# Patient Record
Sex: FEMALE | Race: WHITE | NOT HISPANIC OR LATINO | Employment: UNEMPLOYED | URBAN - NONMETROPOLITAN AREA
[De-identification: names, ages, dates, MRNs, and addresses within clinical notes are randomized per-mention and may not be internally consistent; named-entity substitution may affect disease eponyms.]

---

## 2017-02-22 PROCEDURE — 80048 BASIC METABOLIC PNL TOTAL CA: CPT | Performed by: NURSE PRACTITIONER

## 2017-04-06 ENCOUNTER — APPOINTMENT (OUTPATIENT)
Dept: PREADMISSION TESTING | Facility: HOSPITAL | Age: 27
End: 2017-04-06

## 2017-06-26 ENCOUNTER — HOSPITAL ENCOUNTER (EMERGENCY)
Age: 27
Discharge: HOME | End: 2017-06-27
Payer: MEDICAID

## 2017-06-26 DIAGNOSIS — M25.562 ACUTE PAIN OF LEFT KNEE: ICD-10-CM

## 2017-06-26 DIAGNOSIS — M79.672 LEFT FOOT PAIN: Primary | ICD-10-CM

## 2017-06-26 PROCEDURE — 99283 EMERGENCY DEPT VISIT LOW MDM: CPT

## 2017-06-26 RX ORDER — DEXTROAMPHETAMINE SACCHARATE, AMPHETAMINE ASPARTATE, DEXTROAMPHETAMINE SULFATE AND AMPHETAMINE SULFATE 5; 5; 5; 5 MG/1; MG/1; MG/1; MG/1
40 TABLET ORAL DAILY
COMMUNITY
End: 2018-12-19

## 2017-06-26 ASSESSMENT — PAIN DESCRIPTION - PAIN TYPE: TYPE: ACUTE PAIN

## 2017-06-26 ASSESSMENT — PAIN DESCRIPTION - DESCRIPTORS: DESCRIPTORS: STABBING;BURNING

## 2017-06-26 ASSESSMENT — PAIN DESCRIPTION - LOCATION: LOCATION: KNEE;LEG;ANKLE

## 2017-06-26 ASSESSMENT — PAIN DESCRIPTION - ORIENTATION: ORIENTATION: LEFT

## 2017-06-26 ASSESSMENT — PAIN DESCRIPTION - FREQUENCY: FREQUENCY: CONTINUOUS

## 2017-06-27 ENCOUNTER — APPOINTMENT (OUTPATIENT)
Dept: GENERAL RADIOLOGY | Age: 27
End: 2017-06-27
Payer: MEDICAID

## 2017-06-27 VITALS
WEIGHT: 110 LBS | OXYGEN SATURATION: 99 % | TEMPERATURE: 98.1 F | DIASTOLIC BLOOD PRESSURE: 90 MMHG | BODY MASS INDEX: 18.33 KG/M2 | SYSTOLIC BLOOD PRESSURE: 135 MMHG | RESPIRATION RATE: 18 BRPM | HEIGHT: 65 IN | HEART RATE: 100 BPM

## 2017-06-27 PROCEDURE — 73630 X-RAY EXAM OF FOOT: CPT

## 2017-06-27 PROCEDURE — 6360000002 HC RX W HCPCS: Performed by: NURSE PRACTITIONER

## 2017-06-27 PROCEDURE — 99283 EMERGENCY DEPT VISIT LOW MDM: CPT | Performed by: NURSE PRACTITIONER

## 2017-06-27 PROCEDURE — 73610 X-RAY EXAM OF ANKLE: CPT

## 2017-06-27 PROCEDURE — 96372 THER/PROPH/DIAG INJ SC/IM: CPT

## 2017-06-27 RX ORDER — OXYCODONE HYDROCHLORIDE AND ACETAMINOPHEN 5; 325 MG/1; MG/1
1 TABLET ORAL EVERY 4 HOURS PRN
Qty: 15 TABLET | Refills: 0 | Status: SHIPPED | OUTPATIENT
Start: 2017-06-27 | End: 2017-07-04

## 2017-06-27 RX ORDER — HYDROCODONE BITARTRATE AND ACETAMINOPHEN 5; 325 MG/1; MG/1
1 TABLET ORAL EVERY 6 HOURS PRN
Qty: 15 TABLET | Refills: 0 | Status: SHIPPED | OUTPATIENT
Start: 2017-06-27 | End: 2017-06-27

## 2017-06-27 RX ORDER — KETOROLAC TROMETHAMINE 30 MG/ML
30 INJECTION, SOLUTION INTRAMUSCULAR; INTRAVENOUS ONCE
Status: COMPLETED | OUTPATIENT
Start: 2017-06-27 | End: 2017-06-27

## 2017-06-27 RX ORDER — IBUPROFEN 200 MG
400 TABLET ORAL EVERY 8 HOURS PRN
Qty: 30 TABLET | Refills: 0 | Status: SHIPPED | OUTPATIENT
Start: 2017-06-27 | End: 2018-10-02 | Stop reason: ALTCHOICE

## 2017-06-27 RX ADMIN — KETOROLAC TROMETHAMINE 30 MG: 30 INJECTION, SOLUTION INTRAMUSCULAR at 00:07

## 2017-06-27 ASSESSMENT — PAIN SCALES - GENERAL
PAINLEVEL_OUTOF10: 7
PAINLEVEL_OUTOF10: 9

## 2017-08-24 ENCOUNTER — HOSPITAL ENCOUNTER (EMERGENCY)
Age: 27
Discharge: HOME | End: 2017-08-24
Payer: MEDICAID

## 2017-08-24 VITALS
SYSTOLIC BLOOD PRESSURE: 136 MMHG | TEMPERATURE: 97.8 F | DIASTOLIC BLOOD PRESSURE: 98 MMHG | HEART RATE: 113 BPM | RESPIRATION RATE: 18 BRPM | OXYGEN SATURATION: 99 %

## 2017-08-24 DIAGNOSIS — K02.9 DENTAL CARIES: Primary | ICD-10-CM

## 2017-08-24 PROCEDURE — 99282 EMERGENCY DEPT VISIT SF MDM: CPT | Performed by: NURSE PRACTITIONER

## 2017-08-24 PROCEDURE — 99282 EMERGENCY DEPT VISIT SF MDM: CPT

## 2017-08-24 RX ORDER — OXYCODONE HYDROCHLORIDE AND ACETAMINOPHEN 5; 325 MG/1; MG/1
1 TABLET ORAL EVERY 4 HOURS PRN
Qty: 15 TABLET | Refills: 0 | Status: SHIPPED | OUTPATIENT
Start: 2017-08-24 | End: 2017-08-31

## 2017-08-24 RX ORDER — AMOXICILLIN 500 MG/1
500 CAPSULE ORAL 3 TIMES DAILY
Qty: 30 CAPSULE | Refills: 0 | Status: SHIPPED | OUTPATIENT
Start: 2017-08-24 | End: 2017-09-03

## 2017-08-24 ASSESSMENT — PAIN SCALES - GENERAL: PAINLEVEL_OUTOF10: 6

## 2017-08-24 ASSESSMENT — ENCOUNTER SYMPTOMS: VOMITING: 0

## 2017-10-09 ENCOUNTER — OFFICE VISIT (OUTPATIENT)
Dept: URGENT CARE | Age: 27
End: 2017-10-09
Payer: MEDICAID

## 2017-10-09 VITALS
SYSTOLIC BLOOD PRESSURE: 120 MMHG | WEIGHT: 109 LBS | BODY MASS INDEX: 18.16 KG/M2 | TEMPERATURE: 98.6 F | HEIGHT: 65 IN | RESPIRATION RATE: 18 BRPM | DIASTOLIC BLOOD PRESSURE: 80 MMHG | HEART RATE: 100 BPM | OXYGEN SATURATION: 98 %

## 2017-10-09 DIAGNOSIS — M53.86 SCIATICA ASSOCIATED WITH DISORDER OF LUMBAR SPINE: Primary | ICD-10-CM

## 2017-10-09 PROCEDURE — 99213 OFFICE O/P EST LOW 20 MIN: CPT | Performed by: NURSE PRACTITIONER

## 2017-10-09 RX ORDER — MEDROXYPROGESTERONE ACETATE 150 MG/ML
150 INJECTION, SUSPENSION INTRAMUSCULAR
COMMUNITY
End: 2018-04-18

## 2017-10-09 RX ORDER — METAXALONE 800 MG/1
800 TABLET ORAL 3 TIMES DAILY
Qty: 8 TABLET | Refills: 0 | Status: SHIPPED | OUTPATIENT
Start: 2017-10-09 | End: 2017-10-19

## 2017-10-09 RX ORDER — NAPROXEN 500 MG/1
500 TABLET ORAL 2 TIMES DAILY WITH MEALS
Qty: 60 TABLET | Refills: 0 | Status: SHIPPED | OUTPATIENT
Start: 2017-10-09 | End: 2018-04-18

## 2017-10-09 ASSESSMENT — ENCOUNTER SYMPTOMS: BACK PAIN: 1

## 2017-10-09 NOTE — PROGRESS NOTES
[address] [facility]  [address] [facility] [place] [address]  Dept: [phone]  Loc: [phone]      Shavon Vegas is c/o of Leg Pain (Pt c/o of right lower back pain that radiates down right leg into right knee.)        HPI:     HPI  Patient presents with pain that is radiating from right lower back to right knee. She explains that she has sciatica but her pain has worsened and her entire thigh does not have the same sensation as her left leg. At times her knee will fold and cause her to be unable to walk momentarily. She did have an MRI completed several years ago. She did see a chiropractor in the past.   Past Medical History:   Diagnosis Date    ADD (attention deficit disorder)     ADHD (attention deficit hyperactivity disorder)     Chronic back pain     Factor 5 Leiden mutation, heterozygous ([facility] [address]     Hypertension       Past Surgical History:   Procedure Laterality Date    CHOLECYSTECTOMY      DILATION AND CURETTAGE OF UTERUS      TONSILLECTOMY AND ADENOIDECTOMY         No family history on file. Social History   Substance Use Topics    Smoking status: Current Every Day Smoker     Packs/day: 0.50     Types: Cigarettes    Smokeless tobacco: Never Used    Alcohol use No      Current Outpatient Prescriptions   Medication Sig Dispense Refill    medroxyPROGESTERone (DEPO-PROVERA) 150 MG/ML injection Inject 150 mg into the muscle every 3 months      metaxalone (SKELAXIN) 800 MG tablet Take 1 tablet by mouth 3 times daily for 10 days 8 tablet 0    naproxen (NAPROXEN) 500 MG EC tablet Take 1 tablet by mouth 2 times daily (with meals) 60 tablet 0    ibuprofen (ADVIL) 200 MG tablet Take 2 tablets by mouth every 8 hours as needed for Pain 30 tablet 0    amphetamine-dextroamphetamine (ADDERALL) 20 MG tablet Take 40 mg by mouth daily  Pt takes 20mg a day . No current facility-administered medications for this visit.       Allergies   Allergen Reactions    Demerol Hcl [Meperidine]      Pt states she becomes violent      Norco [Hydrocodone-Acetaminophen]     Codeine Nausea And Vomiting       Health Maintenance   Topic Date Due    HIV screen  05/01/2006    DTaP/Tdap/Td vaccine (1 - Tdap) 05/01/2010    Pneumococcal med risk (1 of 1 - PPSV23) 05/01/2010    Cervical cancer screen  05/01/2012    Flu vaccine (1) 09/01/2017       Subjective:      Review of Systems   Constitutional: Negative for chills and fever. Musculoskeletal: Positive for arthralgias and back pain. Objective:     Physical Exam   Constitutional: She is oriented to person, place, and time. She appears well-developed and well-nourished. No distress. HENT:   Head: Normocephalic and atraumatic. Right Ear: External ear normal.   Left Ear: External ear normal.   Eyes: Conjunctivae and EOM are normal. Pupils are equal, round, and reactive to light. Right eye exhibits no discharge. Left eye exhibits no discharge. No scleral icterus. Neck: Normal range of motion. Neck supple. No JVD present. No thyromegaly present. Cardiovascular: Normal rate, regular rhythm and normal heart sounds. No murmur heard. Pulmonary/Chest: Effort normal and breath sounds normal. No respiratory distress. Abdominal: Soft. Bowel sounds are normal. She exhibits no distension. There is no tenderness. Musculoskeletal:        Lumbar back: She exhibits decreased range of motion, tenderness and pain. Decreased sensation to right lower extremity   Neurological: She is alert and oriented to person, place, and time. No cranial nerve deficit. Skin: Skin is warm and dry. No rash noted. She is not diaphoretic. Psychiatric: She has a normal mood and affect. Her behavior is normal. Judgment normal.   Nursing note and vitals reviewed.     /80 (Site: Left Arm, Position: Sitting, Cuff Size: Small Adult)   Pulse 100   Temp 98.6 °F (37 °C) (Temporal)   Resp 18   Ht 5' 5\" (1.651 m)   Wt 109 lb (49.4

## 2017-10-09 NOTE — PATIENT INSTRUCTIONS
Up Now\" link. Current as of: March 21, 2017  Content Version: 11.3  © 2445-1406 [employer], [employer]. Care instructions adapted under license by [facility] [place] ([facility]). If you have questions about a medical condition or this instruction, always ask your healthcare professional. [address] any warranty or liability for your use of this information. 1. Keep appointment with PCP in Thursday, please discuss severity of back pain and numbness of leg. 2. Take naproxen with food, do not take ibuprofen, advil, aleve with this medication. 3. Ice to lower back for pain  4. If pain or numbness worsens go to the ED.

## 2017-10-16 ENCOUNTER — HOSPITAL ENCOUNTER (OUTPATIENT)
Dept: GENERAL RADIOLOGY | Facility: HOSPITAL | Age: 27
Discharge: HOME | End: 2017-10-16
Admitting: NURSE PRACTITIONER

## 2017-10-16 ENCOUNTER — TRANSCRIBE ORDERS (OUTPATIENT)
Dept: ADMINISTRATIVE | Facility: HOSPITAL | Age: 27
End: 2017-10-16

## 2017-10-16 DIAGNOSIS — M54.40 LOW BACK PAIN WITH SCIATICA, SCIATICA LATERALITY UNSPECIFIED, UNSPECIFIED BACK PAIN LATERALITY, UNSPECIFIED CHRONICITY: ICD-10-CM

## 2017-10-16 DIAGNOSIS — M54.40 LOW BACK PAIN WITH SCIATICA, SCIATICA LATERALITY UNSPECIFIED, UNSPECIFIED BACK PAIN LATERALITY, UNSPECIFIED CHRONICITY: Primary | ICD-10-CM

## 2017-10-16 PROCEDURE — 72110 X-RAY EXAM L-2 SPINE 4/>VWS: CPT

## 2017-10-18 ENCOUNTER — OFFICE VISIT (OUTPATIENT)
Dept: NEUROSURGERY | Facility: CLINIC | Age: 27
End: 2017-10-18

## 2017-10-18 VITALS
DIASTOLIC BLOOD PRESSURE: 72 MMHG | BODY MASS INDEX: 18.49 KG/M2 | WEIGHT: 111 LBS | SYSTOLIC BLOOD PRESSURE: 108 MMHG | HEIGHT: 65 IN

## 2017-10-18 DIAGNOSIS — F17.200 SMOKER: ICD-10-CM

## 2017-10-18 DIAGNOSIS — M54.41 CHRONIC BILATERAL LOW BACK PAIN WITH RIGHT-SIDED SCIATICA: Primary | ICD-10-CM

## 2017-10-18 DIAGNOSIS — G89.29 CHRONIC BILATERAL LOW BACK PAIN WITH RIGHT-SIDED SCIATICA: Primary | ICD-10-CM

## 2017-10-18 DIAGNOSIS — M54.16 LUMBAR RADICULOPATHY: ICD-10-CM

## 2017-10-18 PROCEDURE — 99214 OFFICE O/P EST MOD 30 MIN: CPT | Performed by: NURSE PRACTITIONER

## 2017-10-18 RX ORDER — METHOCARBAMOL 500 MG/1
TABLET, FILM COATED ORAL
Qty: 60 TABLET | Refills: 0 | Status: SHIPPED | OUTPATIENT
Start: 2017-10-18 | End: 2017-12-27

## 2017-10-18 NOTE — PROGRESS NOTES
"Neurosurgery Initial Patient Visit    Patient: Raquel COATES  : 1990    Primary Care Provider: Austyn Salazar MD    Requesting Provider: VON Henson      History    Chief Complaint:   Chief Complaint   Patient presents with   • Back Pain and R leg pain     Pt states that she has pain of the lower back.  She states that as a child about age 6, she fell off some playground equipement and sprained her back.  She states that she has having pain and numbness now in her R leg.  She states that she has fallen a few times since the leg pain has begun.  Pt had x-rays on 10/16/17.  She not had any PT and is not currently taking anything for pain or an anti-inflammatory meds.       History of Present Illness: She is a 27-year-old  female who presents with chief complaint of \"horrible lower back pain, knee pain, and other relations.\"  She is referred by VON Henson, and we have been asked to see the patient in consultation for further evaluation.    His history of having chronic low back pain for as long as she can remember.  She does not really relate any one specific injury.  She states that her back pain has been worsened by each of her 3 pregnancies and that she required chiropractic treatment with 2 of the 3.  She feels she has been getting worse in the last 5-6 months and particularly just over the last few weeks.  She is having increased low back pain that wraps and radiates into the right groin.  This continues both mesially and laterally down the right leg.  She feels a lot of numbness around her right knee and tingling all along her posterior thigh.  She often feels that her right knee or maybe the whole leg is going to give way with her.  She is not really relating any symptoms on the left.  She has not seen a chiropractor recently or had formal physical therapy.  There is a recent x-ray of the lumbar in Epic that shows no acute abnormality.  She has been told years ago that she has a " "slipped disc.  She cannot remember where her previous MRI was done and does not recall that she has ever been evaluated by Neurosurgery.    Allergies:   Codeine; Lortab [hydrocodone-acetaminophen]; Meperidine; Probalance [alimentum]; Prozac [fluoxetine hcl]; Risperdal [risperidone]; Seroquel xr [quetiapine fumarate er]; Wellbutrin [bupropion]; and Zoloft [sertraline hcl]    Past Medical History:    Past Medical History:   Diagnosis Date   • ADHD (attention deficit hyperactivity disorder)    • Allergic    • Anxiety    • Arthritis    • Depression    • Factor V deficiency    • Gallbladder abscess    • History of ear infections    • Hypertension    • Low back sprain     around age 6   • MRSA (methicillin resistant Staphylococcus aureus)    • Strep throat    • Urinary tract infection        Past Surgical History:   Past Surgical History:   Procedure Laterality Date   • ADENOIDECTOMY     • CHOLECYSTECTOMY     • DILATATION AND CURETTAGE     • TONSILLECTOMY     • WISDOM TOOTH EXTRACTION         Medications:    Current Outpatient Prescriptions on File Prior to Visit   Medication Sig Dispense Refill   • amphetamine-dextroamphetamine XR (ADDERALL XR) 25 MG 24 hr capsule Take 25 mg by mouth.     • Metoclopramide HCl 10 MG tablet dispersible Take 10 mg by mouth.       Current Facility-Administered Medications on File Prior to Visit   Medication Dose Route Frequency Provider Last Rate Last Dose   • acetaminophen (TYLENOL) tablet 1,000 mg  1,000 mg Oral Once VON Zavaleta            Social History:   reports that she has been smoking Cigarettes.  She has been smoking about 0.50 packs per day. She has never used smokeless tobacco. She reports that she does not drink alcohol or use illicit drugs.  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  She has 3 children ages 1, 3, and 6 years old.  She is single.  With regards to smoking cessation, she indicates \"I want to.\"  She has quit several times in the past.    Family " History:    Family History   Problem Relation Age of Onset   • Cancer Mother      melanoma;cervical   • Arthritis Mother        Review of Systems:  Review of Systems   Constitutional: Negative for chills, fever and unexpected weight change.        Overall, she feels she is in good health.   HENT: Negative for congestion, hearing loss, rhinorrhea, sore throat and tinnitus.    Eyes: Negative for photophobia and visual disturbance.        She has reading glasses.   Respiratory: Negative for cough, shortness of breath and wheezing.    Cardiovascular: Positive for palpitations. Negative for chest pain.        Tachycardia.   Gastrointestinal: Negative for constipation, diarrhea, nausea and vomiting.   Genitourinary: Negative for difficulty urinating.   Musculoskeletal: Positive for arthralgias, back pain and myalgias. Negative for gait problem and neck pain.   Skin: Negative for rash.   Allergic/Immunologic: Negative for environmental allergies.   Neurological: Positive for headaches. Negative for seizures and numbness.        She describes tension headaches along with sinus headaches.   Hematological: Does not bruise/bleed easily.        History of anemia that has required iron infusions.  He has been treated by Dr. Juarez for factor V deficiency, but is overdue for a follow-up.   Psychiatric/Behavioral: Negative for confusion. The patient is not nervous/anxious.    All other systems reviewed and are negative.          Neurological Physical Examination    Physical Exam   Constitutional: She is oriented to person, place, and time. She appears well-developed and well-nourished. No distress.   She is pleasant and cooperative, but is very talkative and hyper appearing.  She may be a bit anxious at times.  He appears in no acute distress.   HENT:   Head: Normocephalic and atraumatic.   Eyes: No scleral icterus.   Neck: Neck supple. No tracheal deviation and normal range of motion present.   Cardiovascular: Normal rate,  regular rhythm and normal heart sounds.    No murmur heard.  Pulmonary/Chest: Effort normal and breath sounds normal. No respiratory distress. She has no wheezes.   Musculoskeletal:   Date leg raising is positive on the right.  Deep tendon reflexes are a good 2+, a bit more hyperreflexic on the right compared to the left.  1-2+ at both ankles.  There is weakness of right dorsiflexion.   Neurological: She is alert and oriented to person, place, and time. She displays normal reflexes. No cranial nerve deficit.   Optic discs not visualized.   Skin: Skin is warm and dry. No rash noted.   Psychiatric: She has a normal mood and affect. Her speech is normal and behavior is normal. Cognition and memory are normal.   Vitals reviewed.         Medical Decision Making    Management Options:  The office we have discussed her care.  I have reviewed the lumbar x-ray that shows no acute findings.  We have talked about continued plan of care and she is very agreeable to a trial of physical therapy.  We will order this 3 days a week for 3-4 weeks.  We also need to proceed with a current lumbar MRI of the spine without contrast to determine if she presently has a surgical lesion.  She has had chronic pain which has been worsening over a 5-6 month period and has been particularly increased over the last few weeks.  Regards to medication currently she is not taking anything for pain, as her prescription from primary care has just been called in recently.  This would be Mobic.  She states a recent prescription for Skelaxin was denied by her insurance.  She is very sensitive to muscle relaxers and so we will try a small dose of Robaxin.  We we will see her back after physical therapy and to review MRI results.  I am hopeful that she will be able to respond to conservative care and treatment.  She is agreeable.    Raquel was seen today for back pain and r leg pain.    Diagnoses and all orders for this visit:    Chronic bilateral low back  pain with right-sided sciatica  -     Ambulatory Referral to Physical Therapy Evaluate and treat  -     MRI Lumbar Spine Without Contrast; Future    Lumbar radiculopathy  -     Ambulatory Referral to Physical Therapy Evaluate and treat  -     MRI Lumbar Spine Without Contrast; Future    Smoker    Other orders  -     methocarbamol (ROBAXIN) 500 MG tablet; 1/2 to 1 tablet twice daily PRN muscle spasms        Thank you, VON Henson, for this Consultation and the opportunity to participate in the care of this pleasant patient.

## 2017-10-24 ENCOUNTER — APPOINTMENT (OUTPATIENT)
Dept: PHYSICAL THERAPY | Facility: HOSPITAL | Age: 27
End: 2017-10-24

## 2017-10-27 ENCOUNTER — HOSPITAL ENCOUNTER (OUTPATIENT)
Dept: PHYSICAL THERAPY | Facility: HOSPITAL | Age: 27
Setting detail: THERAPIES SERIES
Discharge: HOME | End: 2017-10-27

## 2017-10-27 DIAGNOSIS — G89.29 CHRONIC BILATERAL LOW BACK PAIN WITH RIGHT-SIDED SCIATICA: Primary | ICD-10-CM

## 2017-10-27 DIAGNOSIS — M54.16 LUMBAR RADICULOPATHY: ICD-10-CM

## 2017-10-27 DIAGNOSIS — M54.41 CHRONIC BILATERAL LOW BACK PAIN WITH RIGHT-SIDED SCIATICA: Primary | ICD-10-CM

## 2017-10-27 PROCEDURE — 97162 PT EVAL MOD COMPLEX 30 MIN: CPT | Performed by: PHYSICAL THERAPIST

## 2017-10-27 NOTE — THERAPY EVALUATION
"    Outpatient Physical Therapy Ortho Initial Evaluation  [facility]     Patient Name: Raquel Best  : 1990  MRN: [id number]  Today's Date: 10/27/2017      Visit Date: 10/27/2017    Patient Active Problem List   Diagnosis   • Chronic bilateral low back pain with right-sided sciatica   • Lumbar radiculopathy   • Smoker        Past Medical History:   Diagnosis Date   • ADHD (attention deficit hyperactivity disorder)    • Allergic    • Anxiety    • Arthritis    • Depression    • Factor V deficiency    • Gallbladder abscess    • History of ear infections    • Hypertension    • Low back sprain     around age 6   • MRSA (methicillin resistant Staphylococcus aureus)    • Strep throat    • Urinary tract infection         Past Surgical History:   Procedure Laterality Date   • ADENOIDECTOMY     • CHOLECYSTECTOMY     • DILATATION AND CURETTAGE     • TONSILLECTOMY     • WISDOM TOOTH EXTRACTION         Visit Dx:     ICD-10-CM ICD-9-CM   1. Chronic bilateral low back pain with right-sided sciatica M54.41 724.2    G89.29 724.3     338.29   2. Lumbar radiculopathy M54.16 724.4             Patient History       10/27/17 1345          History    Chief Complaint Pain  -TB      Type of Pain Back pain;Lower Extremity / Leg   right  -TB      Date Current Problem(s) Began 17  -TB      Brief Description of Current Complaint She has a long h/o back and radicular pain since she was a child. She says she had a \"slipped disc\" when she was a child after some trauma. She was running in softball this summer and had a sudden stop when she felt increased back pain with increased right leg radicular pain. She was seeing a chiropractor but stopped because it wasn't helping.   -TB      Patient/Caregiver Goals Relieve pain;Return to prior level of function  -TB      Patient's Rating of General Health Very good  -TB      Hand Dominance right-handed  -TB      Patient seeing anyone else for problem(s)? No  -TB      How has patient tried to " help current problem? chiropractor--didn't help  -TB      What clinical tests have you had for this problem? X-ray  -TB      Results of Clinical Tests normal Xray; MRI scheduled next week  -TB      Pain     Pain Location Back;Leg   right  -TB      Pain at Present 4  -TB      Pain at Best 2  -TB      Pain at Worst 8  -TB      Pain Frequency Constant/continuous  -TB      What Performance Factors Make the Current Problem(s) WORSE? driving, crossing right leg over left; standing  -TB      What Performance Factors Make the Current Problem(s) BETTER? slumping in sitting; not wt bearing on left  -TB      Is your sleep disturbed? No  -TB      Fall Risk Assessment    Any falls in the past year: Yes  -TB      Number of falls reported in the last 12 months 4  -TB      Factors that contributed to the fall: --   knee krista  -TB      Does patient have a fear of falling No  -TB      Services    Do you plan to receive Home Health services in the near future No  -TB      Daily Activities    Primary Language English  -TB      Are you able to read Yes  -TB      Are you able to write Yes  -TB      How does patient learn best? Listening;Demonstration  -TB      Teaching needs identified Home Exercise Program;Management of Condition;Falls Prevention  -TB      Patient is concerned about/has problems with Performing home management (household chores, shopping, care of dependents);Difficulty with self care (i.e. bathing, dressing, toileting:;Walking  -TB      Does patient have problems with the following? Depression;Panic Attack;Anxiety  -TB      Barriers to learning None  -TB      Pt Participated in POC and Goals Yes  -TB      Safety    Are you being hurt, hit, or frightened by anyone at home or in your life? No  -TB      Are you being neglected by a caregiver No  -TB        User Key  (r) = Recorded By, (t) = Taken By, (c) = Cosigned By    Initials Name Provider Type    TB Eber Singh, PT Physical Therapist                PT Ortho        10/27/17 1400    Quarter Clearing    Quarter Clearing Lower Quarter Clearing  -TB    DTR- Lower Quarter Clearing    Patellar tendon (L2-4) Bilateral:;2- Normal response  -TB    Sensory Screen for Light Touch- Lower Quarter Clearing    L1 (inguinal area) Right:;Diminished;Left:;Intact  -TB    L2 (anterior mid thigh) Right:;Diminished;Left:;Intact  -TB    L3 (distal anterior thigh) Right:;Diminished;Left:;Intact  -TB    L4 (medial lower leg/foot) Right:;Diminished;Left:;Intact  -TB    L5 (lateral lower leg/great toe) Right:;Diminished;Left:;Intact  -TB    S1 (bottom of foot) Right:;Diminished;Left:;Intact  -TB    Myotomal Screen- Lower Quarter Clearing    Hip flexion (L2) Right:;4- (Good -);Left:;WNL  -TB    Knee extension (L3) Right:;4- (Good -);Left:;WNL  -TB    Ankle DF (L4) Right:;4- (Good -);Left:;WNL  -TB    Great toe extension (L5) Right:;4- (Good -);Left:;4 (Good);WNL  -TB    Ankle PF (S1) Right:;4- (Good -);Left:;WNL  -TB    Knee flexion (S2) Right:;4- (Good -);Left:;WNL  -TB    Lumbar ROM Screen- Lower Quarter Clearing    Lumbar Flexion Impaired   25% normal forward bend with increased pain TL area  -TB    Lumbar Extension Normal   tends to hinge LS level into extension  -TB    Special Tests/Palpation    Special Tests/Palpation Lumbar/SI  -TB    Thoracic Accessory Motions    Thoracic Accessory Motions Tested? Yes  -TB    Pa glide- Middle thoracic Hypomobile  -TB    Pa glide- Lower thoracic Hypomobile   PA lower thor increased LS pain  -TB    Lumbosacral Palpation    SI Right:;Tender  -TB    Lumbosacral Segment Bilateral:;Tender  -TB    Piriformis Right:;Tender;Guarded/taut;Trigger point  -TB    Iliopsoas Right:;Tender;Guarded/taut  -TB    Lumbosacral Palpation? Yes  -TB    Lumbosacral Accessory Motions    Lumbosacral Accessory Motions Tested? Yes  -TB    PA glide- Sacral base Hypomobile  -TB    PA glide- Sacral apex Hypomobile  -TB    LS flexion Hypomobile  -TB    Lumbar/SI Special Tests    Standing  Flexion Test (SI Dysfunction) Right:;Positive  -TB    Stork Test (SI Dysfunction) Right:;Positive  -TB    Trendelenburg Test (Gluteus Medius Weakness) Right:;Positive  -TB    SLR (Neural Tension) Bilateral:;Negative  -TB    SI Compression Test (SI Dysfunction) Right:;Positive   tried fitting SI belt but it made pain worse so took it off  -TB    SI Distraction Test (SI Dysfunction) Right:;Negative  -TB    Thigh Thrust/Posterior Shear (SI Dysfunction) Right:;Positive  -TB    ANNABELLE (hip vs. SI Dysfunction) Right:;Positive  -TB    Sacral Spring Test (SI Dysfunction) Right:;Positive  -TB    ROM (Range of Motion)    General ROM Detail rght hip joint limited 25% in all planes  -TB    Pathomechanics    Spine Pathomechanics Bends knees with attempted lumbar extension;Hinges into extension at one segment in lumbar;Limited lumbar flattening with forward bend;Excessive thoracic kyphosis with forward bend  -TB      User Key  (r) = Recorded By, (t) = Taken By, (c) = Cosigned By    Initials Name Provider Type    TB Eber Singh, PT Physical Therapist                            Therapy Education       10/27/17 1345          Therapy Education    Education Details GIDEON with pillow under hips  -TB      Given HEP;Symptoms/condition management;Posture/body mechanics;Pain management  -TB      Program New  -TB      How Provided Verbal;Demonstration  -TB      Provided to Patient  -TB      Level of Understanding Teach back education performed;Verbalized;Demonstrated  -TB        User Key  (r) = Recorded By, (t) = Taken By, (c) = Cosigned By    Initials Name Provider Type    TB Ebre Singh, PT Physical Therapist                PT OP Goals       10/27/17 1345       PT Short Term Goals    STG Date to Achieve 11/17/17  -TB     STG 1 Intermittent right leg symptoms 50% of the day  -TB     STG 1 Progress New  -TB     STG 2 Able to tolerate prone pressup 50% without increased back pain  -TB     STG 2 Progress New  -TB     Long Term Goals     LTG Date to Achieve 12/08/17  -TB     LTG 1 No right leg radicular symptoms for a week except occasional minor symptoms relieved with exercises  -TB     LTG 1 Progress New  -TB     LTG 2 Fully standing forward bend without flare of back or leg pain  -TB     LTG 2 Progress New  -TB     LTG 3 Full prone pressup without flare of back pain  -TB     LTG 3 Progress New  -TB     LTG 4 Gross MMT right LE 4+/5  -TB     LTG 4 Progress New  -TB     LTG 5 Able to walk without limping  -TB     LTG 5 Progress New  -TB     LTG 6 Ind with HEP for core/leg stability  -TB     LTG 6 Progress New  -TB     Time Calculation    PT Goal Re-Cert Due Date 11/26/17  -TB       User Key  (r) = Recorded By, (t) = Taken By, (c) = Cosigned By    Initials Name Provider Type    TB Eber Singh, PT Physical Therapist                PT Assessment/Plan       10/27/17 1345       PT Assessment    Functional Limitations Impaired gait;Limitation in home management;Performance in self-care ADL;Performance in leisure activities;Limitations in functional capacity and performance  -TB     Impairments Pain;Posture;Range of motion;Sensation;Muscle strength;Joint mobility;Gait  -TB     Assessment Comments She appears to have a myriad of problems related to her back with right leg pain. She tends to rest in a hyperkyphotic thoracic posture with this kyphosis extending down to her lower lumbar. She had a negative SLR and her sacrum is quite stiff with tenderness over her right SI joint with guarding in her right piriformis. Her numbness and weakness doesn't really follow any specific dermatome or myotome as she says her whole leg feels weak and has diminished sensation. I'm not sure how reliable her MMT of her right LE was as it wasn't consistent with her functional movements. I wonder if she may have an SI issue with some component of a deep gluteal (piriformis) syndrome vs some radiating pain from her hip or piriformis and iliopsoas. There are years of  stiffness and compensatory movemments that we will need to work through. She tends to focus on her pain and lack of ability so we will also have the challenge of moving her away from this as well.    -TB     Please refer to paper survey for additional self-reported information Yes  -TB     Rehab Potential Good  -TB     Patient/caregiver participated in establishment of treatment plan and goals Yes  -TB     Patient would benefit from skilled therapy intervention Yes  -TB     PT Plan    PT Frequency 2x/week;3x/week  -TB     Predicted Duration of Therapy Intervention (days/wks) 6 weeks  -TB     Planned CPT's? PT EVAL MOD COMPLELITY: 81862;PT THER PROC EA 15 MIN: 04861;PT MANUAL THERAPY EA 15 MIN: 98551;PT NEUROMUSC RE-EDUCATION EA 15 MIN: 54028;PT GAIT TRAINING EA 15 MIN: 11729;PT ELECTRICAL STIM UNATTEND: ;PT ELECTRICAL STIM ATTD EA 15 MIN: 78256;PT ULTRASOUND EA 15 MIN: 33572  -TB     PT Plan Comments We will start with working on gentle mobility through her thoracic and sacrum and right hip joint. We will also work on muscle relaxation through right piriformis and IP. As her mobility improves, we will progress to core/hip stability and right leg strengthening. We will progress to functional strengthening for household ADL's.  -TB       User Key  (r) = Recorded By, (t) = Taken By, (c) = Cosigned By    Initials Name Provider Type    TB Eber Singh, PT Physical Therapist                                    Time Calculation:   Start Time: 1345  Stop Time: 1445  Time Calculation (min): 60 min     Therapy Charges for Today     Code Description Service Date Service Provider Modifiers Qty    96363025027  PT EVAL MOD COMPLEXITY 4 10/27/2017 Eber Singh, PT GP 1                    Eber Singh, PT  10/27/2017

## 2017-11-01 ENCOUNTER — HOSPITAL ENCOUNTER (OUTPATIENT)
Dept: MRI IMAGING | Facility: HOSPITAL | Age: 27
Discharge: HOME | End: 2017-11-01
Admitting: NURSE PRACTITIONER

## 2017-11-01 DIAGNOSIS — G89.29 CHRONIC BILATERAL LOW BACK PAIN WITH RIGHT-SIDED SCIATICA: ICD-10-CM

## 2017-11-01 DIAGNOSIS — M54.41 CHRONIC BILATERAL LOW BACK PAIN WITH RIGHT-SIDED SCIATICA: ICD-10-CM

## 2017-11-01 DIAGNOSIS — M54.16 LUMBAR RADICULOPATHY: ICD-10-CM

## 2017-11-01 PROCEDURE — 72148 MRI LUMBAR SPINE W/O DYE: CPT

## 2017-11-03 ENCOUNTER — HOSPITAL ENCOUNTER (OUTPATIENT)
Dept: PHYSICAL THERAPY | Facility: HOSPITAL | Age: 27
Setting detail: THERAPIES SERIES
Discharge: HOME | End: 2017-11-03

## 2017-11-03 DIAGNOSIS — G89.29 CHRONIC BILATERAL LOW BACK PAIN WITH RIGHT-SIDED SCIATICA: Primary | ICD-10-CM

## 2017-11-03 DIAGNOSIS — M54.41 CHRONIC BILATERAL LOW BACK PAIN WITH RIGHT-SIDED SCIATICA: Primary | ICD-10-CM

## 2017-11-03 DIAGNOSIS — M54.16 LUMBAR RADICULOPATHY: ICD-10-CM

## 2017-11-03 PROCEDURE — 97110 THERAPEUTIC EXERCISES: CPT

## 2017-11-03 PROCEDURE — 97140 MANUAL THERAPY 1/> REGIONS: CPT

## 2017-11-03 NOTE — THERAPY TREATMENT NOTE
Outpatient Physical Therapy Ortho Treatment Note  [facility]     Patient Name: Raquel Best  : 1990  MRN: [id number]  Today's Date: 11/3/2017      Visit Date: 2017    Visit Dx:    ICD-10-CM ICD-9-CM   1. Chronic bilateral low back pain with right-sided sciatica M54.41 724.2    G89.29 724.3     338.29   2. Lumbar radiculopathy M54.16 724.4       Patient Active Problem List   Diagnosis   • Chronic bilateral low back pain with right-sided sciatica   • Lumbar radiculopathy   • Smoker        Past Medical History:   Diagnosis Date   • ADHD (attention deficit hyperactivity disorder)    • Allergic    • Anxiety    • Arthritis    • Depression    • Factor V deficiency    • Gallbladder abscess    • History of ear infections    • Hypertension    • Low back sprain     around age 6   • MRSA (methicillin resistant Staphylococcus aureus)    • Strep throat    • Urinary tract infection         Past Surgical History:   Procedure Laterality Date   • ADENOIDECTOMY     • CHOLECYSTECTOMY     • DILATATION AND CURETTAGE     • TONSILLECTOMY     • WISDOM TOOTH EXTRACTION                               PT Assessment/Plan       17 1345       PT Assessment    Assessment Comments Patient is painful today, able to decrease her pain slightly, but she had muscle soreness after treatment.  She is very weak in her core and her hips.  She has not met any goals at this time.  Will see how she tolerated today's treatment and progress with treatment.  -AL     PT Plan    PT Plan Comments Continue to work on core and hip strengthening  -AL       User Key  (r) = Recorded By, (t) = Taken By, (c) = Cosigned By    Initials Name Provider Type    NICO Estes PTA Physical Therapy Assistant                    Exercises       17 1345 17 1300       Subjective Comments    Subjective Comments She has pain today, she believes this is partly due to the weather, as well as her chronic pain.  -AL --  -AL     Subjective Pain    Able  to rate subjective pain? yes  -AL --  -AL     Pre-Treatment Pain Level 7  -AL --  -AL     Post-Treatment Pain Level 6  -AL      Subjective Pain Comment Pain in the low back and into the R hip.  Has pain in the right foot.  This is sharp.  -AL --  -AL     Exercise 1    Exercise Name 1 GIDEON with two pillows  -AL --  -AL     Additional Comments Started with one  -AL --  -AL     Exercise 2    Exercise Name 2 Supine hooklying TA contractions  -AL --  -AL     Cueing 2 Verbal  -AL --  -AL     Sets 2 1  -AL --  -AL     Reps 2 10  -AL --  -AL     Additional Comments added to HEP  -AL --  -AL     Exercise 3    Exercise Name 3 Supine hooklying hip adduction with ball between knees  -AL --  -AL     Cueing 3 Verbal  -AL --  -AL     Sets 3 1  -AL --  -AL     Reps 3 10  -AL --  -AL     Additional Comments States has weakness in the R hip when doing this.  -AL --  -AL     Exercise 4    Exercise Name 4 B BKFO with TA contraction  -AL --  -AL     Cueing 4 Verbal;Tactile  -AL --  -AL     Sets 4 1  -AL --  -AL     Reps 4 15  -AL --  -AL     Exercise 5    Exercise Name 5 Unweight arms with pillows when in sitting.  -AL      Additional Comments Added to HEP  -AL        User Key  (r) = Recorded By, (t) = Taken By, (c) = Cosigned By    Initials Name Provider Type    NICO Estes PTA Physical Therapy Assistant                        Manual Rx (last 36 hours)      Manual Treatments       11/03/17 1345          Manual Rx 1    Manual Rx 1 Location STM to the lumbar spine prone  -AL      Manual Rx 1 Grade 1-2  -AL      Manual Rx 1 Duration 15  -AL        User Key  (r) = Recorded By, (t) = Taken By, (c) = Cosigned By    Initials Name Provider Type    NICO Estes PTA Physical Therapy Assistant                PT OP Goals       11/03/17 1345       PT Short Term Goals    STG Date to Achieve 11/17/17  -AL     STG 1 Intermittent right leg symptoms 50% of the day  -AL     STG 1 Progress Ongoing  -AL     STG 1 Progress Comments Constant  -AL      STG 2 Able to tolerate prone pressup 50% without increased back pain  -AL     STG 2 Progress Ongoing  -AL     STG 2 Progress Comments Not attempted today  -AL     Long Term Goals    LTG Date to Achieve 12/08/17  -AL     LTG 1 No right leg radicular symptoms for a week except occasional minor symptoms relieved with exercises  -AL     LTG 1 Progress New  -AL     LTG 2 Fully standing forward bend without flare of back or leg pain  -AL     LTG 2 Progress New  -AL     LTG 3 Full prone pressup without flare of back pain  -AL     LTG 3 Progress Ongoing  -AL     LTG 3 Progress Comments Needs two pillows under abdomen  -AL     LTG 4 Gross MMT right LE 4+/5  -AL     LTG 4 Progress New  -AL     LTG 5 Able to walk without limping  -AL     LTG 5 Progress Ongoing  -AL     LTG 5 Progress Comments Still limping today  -AL     LTG 6 Ind with HEP for core/leg stability  -AL     LTG 6 Progress Ongoing  -AL     LTG 6 Progress Comments Started on HEP  -AL     Time Calculation    PT Goal Re-Cert Due Date 11/26/17  -AL       User Key  (r) = Recorded By, (t) = Taken By, (c) = Cosigned By    Initials Name Provider Type    NICO Estes PTA Physical Therapy Assistant                Therapy Education       11/03/17 1345          Therapy Education    Education Details TA contraction, pillows under arms when sitting  -AL      Given HEP  -AL      Program New  -AL      How Provided Verbal;Demonstration  -AL      Provided to Patient  -AL      Level of Understanding Verbalized;Demonstrated  -AL        User Key  (r) = Recorded By, (t) = Taken By, (c) = Cosigned By    Initials Name Provider Type    NICO Estes PTA Physical Therapy Assistant                Time Calculation:   Start Time: 1345  Stop Time: 1430  Time Calculation (min): 45 min  Total Timed Code Minutes- PT: 45 minute(s)    Therapy Charges for Today     Code Description Service Date Service Provider Modifiers Qty    99035369265 HC PT MANUAL THERAPY EA 15 MIN 11/3/2017 Mignon  SAMMY Estes PTA GP 1    [id number]  PT THER PROC EA 15 MIN 11/3/2017 Mignon Estes PTA GP 2                    Mignon Estes PTA  11/3/2017

## 2017-11-07 ENCOUNTER — APPOINTMENT (OUTPATIENT)
Dept: PHYSICAL THERAPY | Facility: HOSPITAL | Age: 27
End: 2017-11-07

## 2017-11-09 ENCOUNTER — HOSPITAL ENCOUNTER (OUTPATIENT)
Dept: PHYSICAL THERAPY | Facility: HOSPITAL | Age: 27
Setting detail: THERAPIES SERIES
Discharge: HOME | End: 2017-11-09

## 2017-11-09 PROCEDURE — 97110 THERAPEUTIC EXERCISES: CPT

## 2017-11-09 PROCEDURE — 97140 MANUAL THERAPY 1/> REGIONS: CPT

## 2017-11-09 NOTE — THERAPY TREATMENT NOTE
Outpatient Physical Therapy Ortho Treatment Note  [facility]     Patient Name: Raquel Best  : 1990  MRN: [id number]  Today's Date: 2017      Visit Date: 2017    Visit Dx:  No diagnosis found.    Patient Active Problem List   Diagnosis   • Chronic bilateral low back pain with right-sided sciatica   • Lumbar radiculopathy   • Smoker        Past Medical History:   Diagnosis Date   • ADHD (attention deficit hyperactivity disorder)    • Allergic    • Anxiety    • Arthritis    • Depression    • Factor V deficiency    • Gallbladder abscess    • History of ear infections    • Hypertension    • Low back sprain     around age 6   • MRSA (methicillin resistant Staphylococcus aureus)    • Strep throat    • Urinary tract infection         Past Surgical History:   Procedure Laterality Date   • ADENOIDECTOMY     • CHOLECYSTECTOMY     • DILATATION AND CURETTAGE     • TONSILLECTOMY     • WISDOM TOOTH EXTRACTION                               PT Assessment/Plan       17 1056       PT Assessment    Assessment Comments She has a bit of an unusual presentation and she is limited by position tolerance which some symptom changes are not atypical for her diagnosis. With GIDEON today she c/o increased pain in B arm and shoulders, but once placed in a more passive extension position on the wedge she c/o increased pressure in her low back. With a posterior sacral mobilization with her hips shifted to the L she reported increased radicular symptoms in her R buttock which would be a typical response, but she c/o increased burning in the sole of her foot without radicular symptoms running down the R LE. 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 With therex activity she presents with mild anxiety and as she fatigues she not only has difficulty with  neuromuscular control but perceives the fatigue as pain.  -EC     PT Plan    PT Plan Comments Continue to work on core and hip strengthening, progress as symptoms allow but the patient will benefit from a conservative progression.  -EC       User Key  (r) = Recorded By, (t) = Taken By, (c) = Cosigned By    Initials Name Provider Type    MANUEL Haque PTA Physical Therapy Assistant                    Exercises       11/09/17 1000          Subjective Comments    Subjective Comments She said her pain is in her entire back down to her right leg  -EC      Subjective Pain    Able to rate subjective pain? yes  -EC      Pre-Treatment Pain Level 5  -EC      Post-Treatment Pain Level 3  -EC      Exercise 1    Exercise Name 1 GIDEON with one pillows  -EC      Additional Comments started with 2 pillows, also attempted wedge with 1 pillow, also attempted sacral posterior mobilization in GIDEON. She c/o increased back pain and sacral pressure , varrying degrees of change in her radicular symptoms  -EC      Exercise 2    Exercise Name 2 hoooklying with PPT and TA contractions  -EC      Cueing 2 Tactile;Verbal  -EC      Sets 2 1  -EC      Reps 2 10  -EC      Exercise 3    Exercise Name 3 hooklying B BKFO  -EC      Cueing 3 Verbal;Tactile  -EC      Sets 3 1  -EC      Reps 3 10  -EC        User Key  (r) = Recorded By, (t) = Taken By, (c) = Cosigned By    Initials Name Provider Type     Milad Haque PTA Physical Therapy Assistant                        Manual Rx (last 36 hours)      Manual Treatments       11/09/17 0900          Manual Rx 1    Manual Rx 1 Location STM to the lumbar spine prone  -EC      Manual Rx 1 Grade min-mod  -EC      Manual Rx 1 Duration 10  -EC      Manual Rx 2    Manual Rx 2 Location Sacral   -EC      Manual Rx 2 Type P/A mobilizations , PA with R vs. L bias  with and without manual lumbar traction and hips shifted to the L  -EC      Manual Rx 2 Grade 2   intermittent oscillations  -EC      Manual Rx 2  Duration 10  -EC        User Key  (r) = Recorded By, (t) = Taken By, (c) = Cosigned By    Initials Name Provider Type    MANUEL Haque PTA Physical Therapy Assistant                PT OP Goals       11/09/17 1000       PT Short Term Goals    STG Date to Achieve 11/17/17  -EC     STG 1 Intermittent right leg symptoms 50% of the day  -EC     STG 1 Progress Ongoing  -EC     STG 2 Able to tolerate prone pressup 50% without increased back pain  -EC     STG 2 Progress Ongoing  -EC     Long Term Goals    LTG Date to Achieve 12/08/17  -EC     LTG 1 No right leg radicular symptoms for a week except occasional minor symptoms relieved with exercises  -EC     LTG 1 Progress Ongoing  -EC     LTG 2 Fully standing forward bend without flare of back or leg pain  -EC     LTG 2 Progress Ongoing  -EC     LTG 3 Full prone pressup without flare of back pain  -EC     LTG 3 Progress Ongoing  -EC     LTG 3 Progress Comments c/o increased LBP with GIDEON today  -EC     LTG 4 Gross MMT right LE 4+/5  -EC     LTG 4 Progress Ongoing  -EC     LTG 5 Able to walk without limping  -EC     LTG 5 Progress Ongoing  -EC     LTG 6 Ind with HEP for core/leg stability  -EC     LTG 6 Progress Ongoing  -EC       User Key  (r) = Recorded By, (t) = Taken By, (c) = Cosigned By    Initials Name Provider Type    MANUEL Haque PTA Physical Therapy Assistant                Therapy Education       11/09/17 1329          Therapy Education    Given Symptoms/condition management  -EC      How Provided Verbal  -EC      Level of Understanding Verbalized;Demonstrated  -EC        User Key  (r) = Recorded By, (t) = Taken By, (c) = Cosigned By    Initials Name Provider Type    MANUEL Haque PTA Physical Therapy Assistant                Time Calculation:   Start Time: 1012  Stop Time: 1100  Time Calculation (min): 48 min  Total Timed Code Minutes- PT: 48 minute(s)    Therapy Charges for Today     Code Description Service Date Service Provider Modifiers Qty     54012733504 HC PT MANUAL THERAPY EA 15 MIN 11/9/2017 Milad Haque, PATY GP 1    07832297479 HC PT THER PROC EA 15 MIN 11/9/2017 Milad Haque PTA GP 2                    Milad Haque, PATY  11/9/2017

## 2017-11-15 ENCOUNTER — OFFICE VISIT (OUTPATIENT)
Dept: NEUROSURGERY | Facility: CLINIC | Age: 27
End: 2017-11-15

## 2017-11-15 VITALS
HEIGHT: 65 IN | WEIGHT: 111 LBS | BODY MASS INDEX: 18.49 KG/M2 | DIASTOLIC BLOOD PRESSURE: 70 MMHG | SYSTOLIC BLOOD PRESSURE: 105 MMHG

## 2017-11-15 DIAGNOSIS — M54.41 CHRONIC BILATERAL LOW BACK PAIN WITH RIGHT-SIDED SCIATICA: Primary | ICD-10-CM

## 2017-11-15 DIAGNOSIS — M51.37 DEGENERATION OF LUMBAR OR LUMBOSACRAL INTERVERTEBRAL DISC: ICD-10-CM

## 2017-11-15 DIAGNOSIS — IMO0001 NORMAL BODY MASS INDEX (BMI): ICD-10-CM

## 2017-11-15 DIAGNOSIS — M54.16 LUMBAR RADICULOPATHY: ICD-10-CM

## 2017-11-15 DIAGNOSIS — F17.200 SMOKER: ICD-10-CM

## 2017-11-15 DIAGNOSIS — M25.551 RIGHT HIP PAIN: ICD-10-CM

## 2017-11-15 DIAGNOSIS — G89.29 CHRONIC BILATERAL LOW BACK PAIN WITH RIGHT-SIDED SCIATICA: Primary | ICD-10-CM

## 2017-11-15 PROBLEM — M51.379 DEGENERATION OF LUMBAR OR LUMBOSACRAL INTERVERTEBRAL DISC: Status: ACTIVE | Noted: 2017-11-15

## 2017-11-15 PROCEDURE — 99213 OFFICE O/P EST LOW 20 MIN: CPT | Performed by: NURSE PRACTITIONER

## 2017-11-15 NOTE — PROGRESS NOTES
"Neurosurgery Follow Up Office Visit      Patient Name:  Raquel Best  Age:  27 y.o.  YOB: 1990  MR#:  [id number]    /70 (BP Location: Right arm, Patient Position: Sitting)  Ht 65\" (165.1 cm)  Wt 111 lb (50.3 kg)  BMI 18.47 kg/m2    Social History   Substance Use Topics   • Smoking status: Current Every Day Smoker     Packs/day: 0.50     Types: Cigarettes   • Smokeless tobacco: Never Used   • Alcohol use No       Chief Complaint   Patient presents with   • Back Pain     Raquel returns after some physical therapy for low back pain, she states this has helped build up her strength and she is also here for her MRI results.          History  Chief Complaint:  She returns today for follow-up from physical therapy as well as lumbar MRI results.  She continues having low back pain and pain focused generally around the right hip.  She continues to describe pain radiating medially and laterally down her right leg all the way to the foot.  She has had 2-3 sessions of physical therapy and feels that she may be getting a little bit stronger.  She states her therapist has expressed concern about her developing a helped to back and also wonders if there is a problem within the right hip joint.  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  When she was born she has been told that her right foot was hyperextended so that her toes or touching her shin.  She was treated with casts and bracing for a number of years and was not given any hope to walk at all.  She states her insurance did not cover Mobic or Robaxin.  She has been using over-the-counter pain relievers and feels that is adequate.      Physical Examination:  Upon exam, she is very busy in the room today, having to her small children with her.  She is constantly moving about.  She is awake and " alert, pleasant and cooperative, speech is clear and appropriate.  Skin is warm and dry.  She has some point tenderness along the right sacroiliac joints, iliac crest and right lateral trochanter.  Straight leg raising seems unaffected bilaterally.  Deep tendon reflexes 2+ patellar and 1-2+ at both ankles.  His continued weakness of right dorsiflexion.      Medical Decision Making  Treatment Options:   In the office we have discussed her care at length.  I have reviewed her lumbar MRI and discussed the results with her.  The primary finding is mild disc degeneration and bulging at L5-S1, however there does not appear to be a resulting compression or impingement.  I do not see a clear surgical lesion.  Given the information that she is shared today I think it very reasonable to get an x-ray films of the pelvis and right hip, and we will also proceed with EMG and nerve conduction testing of the right lower extremity.  Her therapist has probably also mentioned problems with the piriformis muscle.  We will better evaluate for what may be related to a congenital injury or defect.  States they are also going to be measuring her for inserts or some type of bracing to help stabilize the right foot.  She will continue with physical therapy and we will go ahead and refer to pain management, as she may be a good candidate at some point to consider injections.  We will see her back to review additional testing, and she is agreeable.      Assessment and Plan  Raquel was seen today for back pain.    Diagnoses and all orders for this visit:    Chronic bilateral low back pain with right-sided sciatica    Lumbar radiculopathy    Right hip pain    Degeneration of lumbar or lumbosacral intervertebral disc    Smoker    Normal body mass index (BMI)        Return for follow up for test results/review.      VON Malin

## 2017-11-16 ENCOUNTER — HOSPITAL ENCOUNTER (OUTPATIENT)
Dept: PHYSICAL THERAPY | Facility: HOSPITAL | Age: 27
Setting detail: THERAPIES SERIES
Discharge: HOME | End: 2017-11-16

## 2017-11-16 DIAGNOSIS — M54.41 CHRONIC BILATERAL LOW BACK PAIN WITH RIGHT-SIDED SCIATICA: Primary | ICD-10-CM

## 2017-11-16 DIAGNOSIS — G89.29 CHRONIC BILATERAL LOW BACK PAIN WITH RIGHT-SIDED SCIATICA: Primary | ICD-10-CM

## 2017-11-16 PROCEDURE — 97140 MANUAL THERAPY 1/> REGIONS: CPT

## 2017-11-16 PROCEDURE — 97110 THERAPEUTIC EXERCISES: CPT

## 2017-11-16 NOTE — THERAPY TREATMENT NOTE
"    Outpatient Physical Therapy Ortho Treatment Note  [facility]     Patient Name: Raquel Best  : 1990  MRN: [id number]  Today's Date: 2017      Visit Date: 2017    Visit Dx:    ICD-10-CM ICD-9-CM   1. Chronic bilateral low back pain with right-sided sciatica M54.41 724.2    G89.29 724.3     338.29       Patient Active Problem List   Diagnosis   • Chronic bilateral low back pain with right-sided sciatica   • Lumbar radiculopathy   • Smoker   • Degeneration of lumbar or lumbosacral intervertebral disc   • Normal body mass index (BMI)   • Right hip pain        Past Medical History:   Diagnosis Date   • ADHD (attention deficit hyperactivity disorder)    • Allergic    • Anxiety    • Arthritis    • Depression    • Factor V deficiency    • Gallbladder abscess    • History of ear infections    • Hypertension    • Low back sprain     around age 6   • MRSA (methicillin resistant Staphylococcus aureus)    • Strep throat    • Urinary tract infection         Past Surgical History:   Procedure Laterality Date   • ADENOIDECTOMY     • CHOLECYSTECTOMY     • DILATATION AND CURETTAGE     • TONSILLECTOMY     • WISDOM TOOTH EXTRACTION                               PT Assessment/Plan       17 1100       PT Assessment    Assessment Comments (P)  Patient continues to present with a weak core and weak B hips. We assessed pelvic orientation and found that she is rotated to the left and elevated on the right. We were able to correct this with intervention and patient stated \"Yeah, I guess I can feel a difference.\" Patient complained of increased pain with ther ex; however, she was a bit ambiguous when describing her pain. She may be unable to differentiate the type of pain she was feeling, whether it be muscle soreness, burning, or tingling and we will continue to monitor and educate her on this. She complained of pain in her right groin and reported it was not the same pain she normally has, then stated the pain " was in her knee and ankle.  -LC     PT Plan    PT Plan Comments (P)  Continue core and hip strengthening in order to decrease strain on her lumbar spine as well as posture.  -LC       User Key  (r) = Recorded By, (t) = Taken By, (c) = Cosigned By    Initials Name Provider Type    BETITO Bashir, PTA Student PTA Student                    Exercises       11/16/17 1000          Subjective Comments    Subjective Comments (P)  Patient reports said she has been trying to correct her posture at home.  -LC      Subjective Pain    Able to rate subjective pain? (P)  yes  -LC      Pre-Treatment Pain Level (P)  3  -LC      Post-Treatment Pain Level (P)  3  -LC      Subjective Pain Comment (P)  she states increased muscle soreness and has pain in her knees and ankles 5/10  -LC      Exercise 1    Exercise Name 1 (P)  Assessed pelvic rotation see assessment  -LC      Exercise 2    Exercise Name 2 (P)  hooklying with R hip flexion/extension with physioball  -LC      Cueing 2 (P)  Verbal;Tactile  -LC      Sets 2 (P)  1  -LC      Reps 2 (P)  10  -LC      Exercise 3    Exercise Name 3 (P)  hooklying B hip adduction with small green ball  -LC      Cueing 3 (P)  Verbal;Tactile  -LC      Sets 3 (P)  1  -LC      Reps 3 (P)  10  -LC      Exercise 4    Exercise Name 4 (P)  B LE muscle synnergy   -LC      Cueing 4 (P)  Verbal;Tactile  -LC      Sets 4 (P)  1  -LC      Reps 4 (P)  5  -LC      Exercise 5    Exercise Name 5 (P)  B unilateral BKFO  -LC      Cueing 5 (P)  Verbal  -LC      Sets 5 (P)  2  -LC      Reps 5 (P)  10  -LC      Exercise 6    Exercise Name 6 (P)  Bridges with TA contractions  -LC      Cueing 6 (P)  Verbal  -LC      Sets 6 (P)  2  -LC      Reps 6 (P)  5  -LC      Exercise 7    Exercise Name 7 (P)  Sitting in chair with towel roll   -LC      Time (Minutes) 7 (P)  2  -LC      Additional Comments (P)  Added to HEP  -LC        User Key  (r) = Recorded By, (t) = Taken By, (c) = Cosigned By    Initials Name Provider Type    LC  Meghan Bashir, PTA Student PTA Student                        Manual Rx (last 36 hours)      Manual Treatments       11/16/17 0900          Manual Rx 1    Manual Rx 1 Location (P)  STM to the lumbar spine prone  -LC      Manual Rx 1 Grade (P)  min-mod  -LC      Manual Rx 1 Duration (P)  10  -LC      Manual Rx 2    Manual Rx 2 Location (P)  L piriformis with ratchet roller   -LC      Manual Rx 2 Grade (P)  min-mod  -LC      Manual Rx 2 Duration (P)  8  -LC        User Key  (r) = Recorded By, (t) = Taken By, (c) = Cosigned By    Initials Name Provider Type    LC Meghan Bashir, PTA Student PTA Student                PT OP Goals       11/16/17 1100 11/16/17 1009    PT Short Term Goals    STG Date to Achieve (P)  11/17/17  -LC     STG 1 (P)  Intermittent right leg symptoms 50% of the day  -LC     STG 1 Progress (P)  Ongoing  -LC     STG 2 (P)  Able to tolerate prone pressup 50% without increased back pain  -LC     STG 2 Progress (P)  Ongoing  -LC     Long Term Goals    LTG Date to Achieve (P)  12/08/17  -LC     LTG 1 (P)  No right leg radicular symptoms for a week except occasional minor symptoms relieved with exercises  -LC     LTG 1 Progress (P)  Ongoing  -LC     LTG 2 (P)  Fully standing forward bend without flare of back or leg pain  -LC     LTG 2 Progress (P)  Ongoing  -LC     LTG 3 (P)  Full prone pressup without flare of back pain  -LC     LTG 3 Progress (P)  Ongoing  -LC     LTG 4 (P)  Gross MMT right LE 4+/5  -LC     LTG 4 Progress (P)  Ongoing  -LC     LTG 5 (P)  Able to walk without limping  -LC     LTG 5 Progress (P)  Ongoing  -LC     LTG 6 (P)  Ind with HEP for core/leg stability  -LC     LTG 6 Progress (P)  Ongoing  -LC     LTG 6 Progress Comments (P)  Patient stated she has not been working on HEP due to having three kids and being busy.  -LC     Time Calculation    PT Goal Re-Cert Due Date  (P)  11/26/17  -LC      User Key  (r) = Recorded By, (t) = Taken By, (c) = Cosigned By    Initials Name Provider Type     BETITO Bashir PTA Student PTA Student                Therapy Education       11/16/17 1000          Therapy Education    Education Details (P)  sitting with towel behind thoracic spine daily twice a day  -LC      Given (P)  Posture/body mechanics  -LC      Program (P)  New  -LC      How Provided (P)  Verbal  -LC      Level of Understanding (P)  Verbalized;Demonstrated  -LC        User Key  (r) = Recorded By, (t) = Taken By, (c) = Cosigned By    Initials Name Provider Type    BETITO Bashir PTA Student PTA Student                Time Calculation:   Start Time: (P) 1010  Stop Time: (P) 1103  Time Calculation (min): (P) 53 min  Total Timed Code Minutes- PT: (P) 53 minute(s)    Therapy Charges for Today     Code Description Service Date Service Provider Modifiers Qty    70876552896 HC PT THER PROC EA 15 MIN 11/16/2017 Meghan Bashir PTA Student GP 3    70759601148 HC PT MANUAL THERAPY EA 15 MIN 11/16/2017 Meghan Bashir PTA Student GP 1                    Meghan Bashir PTA Student  11/16/2017

## 2017-11-21 ENCOUNTER — HOSPITAL ENCOUNTER (OUTPATIENT)
Dept: PHYSICAL THERAPY | Facility: HOSPITAL | Age: 27
Setting detail: THERAPIES SERIES
Discharge: HOME | End: 2017-11-21

## 2017-11-21 DIAGNOSIS — M54.16 LUMBAR RADICULOPATHY: ICD-10-CM

## 2017-11-21 DIAGNOSIS — G89.29 CHRONIC BILATERAL LOW BACK PAIN WITH RIGHT-SIDED SCIATICA: Primary | ICD-10-CM

## 2017-11-21 DIAGNOSIS — M54.41 CHRONIC BILATERAL LOW BACK PAIN WITH RIGHT-SIDED SCIATICA: Primary | ICD-10-CM

## 2017-11-21 PROCEDURE — 97110 THERAPEUTIC EXERCISES: CPT | Performed by: PHYSICAL THERAPIST

## 2017-11-21 PROCEDURE — 97140 MANUAL THERAPY 1/> REGIONS: CPT | Performed by: PHYSICAL THERAPIST

## 2017-11-21 NOTE — THERAPY PROGRESS REPORT/RE-CERT
Outpatient Physical Therapy Ortho Progress Note   [facility]     Patient Name: Raquel Best  : 1990  MRN: [id number]  Today's Date: 2017      Visit Date: 2017    Visit Dx:    ICD-10-CM ICD-9-CM   1. Chronic bilateral low back pain with right-sided sciatica M54.41 724.2    G89.29 724.3     338.29   2. Lumbar radiculopathy M54.16 724.4       Patient Active Problem List   Diagnosis   • Chronic bilateral low back pain with right-sided sciatica   • Lumbar radiculopathy   • Smoker   • Degeneration of lumbar or lumbosacral intervertebral disc   • Normal body mass index (BMI)   • Right hip pain        Past Medical History:   Diagnosis Date   • ADHD (attention deficit hyperactivity disorder)    • Allergic    • Anxiety    • Arthritis    • Depression    • Factor V deficiency    • Gallbladder abscess    • History of ear infections    • Hypertension    • Low back sprain     around age 6   • MRSA (methicillin resistant Staphylococcus aureus)    • Strep throat    • Urinary tract infection         Past Surgical History:   Procedure Laterality Date   • ADENOIDECTOMY     • CHOLECYSTECTOMY     • DILATATION AND CURETTAGE     • TONSILLECTOMY     • WISDOM TOOTH EXTRACTION                               PT Assessment/Plan       17 0850       PT Assessment    Functional Limitations Impaired gait;Limitation in home management;Performance in self-care ADL;Performance in leisure activities;Limitations in functional capacity and performance  -TB     Impairments Pain;Posture;Range of motion;Sensation;Muscle strength;Joint mobility;Gait  -TB     Assessment Comments Her radicular symptoms are there but slightly improved. Her main block is in her mid lumbar around L2/3 which would be consistent with where her radicular pain goes to. We have been trying flexion to open this up but today we tried more of extension to see if this would centralize better.  -TB     Please refer to paper survey for additional self-reported  "information Yes  -TB     Rehab Potential Good  -TB     Patient/caregiver participated in establishment of treatment plan and goals Yes  -TB     Patient would benefit from skilled therapy intervention Yes  -TB     PT Plan    PT Frequency 2x/week;3x/week  -TB     Predicted Duration of Therapy Intervention (days/wks) 4 weeks  -TB     Planned CPT's? PT THER PROC EA 15 MIN: 20248;PT THER ACT EA 15 MIN: 65351;PT MANUAL THERAPY EA 15 MIN: 48851;PT NEUROMUSC RE-EDUCATION EA 15 MIN: 80665;PT GAIT TRAINING EA 15 MIN: 35258;PT ELECTRICAL STIM UNATTEND: ;PT ELECTRICAL STIM ATTD EA 15 MIN: 47866;PT ULTRASOUND EA 15 MIN: 43039  -TB     PT Plan Comments assess the effectiveness of the tape to see if this helps control her pain better. Continue to work on improving extension. May try the REPEX table.   -TB       User Key  (r) = Recorded By, (t) = Taken By, (c) = Cosigned By    Initials Name Provider Type    MARYCRUZ Singh, PT Physical Therapist                    Exercises       11/21/17 0850          Subjective Comments    Subjective Comments She says after the \"hip alignment\" last treatment, she hurt for 3 days. She couldn't bend over for 3 days due to pain. She went hunting this weekend.    -TB      Subjective Pain    Pre-Treatment Pain Level 3  -TB      Subjective Pain Comment She is not having radicular pain down her leg this morning.It's mostly in her back and hip today.  -TB      Exercise 1    Exercise Name 1 assessed her directional preference and mobility; limited with standing flexion/ext as well as prone pressup; she c/o pain in her upper lumbar with attempts at EIL with a \"block\" there.   -TB      Exercise 2    Exercise Name 2 taped aaron lumbar with cover roll and leukotape  -TB      Time (Minutes) 2 5  -TB        User Key  (r) = Recorded By, (t) = Taken By, (c) = Cosigned By    Initials Name Provider Type    MARYCRUZ Singh, PT Physical Therapist                        Manual Rx (last 36 hours)      Manual " "Treatments       11/21/17 0850          Manual Rx 1    Manual Rx 1 Location STM to the lumbar spine prone  -TB      Manual Rx 1 Grade min-mod  -TB      Manual Rx 1 Duration 10  -TB      Manual Rx 2    Manual Rx 2 Location Sacral   -TB      Manual Rx 2 Type P/A mobilizations , PA with R vs. L bias  with and without manual lumbar traction and hips shifted to the L  -TB      Manual Rx 2 Grade 2   intermittent oscillations  -TB      Manual Rx 2 Duration 5  -TB      Manual Rx 3    Manual Rx 3 Location PA at mid to upper lumbar at level of \"block\"  -TB      Manual Rx 3 Grade 2-3 repetitive  -TB      Manual Rx 3 Duration 10  -TB      Manual Rx 4    Manual Rx 4 Location prone thoracic  -TB      Manual Rx 4 Type PA   -TB      Manual Rx 4 Grade 3 repetitive with multiple cavitations  -TB      Manual Rx 4 Duration 5  -TB        User Key  (r) = Recorded By, (t) = Taken By, (c) = Cosigned By    Initials Name Provider Type    TB Eber Singh, PT Physical Therapist                PT OP Goals       11/21/17 0850 11/21/17 0845    PT Short Term Goals    STG Date to Achieve --  -TB 11/17/17  -TB    STG 1 --  -TB Intermittent right leg symptoms 50% of the day  -TB    STG 1 Progress --  -TB Ongoing  -TB    STG 1 Progress Comments  having daily leg symptoms worsening with sitting with her right leg under under; averages 4-5x/day lasting a few minutes; her right hip is constant  -TB    STG 2 --  -TB Able to tolerate prone pressup 50% without increased back pain  -TB    STG 2 Progress --  -TB Met  -TB    STG 2 Progress Comments  up to 50% before stopped by pain  -TB    Long Term Goals    LTG Date to Achieve --  -TB 12/08/17  -TB    LTG 1 --  -TB No right leg radicular symptoms for a week except occasional minor symptoms relieved with exercises  -TB    LTG 1 Progress --  -TB Ongoing  -TB    LTG 1 Progress Comments  see STG  -TB    LTG 2 --  -TB Fully standing forward bend without flare of back or leg pain  -TB    LTG 2 Progress --  -TB " Ongoing  -TB    LTG 2 Progress Comments  still   -TB    LTG 3 --  -TB Full prone pressup without flare of back pain  -TB    LTG 3 Progress --  -TB Ongoing  -TB    LTG 3 Progress Comments  constant back and right hip pain  -TB    LTG 4 --  -TB Gross MMT right LE 4+/5  -TB    LTG 4 Progress --  -TB Ongoing  -TB    LTG 5 --  -TB Able to walk without limping  -TB    LTG 5 Progress --  -TB Ongoing  -TB    LTG 5 Progress Comments  still limping  -TB    LTG 6 --  -TB Ind with HEP for core/leg stability  -TB    LTG 6 Progress --  -TB Ongoing  -TB    LTG 6 Progress Comments  focusing on posture and core isometrics as this tends to give her some relief  -TB    Time Calculation    PT Goal Re-Cert Due Date  12/21/17  -TB      User Key  (r) = Recorded By, (t) = Taken By, (c) = Cosigned By    Initials Name Provider Type    TB Eber Singh PT Physical Therapist                Therapy Education       11/21/17 0850          Therapy Education    Education Details GIDEON vs EIL  -TB      Given Symptoms/condition management  -TB      Program Modified  -TB      How Provided Verbal  -TB      Provided to Patient  -TB      Level of Understanding Verbalized;Demonstrated  -TB        User Key  (r) = Recorded By, (t) = Taken By, (c) = Cosigned By    Initials Name Provider Type    TB Eber Singh PT Physical Therapist                Time Calculation:   Start Time: 0850  Stop Time: 0930  Time Calculation (min): 40 min  Total Timed Code Minutes- PT: 40 minute(s)    Therapy Charges for Today     Code Description Service Date Service Provider Modifiers Qty    51904041407 HC PT MANUAL THERAPY EA 15 MIN 11/21/2017 Eber Singh, PT GP 2    04231797892 HC PT THER PROC EA 15 MIN 11/21/2017 Eber Singh, PT GP 1                    Eber Singh, PT  11/21/2017

## 2017-11-28 ENCOUNTER — APPOINTMENT (OUTPATIENT)
Dept: PHYSICAL THERAPY | Facility: HOSPITAL | Age: 27
End: 2017-11-28

## 2017-11-29 ENCOUNTER — HOSPITAL ENCOUNTER (OUTPATIENT)
Dept: PHYSICAL THERAPY | Facility: HOSPITAL | Age: 27
Setting detail: THERAPIES SERIES
Discharge: HOME | End: 2017-11-29

## 2017-11-29 DIAGNOSIS — M54.16 LUMBAR RADICULOPATHY: ICD-10-CM

## 2017-11-29 DIAGNOSIS — G89.29 CHRONIC BILATERAL LOW BACK PAIN WITH RIGHT-SIDED SCIATICA: Primary | ICD-10-CM

## 2017-11-29 DIAGNOSIS — M54.41 CHRONIC BILATERAL LOW BACK PAIN WITH RIGHT-SIDED SCIATICA: Primary | ICD-10-CM

## 2017-11-29 PROCEDURE — 97140 MANUAL THERAPY 1/> REGIONS: CPT

## 2017-11-29 PROCEDURE — 97110 THERAPEUTIC EXERCISES: CPT

## 2017-11-29 NOTE — THERAPY TREATMENT NOTE
Outpatient Physical Therapy Ortho Treatment Note   [facility]     Patient Name: Raquel Best  : 1990  MRN: [id number]  Today's Date: 2017      Visit Date: 2017    Visit Dx:    ICD-10-CM ICD-9-CM   1. Chronic bilateral low back pain with right-sided sciatica M54.41 724.2    G89.29 724.3     338.29   2. Lumbar radiculopathy M54.16 724.4       Patient Active Problem List   Diagnosis   • Chronic bilateral low back pain with right-sided sciatica   • Lumbar radiculopathy   • Smoker   • Degeneration of lumbar or lumbosacral intervertebral disc   • Normal body mass index (BMI)   • Right hip pain        Past Medical History:   Diagnosis Date   • ADHD (attention deficit hyperactivity disorder)    • Allergic    • Anxiety    • Arthritis    • Depression    • Factor V deficiency    • Gallbladder abscess    • History of ear infections    • Hypertension    • Low back sprain     around age 6   • MRSA (methicillin resistant Staphylococcus aureus)    • Strep throat    • Urinary tract infection         Past Surgical History:   Procedure Laterality Date   • ADENOIDECTOMY     • CHOLECYSTECTOMY     • DILATATION AND CURETTAGE     • TONSILLECTOMY     • WISDOM TOOTH EXTRACTION                               PT Assessment/Plan       17 1400       PT Assessment    Assessment Comments (P)  Patient continues to present with radicular symptoms down her left LE down to her toes. She exhibits a significant weak core and weak B hips with ther ex and requires cues to maintain TA contractions. We used the Repex table today in attempt to centralize her symptoms. We started on the lowest speed; however, due to continued radicular symptoms and discomfort, we increased the speed to 4 to limit the amount of time in one position.  At cycle 31, she continued to have complaints of radiucular symptoms; therefore we ceased the intervention. Patient also mentioned she will begin treatments at the pain management center tomorrow.  -BETITO      PT Plan    PT Plan Comments (P)  We will continue to strengthen her weak core and hips to decrease strain on her low back. We will also continue to attempt to centralize her radicular symptoms.  -LC       User Key  (r) = Recorded By, (t) = Taken By, (c) = Cosigned By    Initials Name Provider Type    BETITO Bashir PTA Student PTA Student                    Exercises       11/29/17 1400          Subjective Comments    Subjective Comments (P)  Patient reports slight tingling in her toes on her left foot. She also said she had to remove the taping due to increased pain on the first day. She said it was pulling on her too much and she could not get comfortable.  -LC      Subjective Pain    Able to rate subjective pain? (P)  yes  -LC      Pre-Treatment Pain Level (P)  5  -LC      Post-Treatment Pain Level (P)  6  -LC      Exercise 1    Exercise Name 1 (P)  B unilateral Marches in hooklying  -LC      Cueing 1 (P)  Verbal  -LC      Sets 1 (P)  1  -LC      Reps 1 (P)  10  -LC      Exercise 2    Exercise Name 2 (P)  hooklying bridges with TA contractions  -LC      Time (Seconds) 2 (P)  3 sec hold  -LC      Exercise 3    Exercise Name 3 (P)  Hooklying hip abduction with green ball  -LC      Cueing 3 (P)  Verbal;Tactile  -LC      Sets 3 (P)  1  -LC      Reps 3 (P)  10  -LC      Time (Seconds) 3 (P)  3 sec hold  -LC      Exercise 4    Exercise Name 4 (P)  Repex 50 cycles; head and leg 8 degrees    head and foot positioned at 5; speed 2  -LC      Additional Comments (P)  Patient able to complete 31 cylces, she reported she was still experiencing numbness and tingling into B LEs. After cylce 40 we increased the speed to 4 in order to decrease the amount of time in each position.  -LC        User Key  (r) = Recorded By, (t) = Taken By, (c) = Cosigned By    Initials Name Provider Type    BETITO Bashir PTA Student PTA Student                        Manual Rx (last 36 hours)      Manual Treatments       11/29/17 3365           Manual Rx 1    Manual Rx 1 Location (P)  lumbar paraspinals prone  -LC      Manual Rx 1 Type (P)  STM  -LC      Manual Rx 1 Grade (P)  min-mod  -LC      Manual Rx 1 Duration (P)  10  -LC        User Key  (r) = Recorded By, (t) = Taken By, (c) = Cosigned By    Initials Name Provider Type    LC Meghan Bashir PTA Student PTA Student                PT OP Goals       11/29/17 1345 11/29/17 1300    PT Short Term Goals    STG Date to Achieve (P)  11/17/17  -LC     STG 1 (P)  Intermittent right leg symptoms 50% of the day  -LC     STG 1 Progress (P)  Ongoing  -LC     STG 2 (P)  Able to tolerate prone pressup 50% without increased back pain  -LC     STG 2 Progress (P)  Ongoing  -LC     Long Term Goals    LTG Date to Achieve (P)  12/08/17  -LC     LTG 1 (P)  No right leg radicular symptoms for a week except occasional minor symptoms relieved with exercises  -LC     LTG 1 Progress (P)  Ongoing  -LC     LTG 1 Progress Comments (P)  Patient continues to experience left LE radicular everyday.  -LC     LTG 2 (P)  Fully standing forward bend without flare of back or leg pain  -LC     LTG 2 Progress (P)  Ongoing  -LC     LTG 3 (P)  Full prone pressup without flare of back pain  -LC     LTG 3 Progress (P)  Ongoing  -LC     LTG 4 (P)  Gross MMT right LE 4+/5  -LC     LTG 4 Progress (P)  Ongoing  -LC     LTG 5 (P)  Able to walk without limping  -LC     LTG 5 Progress (P)  Ongoing  -LC     LTG 6 (P)  Ind with HEP for core/leg stability  -LC     LTG 6 Progress (P)  Ongoing  -LC     Time Calculation    PT Goal Re-Cert Due Date  (P)  12/21/17  -LC      User Key  (r) = Recorded By, (t) = Taken By, (c) = Cosigned By    Initials Name Provider Type    BETITO Bashir, PTA Student PTA Student                Therapy Education       11/29/17 1400          Therapy Education    Given (P)  Symptoms/condition management  -LC      Program (P)  Reinforced  -LC      How Provided (P)  Verbal  -LC      Provided to (P)  Patient  -LC      Level of  Understanding (P)  Verbalized;Demonstrated  -        User Key  (r) = Recorded By, (t) = Taken By, (c) = Cosigned By    Initials Name Provider Type    BETITO Bashir PTA Student PTA Student                Time Calculation:   Start Time: (P) 1347  Stop Time: (P) 1432  Time Calculation (min): (P) 45 min  Total Timed Code Minutes- PT: (P) 45 minute(s)    Therapy Charges for Today     Code Description Service Date Service Provider Modifiers Qty    11644805769 HC PT THER PROC EA 15 MIN 11/29/2017 Meghan Bashir PTA Student GP 2    33173852231 HC PT MANUAL THERAPY EA 15 MIN 11/29/2017 Meghan Bashir PTA Student GP 1                    Meghan Bashir PTA Student  11/30/2017

## 2017-11-30 ENCOUNTER — HOSPITAL ENCOUNTER (OUTPATIENT)
Dept: GENERAL RADIOLOGY | Facility: HOSPITAL | Age: 27
Discharge: HOME | End: 2017-11-30
Admitting: NURSE PRACTITIONER

## 2017-11-30 ENCOUNTER — APPOINTMENT (OUTPATIENT)
Dept: PHYSICAL THERAPY | Facility: HOSPITAL | Age: 27
End: 2017-11-30

## 2017-11-30 DIAGNOSIS — M25.551 RIGHT HIP PAIN: ICD-10-CM

## 2017-11-30 PROCEDURE — 73502 X-RAY EXAM HIP UNI 2-3 VIEWS: CPT

## 2017-12-01 ENCOUNTER — HOSPITAL ENCOUNTER (OUTPATIENT)
Dept: PHYSICAL THERAPY | Facility: HOSPITAL | Age: 27
Setting detail: THERAPIES SERIES
End: 2017-12-01

## 2017-12-01 ENCOUNTER — HOSPITAL ENCOUNTER (OUTPATIENT)
Dept: PHYSICAL THERAPY | Facility: HOSPITAL | Age: 27
Setting detail: THERAPIES SERIES
Discharge: HOME | End: 2017-12-01

## 2017-12-01 PROCEDURE — 97110 THERAPEUTIC EXERCISES: CPT

## 2017-12-01 NOTE — THERAPY TREATMENT NOTE
Outpatient Physical Therapy Ortho Treatment Note   [facility]     Patient Name: Raquel Best  : 1990  MRN: [id number]  Today's Date: 2017      Visit Date: 2017    Visit Dx:  No diagnosis found.    Patient Active Problem List   Diagnosis   • Chronic bilateral low back pain with right-sided sciatica   • Lumbar radiculopathy   • Smoker   • Degeneration of lumbar or lumbosacral intervertebral disc   • Normal body mass index (BMI)   • Right hip pain        Past Medical History:   Diagnosis Date   • ADHD (attention deficit hyperactivity disorder)    • Allergic    • Anxiety    • Arthritis    • Depression    • Factor V deficiency    • Gallbladder abscess    • History of ear infections    • Hypertension    • Low back sprain     around age 6   • MRSA (methicillin resistant Staphylococcus aureus)    • Strep throat    • Urinary tract infection         Past Surgical History:   Procedure Laterality Date   • ADENOIDECTOMY     • CHOLECYSTECTOMY     • DILATATION AND CURETTAGE     • TONSILLECTOMY     • WISDOM TOOTH EXTRACTION                               PT Assessment/Plan       17 1700       PT Assessment    Assessment Comments Per her report and request I did palpate figueroa area along her spine from T11 to L 2 and felt a soft seemingly fluid filled area but she would not allow me to apply much pressure so while I was able to rule out such things as soft tissue injury, trigger point or muscular in nature I could not fully identify the abnormality present. She was much more flared after her diagnostic proceedure performed earlier today and exhibited a very guarded gait with a guarded posture nd avoided fully weight shifting onto her R LE. On a positive note figure eight lumbar traction and LAD of the R LE did relieve her symptoms per her report and we will likely need to utilize these techniques prior to therex in her immediate upcoming sessions   -EC     PT Plan    PT Plan Comments See assessment comments  as well as continue a conservative progression to avoid further flares.  -EC       User Key  (r) = Recorded By, (t) = Taken By, (c) = Cosigned By    Initials Name Provider Type    MANUEL Haque PTA Physical Therapy Assistant                    Exercises       12/01/17 1500          Subjective Comments    Subjective Comments She states she had a NCV study at 11:00 and has hurt badly since. She reports pain in her R calf and front of her thigh and hip and has low back pain as well  -EC      Subjective Pain    Able to rate subjective pain? yes  -EC      Pre-Treatment Pain Level 8  -EC      Post-Treatment Pain Level 5  -EC        User Key  (r) = Recorded By, (t) = Taken By, (c) = Cosigned By    Initials Name Provider Type    MANUEL Haque PTA Physical Therapy Assistant                        Manual Rx (last 36 hours)      Manual Treatments       12/01/17 1500          Manual Rx 1    Manual Rx 1 Location B lower lumbar region  -EC      Manual Rx 1 Type STM in prone  -EC      Manual Rx 1 Duration 14  -EC      Manual Rx 2    Manual Rx 2 Location B glute and piriformis  -EC      Manual Rx 2 Type STM in prone with ratchet roller  -EC      Manual Rx 2 Duration 10  -EC      Manual Rx 3    Manual Rx 3 Location figure 8 lumbar traction   -EC      Manual Rx 3 Grade 2  -EC      Manual Rx 3 Duration 5  -EC      Manual Rx 4    Manual Rx 4 Location R LE   -EC      Manual Rx 4 Type LAD  -EC      Manual Rx 4 Grade 2   -EC      Manual Rx 4 Duration 5  -EC      Manual Rx 5    Manual Rx 5 Location R anterior and anterior/lateral thigh  -EC      Manual Rx 5 Type STM with ratchet roller in supine  -EC      Manual Rx 5 Duration 6  -EC        User Key  (r) = Recorded By, (t) = Taken By, (c) = Cosigned By    Initials Name Provider Type    MANUEL Haque PTA Physical Therapy Assistant                PT OP Goals       12/01/17 1536       PT Short Term Goals    STG Date to Achieve 11/17/17  -EC     STG 1 Intermittent right leg  symptoms 50% of the day  -EC     STG 1 Progress Ongoing  -EC     STG 1 Progress Comments R LE symptoms were increasaed post NCV study  -EC     STG 2 Able to tolerate prone pressup 50% without increased back pain  -EC     STG 2 Progress Ongoing  -EC     Long Term Goals    LTG Date to Achieve 12/08/17  -EC     LTG 1 No right leg radicular symptoms for a week except occasional minor symptoms relieved with exercises  -EC     LTG 1 Progress Ongoing  -EC     LTG 2 Fully standing forward bend without flare of back or leg pain  -EC     LTG 2 Progress Ongoing  -EC     LTG 3 Full prone pressup without flare of back pain  -EC     LTG 3 Progress Ongoing  -EC     LTG 4 Gross MMT right LE 4+/5  -EC     LTG 4 Progress Ongoing  -EC     LTG 5 Able to walk without limping  -EC     LTG 5 Progress Ongoing  -EC     LTG 6 Ind with HEP for core/leg stability  -EC     LTG 6 Progress Ongoing  -EC     Time Calculation    PT Goal Re-Cert Due Date 12/21/17  -EC       User Key  (r) = Recorded By, (t) = Taken By, (c) = Cosigned By    Initials Name Provider Type    EC Milad Haque PTA Physical Therapy Assistant                Therapy Education       12/01/17 1700          Therapy Education    Given Symptoms/condition management;Pain management;Posture/body mechanics  -EC      How Provided Verbal  -EC      Provided to Patient  -EC      Level of Understanding Verbalized  -EC        User Key  (r) = Recorded By, (t) = Taken By, (c) = Cosigned By    Initials Name Provider Type    EC Milad Haque PTA Physical Therapy Assistant                Time Calculation:   Start Time: 1536  Stop Time: 1616  Time Calculation (min): 40 min  Total Timed Code Minutes- PT: 40 minute(s)    Therapy Charges for Today     Code Description Service Date Service Provider Modifiers Qty    88305299224 HC PT THER PROC EA 15 MIN 12/1/2017 Milad Haque PTA GP 3                    Milad Haque PTA  12/1/2017

## 2017-12-05 ENCOUNTER — HOSPITAL ENCOUNTER (OUTPATIENT)
Dept: PHYSICAL THERAPY | Facility: HOSPITAL | Age: 27
Setting detail: THERAPIES SERIES
Discharge: HOME | End: 2017-12-05

## 2017-12-05 DIAGNOSIS — G89.29 CHRONIC BILATERAL LOW BACK PAIN WITH RIGHT-SIDED SCIATICA: ICD-10-CM

## 2017-12-05 DIAGNOSIS — M54.41 CHRONIC BILATERAL LOW BACK PAIN WITH RIGHT-SIDED SCIATICA: ICD-10-CM

## 2017-12-05 DIAGNOSIS — M54.41 CHRONIC BILATERAL LOW BACK PAIN WITH RIGHT-SIDED SCIATICA: Primary | ICD-10-CM

## 2017-12-05 DIAGNOSIS — G89.29 CHRONIC BILATERAL LOW BACK PAIN WITH RIGHT-SIDED SCIATICA: Primary | ICD-10-CM

## 2017-12-05 DIAGNOSIS — M51.37 DEGENERATION OF LUMBAR OR LUMBOSACRAL INTERVERTEBRAL DISC: ICD-10-CM

## 2017-12-05 DIAGNOSIS — M54.16 LUMBAR RADICULOPATHY: ICD-10-CM

## 2017-12-05 DIAGNOSIS — M25.551 RIGHT HIP PAIN: ICD-10-CM

## 2017-12-05 PROCEDURE — 97140 MANUAL THERAPY 1/> REGIONS: CPT

## 2017-12-05 PROCEDURE — 97110 THERAPEUTIC EXERCISES: CPT

## 2017-12-05 NOTE — THERAPY TREATMENT NOTE
Outpatient Physical Therapy Ortho Treatment Note  [facility]     Patient Name: Raquel Best  : 1990  MRN: [id number]  Today's Date: 2017      Visit Date: 2017    Visit Dx:    ICD-10-CM ICD-9-CM   1. Chronic bilateral low back pain with right-sided sciatica M54.41 724.2    G89.29 724.3     338.29   2. Lumbar radiculopathy M54.16 724.4       Patient Active Problem List   Diagnosis   • Chronic bilateral low back pain with right-sided sciatica   • Lumbar radiculopathy   • Smoker   • Degeneration of lumbar or lumbosacral intervertebral disc   • Normal body mass index (BMI)   • Right hip pain        Past Medical History:   Diagnosis Date   • ADHD (attention deficit hyperactivity disorder)    • Allergic    • Anxiety    • Arthritis    • Depression    • Factor V deficiency    • Gallbladder abscess    • History of ear infections    • Hypertension    • Low back sprain     around age 6   • MRSA (methicillin resistant Staphylococcus aureus)    • Strep throat    • Urinary tract infection         Past Surgical History:   Procedure Laterality Date   • ADENOIDECTOMY     • CHOLECYSTECTOMY     • DILATATION AND CURETTAGE     • TONSILLECTOMY     • WISDOM TOOTH EXTRACTION                               PT Assessment/Plan       17 1352       PT Assessment    Assessment Comments Patient reports she continues to have increased pain into her right foot since her nerve conduction test.  She also continues to have pain in her right hip and knee.  She did have slight improvement of pain following today's treatment.  At this point her posture is a huge limiting factor for her continual low back pain.  However, to this point she has not been compliant with her HEP due to little time to complete exercises per report.  -ROBBY     PT Plan    PT Plan Comments Next session is her last scheduled at this time, we will either need to place on hold or continue with more authorized visits.  -ROBBY       User Key  (r) = Recorded By,  (t) = Taken By, (c) = Cosigned By    Initials Name Provider Type    ROBBY Baker PTA Physical Therapy Assistant                    Exercises       12/05/17 1352          Subjective Comments    Subjective Comments Patient reports she has had a lot of right foot pain since her nerve conduction test Friday.   She reports her right hip hurts more than her low back today, but she does have pain into her low back too. She reports she hasn't been compliant with HEP due to time constraints.  -ROBBY      Subjective Pain    Able to rate subjective pain? yes  -ROBBY      Pre-Treatment Pain Level 5  -ROBBY      Post-Treatment Pain Level 4  -ROBBY      Exercise 1    Exercise Name 1 seated thoracic towel stretch  in straight back chair  -ROBBY      Cueing 1 Verbal;Tactile  -ROBBY      Time (Minutes) 1 2 minutes  -ROBBY      Exercise 2    Exercise Name 2 verbally reviewed components for HEP, with emphasis on her posture and making it a priority to work on  -ROBBY        User Key  (r) = Recorded By, (t) = Taken By, (c) = Cosigned By    Initials Name Provider Type    ROBBY Baker PTA Physical Therapy Assistant                        Manual Rx (last 36 hours)      Manual Treatments       12/05/17 1359          Manual Rx 1    Manual Rx 1 Location B lower lumbar region  -ROBBY      Manual Rx 1 Type STM in prone with free-up  -ROBBY      Manual Rx 1 Grade min-mod  -ROBBY      Manual Rx 1 Duration 10  -ROBBY      Manual Rx 2    Manual Rx 2 Location B glute and piriformis  -ROBBY      Manual Rx 2 Type STM in prone with small white foam roller  -ROBBY      Manual Rx 2 Duration 10  -ROBBY      Manual Rx 3    Manual Rx 3 Location figure 8 lumbar traction   -ROBBY      Manual Rx 3 Grade 2  -ROBBY      Manual Rx 3 Duration 5  -ROBBY        User Key  (r) = Recorded By, (t) = Taken By, (c) = Cosigned By    Initials Name Provider Type    ROBBY Baker PTA Physical Therapy Assistant                PT OP Goals       12/05/17 1352       PT Short Term Goals    STG Date to Achieve  11/17/17  -ROBBY     STG 1 Intermittent right leg symptoms 50% of the day  -ROBBY     STG 1 Progress Ongoing  -ROBBY     STG 1 Progress Comments She reports her right foot has been hurting her worse since her nerve conduction test this past Friday.     -ROBBY     STG 2 Able to tolerate prone pressup 50% without increased back pain  -ROBBY     STG 2 Progress Ongoing  -ROBBY     Long Term Goals    LTG Date to Achieve 12/08/17  -ROBBY     LTG 1 No right leg radicular symptoms for a week except occasional minor symptoms relieved with exercises  -ROBBY     LTG 1 Progress Ongoing  -ROBBY     LTG 2 Fully standing forward bend without flare of back or leg pain  -ROBBY     LTG 2 Progress Ongoing  -ROBBY     LTG 3 Full prone pressup without flare of back pain  -ROBBY     LTG 3 Progress Ongoing  -ROBBY     LTG 4 Gross MMT right LE 4+/5  -ROBBY     LTG 4 Progress Ongoing  -ROBBY     LTG 5 Able to walk without limping  -ROBBY     LTG 5 Progress Ongoing  -ROBBY     LTG 6 Ind with HEP for core/leg stability  -ROBBY     LTG 6 Progress Ongoing  -ROBBY     Time Calculation    PT Goal Re-Cert Due Date 12/21/17  -ROBBY       User Key  (r) = Recorded By, (t) = Taken By, (c) = Cosigned By    Initials Name Provider Type    ROBBY Baker PTA Physical Therapy Assistant                Therapy Education       12/05/17 1349          Therapy Education    Education Details reviewed importance of HEP with seated thoracic towel stretch and postural awareness  -ROBBY      Given HEP  -ROBBY      Program Reinforced  -ROBBY      How Provided Verbal  -ROBBY      Provided to Patient  -ROBBY      Level of Understanding Verbalized;Demonstrated  -ROBBY        User Key  (r) = Recorded By, (t) = Taken By, (c) = Cosigned By    Initials Name Provider Type    ROBBY Baker PTA Physical Therapy Assistant                Time Calculation:   Start Time: 1352  Stop Time: 1432  Time Calculation (min): 40 min  Total Timed Code Minutes- PT: 40 minute(s)    Therapy Charges for Today     Code Description Service Date Service Provider  Modifiers Qty    42164962359  PT MANUAL THERAPY EA 15 MIN 12/5/2017 Sim Baker, PTA GP 1    35905434049 HC PT THER PROC EA 15 MIN 12/5/2017 Sim Baker, PTA GP 1    48574896283 HC PT THER SUPP EA 15 MIN 12/5/2017 Sim Baker, PTA GP 1                    Sim Baker, PTA  12/5/2017

## 2017-12-07 ENCOUNTER — HOSPITAL ENCOUNTER (OUTPATIENT)
Dept: PHYSICAL THERAPY | Facility: HOSPITAL | Age: 27
Setting detail: THERAPIES SERIES
Discharge: HOME | End: 2017-12-07

## 2017-12-07 DIAGNOSIS — M54.16 LUMBAR RADICULOPATHY: ICD-10-CM

## 2017-12-07 DIAGNOSIS — G89.29 CHRONIC BILATERAL LOW BACK PAIN WITH RIGHT-SIDED SCIATICA: Primary | ICD-10-CM

## 2017-12-07 DIAGNOSIS — M54.41 CHRONIC BILATERAL LOW BACK PAIN WITH RIGHT-SIDED SCIATICA: Primary | ICD-10-CM

## 2017-12-07 PROCEDURE — 97110 THERAPEUTIC EXERCISES: CPT

## 2017-12-07 PROCEDURE — 97140 MANUAL THERAPY 1/> REGIONS: CPT

## 2017-12-07 NOTE — THERAPY TREATMENT NOTE
Outpatient Physical Therapy Ortho Treatment Note  [facility]     Patient Name: Raquel Best  : 1990  MRN: [id number]  Today's Date: 2017      Visit Date: 2017    Visit Dx:    ICD-10-CM ICD-9-CM   1. Chronic bilateral low back pain with right-sided sciatica M54.41 724.2    G89.29 724.3     338.29   2. Lumbar radiculopathy M54.16 724.4       Patient Active Problem List   Diagnosis   • Chronic bilateral low back pain with right-sided sciatica   • Lumbar radiculopathy   • Smoker   • Degeneration of lumbar or lumbosacral intervertebral disc   • Normal body mass index (BMI)   • Right hip pain        Past Medical History:   Diagnosis Date   • ADHD (attention deficit hyperactivity disorder)    • Allergic    • Anxiety    • Arthritis    • Depression    • Factor V deficiency    • Gallbladder abscess    • History of ear infections    • Hypertension    • Low back sprain     around age 6   • MRSA (methicillin resistant Staphylococcus aureus)    • Strep throat    • Urinary tract infection         Past Surgical History:   Procedure Laterality Date   • ADENOIDECTOMY     • CHOLECYSTECTOMY     • DILATATION AND CURETTAGE     • TONSILLECTOMY     • WISDOM TOOTH EXTRACTION                               PT Assessment/Plan       17 1018       PT Assessment    Assessment Comments Patient presented with improved overall symptoms today, which was localized in her right hip and knee,  but did not travel from her back down the leg until the end of treatment.  At the end of today's session she did have slight increased right hip pain and radiated into her thigh, but not down the leg. She tolerated interventions today better as well. Her posture was a main focus of today's treamtent due to her increased kyphosis being a big contributing factor into her lower lumbar pain.  The REPEX table was used today for passive extension, but her symptoms remained about the same before and after the REPEX table.    -ROBBY     PT Plan     PT Plan Comments We are asking for more visits to be authorized in order to progress with hip/core strengthening and postural education.  We will follow up with response today to see if her radicular symptoms stay down.  -ROBBY       User Key  (r) = Recorded By, (t) = Taken By, (c) = Cosigned By    Initials Name Provider Type    ROBBY Baker PTA Physical Therapy Assistant                    Exercises       12/07/17 1018          Subjective Comments    Subjective Comments Patients reports she is tired this morning.  She reports her R hip and knee is hurting today.  She reports she thinks the cold weather is the cause of her pain today.  She reports she does not have symptoms down the leg currently.  She reports she has her first apppointment with pain management today.  -ROBBY      Subjective Pain    Able to rate subjective pain? yes  -ROBBY      Pre-Treatment Pain Level 6  -ROBBY      Post-Treatment Pain Level 7  -ROBBY      Exercise 1    Exercise Name 1 reclined position: thoracic towel roll stretch  -ROBBY      Cueing 1 Verbal;Tactile  -ROBBY      Time (Minutes) 1 5 minutes  -ROBBY      Exercise 2    Exercise Name 2 REPEX table  -ROBBY      Cueing 2 Verbal;Tactile  -ROBBY      Additional Comments 5/15/5, speed 2-3  -ROBBY      Exercise 3    Exercise Name 3 GIDEON  -ROBBY      Cueing 3 Verbal;Tactile  -ROBBY      Time (Minutes) 3 2  -ROBBY      Additional Comments she reports increased pain into R hip and SI with some radiating pain into right thigh  -ROBBY        User Key  (r) = Recorded By, (t) = Taken By, (c) = Cosigned By    Initials Name Provider Type    ROBBY Sim Baker PTA Physical Therapy Assistant                        Manual Rx (last 36 hours)      Manual Treatments       12/07/17 1022          Manual Rx 1    Manual Rx 1 Location B lower lumbar region  -ROBBY      Manual Rx 1 Type STM in prone with free-up  -ROBBY      Manual Rx 1 Grade min-mod  -ROBBY      Manual Rx 1 Duration 9  -ROBBY        User Key  (r) = Recorded By, (t) = Taken By, (c) =  Cosigned By    Initials Name Provider Type    ROBBY Baker PTA Physical Therapy Assistant                PT OP Goals       12/07/17 1018       PT Short Term Goals    STG Date to Achieve 11/17/17  -ROBBY     STG 1 Intermittent right leg symptoms 50% of the day  -ROBBY     STG 1 Progress Ongoing  -ROBBY     STG 1 Progress Comments She reports her pain today was only right hip and right knee, but not traveling down the leg. She did have slight radiating pain into thigh at the end.  -ROBBY     STG 2 Able to tolerate prone pressup 50% without increased back pain  -ROBBY     STG 2 Progress Ongoing  -ROBBY     STG 2 Progress Comments She reports increased right hip pain.  -ROBBY     Long Term Goals    LTG Date to Achieve 12/08/17  -ROBBY     LTG 1 No right leg radicular symptoms for a week except occasional minor symptoms relieved with exercises  -ROBBY     LTG 1 Progress Ongoing  -ROBBY     LTG 2 Fully standing forward bend without flare of back or leg pain  -ROBBY     LTG 2 Progress Ongoing  -ROBBY     LTG 3 Full prone pressup without flare of back pain  -ROBBY     LTG 3 Progress Ongoing  -ROBBY     LTG 4 Gross MMT right LE 4+/5  -ROBBY     LTG 4 Progress Ongoing  -ROBBY     LTG 5 Able to walk without limping  -ROBBY     LTG 5 Progress Ongoing  -ROBBY     LTG 6 Ind with HEP for core/leg stability  -ROBBY     LTG 6 Progress Ongoing  -ROBBY     Time Calculation    PT Goal Re-Cert Due Date 12/21/17  -ROBBY       User Key  (r) = Recorded By, (t) = Taken By, (c) = Cosigned By    Initials Name Provider Type    ROBBY Baker PTA Physical Therapy Assistant                Therapy Education       12/07/17 1215          Therapy Education    Given HEP;Posture/body mechanics  -ROBBY      Program Reinforced  -ROBBY      How Provided Verbal  -ROBBY      Provided to Patient  -ROBBY      Level of Understanding Verbalized  -ROBBY        User Key  (r) = Recorded By, (t) = Taken By, (c) = Cosigned By    Initials Name Provider Type    ROBBY Baker PTA Physical Therapy Assistant                 Time Calculation:   Start Time: 1018  Stop Time: 1100  Time Calculation (min): 42 min  Total Timed Code Minutes- PT: 42 minute(s)    Therapy Charges for Today     Code Description Service Date Service Provider Modifiers Qty    56545653439 HC PT MANUAL THERAPY EA 15 MIN 12/7/2017 Sim Baker, PTA GP 1    36424635456 HC PT THER PROC EA 15 MIN 12/7/2017 Sim Baker PTA GP 2                    Sim Baker PTA  12/7/2017

## 2017-12-27 ENCOUNTER — OFFICE VISIT (OUTPATIENT)
Dept: NEUROSURGERY | Facility: CLINIC | Age: 27
End: 2017-12-27

## 2017-12-27 VITALS
WEIGHT: 110 LBS | SYSTOLIC BLOOD PRESSURE: 116 MMHG | HEIGHT: 64 IN | DIASTOLIC BLOOD PRESSURE: 70 MMHG | BODY MASS INDEX: 18.78 KG/M2

## 2017-12-27 DIAGNOSIS — F17.200 SMOKER: ICD-10-CM

## 2017-12-27 DIAGNOSIS — M54.41 CHRONIC BILATERAL LOW BACK PAIN WITH RIGHT-SIDED SCIATICA: Primary | ICD-10-CM

## 2017-12-27 DIAGNOSIS — G89.29 CHRONIC BILATERAL LOW BACK PAIN WITH RIGHT-SIDED SCIATICA: Primary | ICD-10-CM

## 2017-12-27 DIAGNOSIS — M54.16 LUMBAR RADICULOPATHY: ICD-10-CM

## 2017-12-27 DIAGNOSIS — IMO0001 NORMAL BODY MASS INDEX (BMI): ICD-10-CM

## 2017-12-27 DIAGNOSIS — M25.551 RIGHT HIP PAIN: ICD-10-CM

## 2017-12-27 DIAGNOSIS — M51.37 DEGENERATION OF LUMBAR OR LUMBOSACRAL INTERVERTEBRAL DISC: ICD-10-CM

## 2017-12-27 PROCEDURE — 99213 OFFICE O/P EST LOW 20 MIN: CPT | Performed by: NURSE PRACTITIONER

## 2017-12-27 NOTE — PROGRESS NOTES
"Neurosurgery Follow Up Office Visit      Patient Name:  Raquel Best  Age:  27 y.o.  YOB: 1990  MR#:  [id number]    /70  Ht 162.6 cm (64\")  Wt 49.9 kg (110 lb)  BMI 18.88 kg/m2    Social History   Substance Use Topics   • Smoking status: Current Every Day Smoker     Packs/day: 0.50     Types: Cigarettes   • Smokeless tobacco: Never Used   • Alcohol use No       Chief Complaint   Patient presents with   • Follow-up     NCV/EMG results         History  Chief Complaint:  She returns today for follow-up and test results.  She continues having low back pain with pain focused about the right hip, and also mentions the right knee.  She still feels pain radiating down her right leg mostly in the lateral fashion.  She has completed her initial physical therapy which has been helping.  Right now her therapist is awaiting approval for additional sessions.  I initially reviewed a lumbar MRI showing mild disc degeneration and bulging at L5-S1, but I did not see a resulting compression or impingement.  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  She had required bracing and casting for a number of years and probably was not going to be able to walk.  We checked x-rays of the pelvis and right hip they do not show any obvious abnormality.  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  She had an initial appointment with pain management, but forgot about it.  She is in the process of rescheduling with the [facility].      Physical Examination:  Upon exam, she looks pretty good.  She is awake and alert, pleasant and cooperative, speech is clear and appropriate.  Skin is warm and dry.  She has one of her young children with her today and is constantly having to move about the exam room.  Straight leg raising is mildly positive on the right.  Deep " tendon reflexes a good 2+ patellar and 1-2+ at both ankles.  There is some weakness of right dorsiflexion and the foot is noted to rotate inward.  She ambulates with relatively steady gait.      Medical Decision Making  Treatment Options:   In the office we have discussed her care, and I reviewed her test results with her.  She has some nerve root irritation which may be more chronic appearing in nature.  Again, on her MRI I could not determine any acute or active lesion that would be surgical in nature.  I am leaning toward a lot of her issues being more developmental in nature and a result of her positioning in utero.  Clear to me that she currently has a surgical issue that would be expected to help her present complaints.  I think she will be better served by continued physical therapy and perhaps bracing as she is previously mentioned.  I also think she is still a very good candidate to have success with pain management efforts.  We will see her back after a trial of pain management in about 3 or 4 months with Dr. Bryan.  Based on her response to his review of her testing, possibly consider releasing her to an as needed basis.  She is agreeable.      Assessment and Plan  Raquel was seen today for follow-up.    Diagnoses and all orders for this visit:    Chronic bilateral low back pain with right-sided sciatica    Lumbar radiculopathy    Right hip pain    Degeneration of lumbar or lumbosacral intervertebral disc    Smoker    Normal body mass index (BMI)      Return in about 3 months (around 3/27/2018) for follow up with Dr. Bryan; possibly PRN after PM.      VON Malin

## 2018-01-04 ENCOUNTER — TELEPHONE (OUTPATIENT)
Dept: PHYSICAL THERAPY | Facility: HOSPITAL | Age: 28
End: 2018-01-04

## 2018-01-04 NOTE — TELEPHONE ENCOUNTER
I called the patient to inform her that authorization for more visits is still pending due to error.  I informed her of the delay and asked her to call back in order to confirm she wishes to continue with therapy before authorization is sent off.  A message was left on her machine to call our office back, and I apologized for the delay again.

## 2018-01-06 ENCOUNTER — OFFICE VISIT (OUTPATIENT)
Dept: URGENT CARE | Age: 28
End: 2018-01-06
Payer: MEDICAID

## 2018-01-06 VITALS
WEIGHT: 115 LBS | HEART RATE: 105 BPM | RESPIRATION RATE: 20 BRPM | BODY MASS INDEX: 19.63 KG/M2 | DIASTOLIC BLOOD PRESSURE: 85 MMHG | TEMPERATURE: 98.5 F | OXYGEN SATURATION: 99 % | HEIGHT: 64 IN | SYSTOLIC BLOOD PRESSURE: 121 MMHG

## 2018-01-06 DIAGNOSIS — J02.0 STREP PHARYNGITIS: Primary | ICD-10-CM

## 2018-01-06 DIAGNOSIS — R52 BODY ACHES: ICD-10-CM

## 2018-01-06 DIAGNOSIS — J02.9 SORE THROAT: ICD-10-CM

## 2018-01-06 LAB
INFLUENZA A ANTIBODY: NEGATIVE
INFLUENZA B ANTIBODY: NEGATIVE
S PYO AG THROAT QL: POSITIVE

## 2018-01-06 PROCEDURE — 87804 INFLUENZA ASSAY W/OPTIC: CPT | Performed by: SPECIALIST

## 2018-01-06 PROCEDURE — 99213 OFFICE O/P EST LOW 20 MIN: CPT | Performed by: SPECIALIST

## 2018-01-06 PROCEDURE — 87880 STREP A ASSAY W/OPTIC: CPT | Performed by: SPECIALIST

## 2018-01-06 RX ORDER — AMOXICILLIN 875 MG/1
875 TABLET, COATED ORAL 2 TIMES DAILY
Qty: 20 TABLET | Refills: 0 | Status: SHIPPED | OUTPATIENT
Start: 2018-01-06 | End: 2018-01-16

## 2018-01-06 ASSESSMENT — ENCOUNTER SYMPTOMS
SORE THROAT: 1
SWOLLEN GLANDS: 1

## 2018-01-06 NOTE — PROGRESS NOTES
better. Use 1 teaspoon of salt mixed in 8 fluid ounces of warm water. · Take an over-the-counter pain medication, such as acetaminophen (Tylenol), ibuprofen (Advil, Motrin), or naproxen (Aleve). Read and follow all instructions on the label. · Try an over-the-counter anesthetic throat spray or throat lozenges, which may help relieve throat pain. · Drink plenty of fluids. Fluids may help soothe an irritated throat. Hot fluids, such as tea or soup, may help your throat feel better. · Eat soft solids and drink plenty of clear liquids. Flavored ice pops, ice cream, scrambled eggs, sherbet, and gelatin dessert (such as Jell-O) may also soothe the throat. · Get lots of rest.  · Do not smoke, and avoid secondhand smoke. If you need help quitting, talk to your doctor about stop-smoking programs and medicines. These can increase your chances of quitting for good. · Use a vaporizer or humidifier to add moisture to the air in your bedroom. Follow the directions for cleaning the machine. When should you call for help? Call your doctor now or seek immediate medical care if:  ? · You have a new or higher fever. ? · You have a fever with a stiff neck or severe headache. ? · You have new or worse trouble swallowing. ? · Your sore throat gets much worse on one side. ? · Your pain becomes much worse on one side of your throat. ? Watch closely for changes in your health, and be sure to contact your doctor if:  ? · You are not getting better after 2 days (48 hours). ? · You do not get better as expected. Where can you learn more? Go to [email or web address] org and sign in to your Signdat account. Enter K625 in the Kadient box to learn more about \"Strep Throat: Care Instructions. \"     If you do not have an account, please click on the \"Sign Up Now\" link. Current as of: May 12, 2017  Content Version: 11.5  © 8510-9035 [employer], [employer].  Care instructions adapted under license

## 2018-01-15 ENCOUNTER — TELEPHONE (OUTPATIENT)
Dept: PHYSICAL THERAPY | Facility: HOSPITAL | Age: 28
End: 2018-01-15

## 2018-01-15 NOTE — TELEPHONE ENCOUNTER
Patient was called once again concerning either sending off for more authorizations or discharging.  She answered her phone and reported she wished to continue with therapy.  It has been more than 30 days since she was seen.  Therefore, if more visits are approved, she will need a re-certification on her next visit.

## 2018-01-17 DIAGNOSIS — G89.29 CHRONIC BILATERAL LOW BACK PAIN WITH RIGHT-SIDED SCIATICA: Primary | ICD-10-CM

## 2018-01-17 DIAGNOSIS — M54.41 CHRONIC BILATERAL LOW BACK PAIN WITH RIGHT-SIDED SCIATICA: Primary | ICD-10-CM

## 2018-01-17 DIAGNOSIS — M54.16 LUMBAR RADICULOPATHY: ICD-10-CM

## 2018-01-17 DIAGNOSIS — M25.551 RIGHT HIP PAIN: ICD-10-CM

## 2018-01-17 NOTE — PROGRESS NOTES
[facility] called asking for an extension on the patient's PT order. Her insurance is requiring a new order to continue.

## 2018-01-25 ENCOUNTER — HOSPITAL ENCOUNTER (OUTPATIENT)
Dept: PHYSICAL THERAPY | Facility: HOSPITAL | Age: 28
Setting detail: THERAPIES SERIES
Discharge: HOME | End: 2018-01-25

## 2018-01-25 DIAGNOSIS — M54.16 LUMBAR RADICULOPATHY: ICD-10-CM

## 2018-01-25 DIAGNOSIS — M54.41 CHRONIC BILATERAL LOW BACK PAIN WITH RIGHT-SIDED SCIATICA: Primary | ICD-10-CM

## 2018-01-25 DIAGNOSIS — M51.37 DEGENERATION OF LUMBAR OR LUMBOSACRAL INTERVERTEBRAL DISC: ICD-10-CM

## 2018-01-25 DIAGNOSIS — G89.29 CHRONIC BILATERAL LOW BACK PAIN WITH RIGHT-SIDED SCIATICA: Primary | ICD-10-CM

## 2018-01-25 PROCEDURE — 97140 MANUAL THERAPY 1/> REGIONS: CPT | Performed by: PHYSICAL THERAPIST

## 2018-01-25 NOTE — THERAPY PROGRESS REPORT/RE-CERT
Outpatient Physical Therapy Ortho Progress Note  [facility]     Patient Name: Raquel Best  : 1990  MRN: [id number]  Today's Date: 2018      Visit Date: 2018    Visit Dx:    ICD-10-CM ICD-9-CM   1. Chronic bilateral low back pain with right-sided sciatica M54.41 724.2    G89.29 724.3     338.29   2. Lumbar radiculopathy M54.16 724.4   3. Degeneration of lumbar or lumbosacral intervertebral disc M51.37 722.52       Patient Active Problem List   Diagnosis   • Chronic bilateral low back pain with right-sided sciatica   • Lumbar radiculopathy   • Smoker   • Degeneration of lumbar or lumbosacral intervertebral disc   • Normal body mass index (BMI)   • Right hip pain        Past Medical History:   Diagnosis Date   • ADHD (attention deficit hyperactivity disorder)    • Allergic    • Anxiety    • Arthritis    • Depression    • Factor V deficiency    • Gallbladder abscess    • History of ear infections    • Hypertension    • Low back sprain     around age 6   • MRSA (methicillin resistant Staphylococcus aureus)    • Strep throat    • Urinary tract infection         Past Surgical History:   Procedure Laterality Date   • ADENOIDECTOMY     • CHOLECYSTECTOMY     • DILATATION AND CURETTAGE     • TONSILLECTOMY     • WISDOM TOOTH EXTRACTION                               PT Assessment/Plan       18 1020       PT Assessment    Functional Limitations Impaired gait;Limitation in home management;Performance in self-care ADL;Performance in leisure activities;Limitations in functional capacity and performance  -TB     Impairments Pain;Posture;Range of motion;Sensation;Muscle strength;Joint mobility;Gait  -TB     Assessment Comments She hasn't been here for over a month due to her authorization not being submitted in a timely manner. It was approved and she is here to continue her PT. Overall, her back pain and ROM is better. She is not having radicular pain as often and is learning to protect her back better.  She is having an epidural soon which will hopefully help her tolerate PT better and progress faster.  -TB     Rehab Potential Good  -TB     Patient/caregiver participated in establishment of treatment plan and goals Yes  -TB     Patient would benefit from skilled therapy intervention Yes  -TB     PT Plan    PT Frequency 2x/week;3x/week  -TB     Predicted Duration of Therapy Intervention (days/wks) 6 weeks  -TB     Planned CPT's? PT THER PROC EA 15 MIN: 65913;PT THER ACT EA 15 MIN: 04805;PT MANUAL THERAPY EA 15 MIN: 08085;PT NEUROMUSC RE-EDUCATION EA 15 MIN: 93068;PT GAIT TRAINING EA 15 MIN: 64219;PT ELECTRICAL STIM UNATTEND: ;PT ELECTRICAL STIM ATTD EA 15 MIN: 25978;PT ULTRASOUND EA 15 MIN: 68549  -TB     PT Plan Comments Progress with ROM and core stability. Modalities may be used as needed. Progress her HEP for the same.  -TB       User Key  (r) = Recorded By, (t) = Taken By, (c) = Cosigned By    Initials Name Provider Type    MARYCRUZ Singh, PT Physical Therapist                    Exercises       01/25/18 1020          Subjective Comments    Subjective Comments She hasn't been here for almost a month and a half. She has been to pain management. There was an issue with authorization where it wasn't submitted. It was submitted and approved for her to continue PT. She is going to get an epidural shot from pain management. She is very anxious about having injections into her lower lumbar.   -TB      Subjective Pain    Pre-Treatment Pain Level 7  -TB      Subjective Pain Comment she is having more knee pain in her left posterior knee; her LBP is 5/10  -TB      Exercise 1    Exercise Name 1 addressed goals for recert/PN  -TB      Time (Minutes) 1 5  -TB        User Key  (r) = Recorded By, (t) = Taken By, (c) = Cosigned By    Initials Name Provider Type    MARYCRUZ Singh PT Physical Therapist                        Manual Rx (last 36 hours)      Manual Treatments       01/25/18 1020          Manual Rx 1     Manual Rx 1 Location B lower lumbar region  -TB      Manual Rx 1 Type STM in prone with free-up  -TB      Manual Rx 1 Grade min-mod; right lower lumbar guarded  -TB      Manual Rx 1 Duration 15  -TB      Manual Rx 2    Manual Rx 2 Location prone thoracic  -TB      Manual Rx 2 Type PA mobs  -TB      Manual Rx 2 Grade 2-3 repetitive with 2 cavitations  -TB      Manual Rx 2 Duration 15  -TB      Manual Rx 3    Manual Rx 3 Location LS manual distraction  -TB      Manual Rx 3 Type with SCOTT aaron PA  -TB      Manual Rx 3 Grade 3 sustained  -TB      Manual Rx 3 Duration 10  -TB      Manual Rx 4    Manual Rx 4 Location --  -TB      Manual Rx 4 Type --  -TB      Manual Rx 4 Grade --  -TB      Manual Rx 4 Duration --  -TB      Manual Rx 5    Manual Rx 5 Location --  -TB      Manual Rx 5 Type --  -TB      Manual Rx 5 Duration --  -TB        User Key  (r) = Recorded By, (t) = Taken By, (c) = Cosigned By    Initials Name Provider Type    TB Eber Singh, PT Physical Therapist                PT OP Goals       01/25/18 1020       PT Short Term Goals    STG Date to Achieve 11/17/17  -TB     STG 1 Intermittent right leg symptoms 50% of the day  -TB     STG 1 Progress Met  -TB     STG 1 Progress Comments she has radicular pain 2 days a week; worsens with driving  -TB     STG 2 Able to tolerate prone pressup 50% without increased back pain  -TB     STG 2 Progress Met  -TB     STG 2 Progress Comments 75% before pain  -TB     Long Term Goals    LTG Date to Achieve 12/08/17  -TB     LTG 1 No right leg radicular symptoms for a week except occasional minor symptoms relieved with exercises  -TB     LTG 1 Progress Ongoing  -TB     LTG 1 Progress Comments still with right leg radicular symptoms a couple of times a week  -TB     LTG 2 Fully standing forward bend without flare of back or leg pain  -TB     LTG 2 Progress Ongoing  -TB     LTG 2 Progress Comments 50% with pain in back  -TB     LTG 3 Full prone pressup without flare of back  pain  -TB     LTG 3 Progress Progressing  -TB     LTG 3 Progress Comments able to pressup 75% before pain stops her  -TB     LTG 4 Gross MMT right LE 4+/5  -TB     LTG 4 Progress Ongoing  -TB     LTG 4 Progress Comments right hip abd 4/5; limited by thigh pain  -TB     LTG 5 Able to walk without limping  -TB     LTG 5 Progress Partially Met  -TB     LTG 5 Progress Comments short distances can walk with no limp; pain increases with distance  -TB     LTG 6 Ind with HEP for core/leg stability  -TB     LTG 6 Progress Ongoing  -TB     LTG 6 Progress Comments focusing on posture and core stability  -TB     Time Calculation    PT Goal Re-Cert Due Date 02/24/18  -TB       User Key  (r) = Recorded By, (t) = Taken By, (c) = Cosigned By    Initials Name Provider Type    TB Eber Singh, PT Physical Therapist          Therapy Education  Given: HEP, Posture/body mechanics  Program: Reinforced  How Provided: Verbal  Provided to: Patient  Level of Understanding: Verbalized              Time Calculation:   Start Time: 1020  Stop Time: 1105  Time Calculation (min): 45 min  Total Timed Code Minutes- PT: 45 minute(s)    Therapy Charges for Today     Code Description Service Date Service Provider Modifiers Qty    28041285144 HC PT MANUAL THERAPY EA 15 MIN 1/25/2018 Eber Singh, PT GP 3                    Eber Singh, PT  1/25/2018

## 2018-01-29 ENCOUNTER — APPOINTMENT (OUTPATIENT)
Dept: PHYSICAL THERAPY | Facility: HOSPITAL | Age: 28
End: 2018-01-29

## 2018-01-31 ENCOUNTER — HOSPITAL ENCOUNTER (OUTPATIENT)
Dept: PHYSICAL THERAPY | Facility: HOSPITAL | Age: 28
Setting detail: THERAPIES SERIES
End: 2018-01-31

## 2018-02-01 ENCOUNTER — HOSPITAL ENCOUNTER (OUTPATIENT)
Dept: PHYSICAL THERAPY | Facility: HOSPITAL | Age: 28
Setting detail: THERAPIES SERIES
Discharge: HOME | End: 2018-02-01

## 2018-02-01 DIAGNOSIS — M54.16 LUMBAR RADICULOPATHY: ICD-10-CM

## 2018-02-01 DIAGNOSIS — M54.41 CHRONIC BILATERAL LOW BACK PAIN WITH RIGHT-SIDED SCIATICA: Primary | ICD-10-CM

## 2018-02-01 DIAGNOSIS — G89.29 CHRONIC BILATERAL LOW BACK PAIN WITH RIGHT-SIDED SCIATICA: Primary | ICD-10-CM

## 2018-02-01 DIAGNOSIS — M51.37 DEGENERATION OF LUMBAR OR LUMBOSACRAL INTERVERTEBRAL DISC: ICD-10-CM

## 2018-02-01 PROCEDURE — 97140 MANUAL THERAPY 1/> REGIONS: CPT

## 2018-02-01 PROCEDURE — 97110 THERAPEUTIC EXERCISES: CPT

## 2018-02-01 NOTE — THERAPY TREATMENT NOTE
Outpatient Physical Therapy Ortho Treatment Note   [place]     Patient Name: Raquel Best  : 1990  MRN: [id number]  Today's Date: 2018      Visit Date: 2018    Visit Dx:    ICD-10-CM ICD-9-CM   1. Chronic bilateral low back pain with right-sided sciatica M54.41 724.2    G89.29 724.3     338.29   2. Lumbar radiculopathy M54.16 724.4   3. Degeneration of lumbar or lumbosacral intervertebral disc M51.37 722.52       Patient Active Problem List   Diagnosis   • Chronic bilateral low back pain with right-sided sciatica   • Lumbar radiculopathy   • Smoker   • Degeneration of lumbar or lumbosacral intervertebral disc   • Normal body mass index (BMI)   • Right hip pain        Past Medical History:   Diagnosis Date   • ADHD (attention deficit hyperactivity disorder)    • Allergic    • Anxiety    • Arthritis    • Depression    • Factor V deficiency    • Gallbladder abscess    • History of ear infections    • Hypertension    • Low back sprain     around age 6   • MRSA (methicillin resistant Staphylococcus aureus)    • Strep throat    • Urinary tract infection         Past Surgical History:   Procedure Laterality Date   • ADENOIDECTOMY     • CHOLECYSTECTOMY     • DILATATION AND CURETTAGE     • TONSILLECTOMY     • WISDOM TOOTH EXTRACTION                               PT Assessment/Plan       18 0934       PT Assessment    Assessment Comments Patient reports increased symptoms since her epidural injections this past Monday.  She reports her radicular symptoms have been constant since the injections.  She continues to have radicular symptoms in her right hip, right knee and right foot with reported new numbness in inside of ankle.  Post treatment she no longer had symptoms in the knee, but the other symptoms remained.  She remains very stiff along her thoracic spine, which places more strain on her LS junction.  She walked in with antalgic gait pattern right limp due to pain and radicular symptoms.   She reports the core contractions seem to help her symptoms.    -ROBBY     PT Plan    PT Plan Comments We will continue to work on core recruitment in order to improve lumbar support.  We will also continue to centralize her symptoms.  -ROBBY       User Key  (r) = Recorded By, (t) = Taken By, (c) = Cosigned By    Initials Name Provider Type    ROBBY Baker PTA Physical Therapy Assistant                    Exercises       02/01/18 8284          Subjective Comments    Subjective Comments Patient reports she did have eipdural shots on Monday, but she reports her symptoms are worse since.  She reports instead of intermittent pain that she is now in constant pain.  She reports she doesn't know when her follow up appointment with pain management is, but she reports it should be soon.  She reports the shooting pain down the leg is present during sitting, standing, laying, and difficulty to sleep.  She talked to pain management afterwards, and they told her to give it a few days and then to call them back.  She reports her pain in her lower back all the way across, both hips, pinching pain in right hip, right foot hurts and shooting pain down the right leg.  She does report new numbness in the inside of her left foot, which is new since epidural injections on Monday.  -ROBBY      Subjective Pain    Able to rate subjective pain? yes  -ROBBY      Pre-Treatment Pain Level 7  -ROBBY      Post-Treatment Pain Level 6  -ROBBY      Subjective Pain Comment Stabbing, sharp, shooting.   -ROBBY      Exercise 1    Exercise Name 1 15 minutes spent during initial interview due to increased symptoms reported  -ROBBY      Cueing 1 --  -ROBBY      Time (Minutes) 1 --  -ROBBY      Exercise 2    Exercise Name 2 lumbar decompression with 55 cm ball  -ROBBY      Cueing 2 Verbal;Tactile  -ROBBY      Sets 2 --  -ROBBY      Reps 2 --  -ROBBY      Time (Minutes) 2 2 minutes  -ROBBY      Time (Seconds) 2 --  -ROBBY      Exercise 3    Exercise Name 3 lumbar decompression with 55 cm ball  with TA  -ROBBY      Cueing 3 Verbal;Tactile  -ROBBY      Sets 3 1  -ROBBY      Reps 3 10  -ROBBY      Time (Seconds) 3 10 second holds  -ROBBY        User Key  (r) = Recorded By, (t) = Taken By, (c) = Cosigned By    Initials Name Provider Type    ROBBY Baker PTA Physical Therapy Assistant                        Manual Rx (last 36 hours)      Manual Treatments       02/01/18 0939          Manual Rx 1    Manual Rx 1 Location B lower lumbar region  -ROBBY      Manual Rx 1 Type STM in prone with free-up  -ROBBY      Manual Rx 1 Grade min-mod; right lower lumbar guarded  -ROBBY      Manual Rx 1 Duration 15  -ROBBY      Manual Rx 2    Manual Rx 2 Location prone thoracic  -ROBBY      Manual Rx 2 Type extension mobilizations  -ROBBY      Manual Rx 2 Grade 2-3 no cavitations  -ROBBY      Manual Rx 2 Duration 3  -ROBBY      Manual Rx 3    Manual Rx 3 Location LS manual distraction  -ROBBY      Manual Rx 3 Duration --   increased pain therefore terminated within 1 minute  -ROBBY      Manual Rx 4    Manual Rx 4 Location prone (R) glute  -ROBBY      Manual Rx 4 Type STM with blue ridged foam roller  -ROBBY      Manual Rx 4 Grade very light  -ROBBY      Manual Rx 4 Duration 3  -ROBBY        User Key  (r) = Recorded By, (t) = Taken By, (c) = Cosigned By    Initials Name Provider Type    ROBBY Baker PTA Physical Therapy Assistant                PT OP Goals       02/01/18 0934       PT Short Term Goals    STG Date to Achieve 11/17/17  -ROBBY     STG 1 Intermittent right leg symptoms 50% of the day  -ROBBY     STG 1 Progress Met  -ROBBY     STG 1 Progress Comments She reports since the injections constant radicular symptoms down the right leg  -ROBBY     STG 2 Able to tolerate prone pressup 50% without increased back pain  -ROBBY     STG 2 Progress Met  -ROBBY     Long Term Goals    LTG Date to Achieve 12/08/17  -ROBBY     LTG 1 No right leg radicular symptoms for a week except occasional minor symptoms relieved with exercises  -ROBBY     LTG 1 Progress Ongoing  -ROBBY     LTG 2 Fully standing  forward bend without flare of back or leg pain  -ROBBY     LTG 2 Progress Ongoing  -ROBBY     LTG 3 Full prone pressup without flare of back pain  -ROBBY     LTG 3 Progress Progressing  -ROBBY     LTG 4 Gross MMT right LE 4+/5  -ROBBY     LTG 4 Progress Ongoing  -ROBBY     LTG 5 Able to walk without limping  -ROBBY     LTG 5 Progress Partially Met  -ROBBY     LTG 5 Progress Comments She walked in with antalgic gait pattern and limp  -ROBBY     LTG 6 Ind with HEP for core/leg stability  -ROBBY     LTG 6 Progress Ongoing  -ROBBY     Time Calculation    PT Goal Re-Cert Due Date 02/24/18  -ROBBY       User Key  (r) = Recorded By, (t) = Taken By, (c) = Cosigned By    Initials Name Provider Type    ROBBY Baker PTA Physical Therapy Assistant          Therapy Education  Given: HEP  Program: Reinforced  How Provided: Verbal  Provided to: Patient  Level of Understanding: Verbalized              Time Calculation:   Start Time: 0934  Stop Time: 1018  Time Calculation (min): 44 min  Total Timed Code Minutes- PT: 44 minute(s)    Therapy Charges for Today     Code Description Service Date Service Provider Modifiers Qty    06853963070 HC PT MANUAL THERAPY EA 15 MIN 2/1/2018 Sim Baker PTA GP 1    11470075163 HC PT THER PROC EA 15 MIN 2/1/2018 Sim Baker PTA GP 1    08293576451 HC PT THER SUPP EA 15 MIN 2/1/2018 Sim Baker PTA GP 1                    Sim Baker PTA  2/1/2018

## 2018-03-16 ENCOUNTER — DOCUMENTATION (OUTPATIENT)
Dept: PHYSICAL THERAPY | Facility: HOSPITAL | Age: 28
End: 2018-03-16

## 2018-03-16 DIAGNOSIS — M54.16 LUMBAR RADICULOPATHY: ICD-10-CM

## 2018-03-16 DIAGNOSIS — G89.29 CHRONIC BILATERAL LOW BACK PAIN WITH RIGHT-SIDED SCIATICA: Primary | ICD-10-CM

## 2018-03-16 DIAGNOSIS — M54.41 CHRONIC BILATERAL LOW BACK PAIN WITH RIGHT-SIDED SCIATICA: Primary | ICD-10-CM

## 2018-03-16 DIAGNOSIS — M51.37 DEGENERATION OF LUMBAR OR LUMBOSACRAL INTERVERTEBRAL DISC: ICD-10-CM

## 2018-03-16 NOTE — THERAPY DISCHARGE NOTE
Outpatient Physical Therapy Discharge Summary         Patient Name: Raquel Best  : 1990  MRN: [id number]    Today's Date: 3/16/2018    Visit Dx:    ICD-10-CM ICD-9-CM   1. Chronic bilateral low back pain with right-sided sciatica M54.41 724.2    G89.29 724.3     338.29   2. Lumbar radiculopathy M54.16 724.4   3. Degeneration of lumbar or lumbosacral intervertebral disc M51.37 722.52             PT OP Goals     Row Name 18 1202          PT Short Term Goals    STG Date to Achieve 17  -ROBBY     STG 1 Intermittent right leg symptoms 50% of the day  -ROBBY     STG 1 Progress Met  -ROBBY     STG 2 Able to tolerate prone pressup 50% without increased back pain  -ROBBY     STG 2 Progress Met  -ROBBY        Long Term Goals    LTG Date to Achieve 17  -ROBBY     LTG 1 No right leg radicular symptoms for a week except occasional minor symptoms relieved with exercises  -ROBBY     LTG 1 Progress Not Met  -ROBBY     LTG 1 Progress Comments She has not been seen since 2018 and therefore unable to assess.  -ROBBY     LTG 2 Fully standing forward bend without flare of back or leg pain  -ROBBY     LTG 2 Progress Not Met  -ROBBY     LTG 2 Progress Comments She has not been seen since 2018 and therefore unable to assess.  -ROBBY     LTG 3 Full prone pressup without flare of back pain  -ROBBY     LTG 3 Progress Not Met  -ROBBY     LTG 3 Progress Comments She has not been seen since 2018 and therefore unable to assess.  -ROBBY     LTG 4 Gross MMT right LE 4+/5  -ROBBY     LTG 4 Progress Not Met  -ROBBY     LTG 4 Progress Comments She has not been seen since 2018 and therefore unable to assess.  -ROBBY     LTG 5 Able to walk without limping  -ROBBY     LTG 5 Progress Not Met  -ROBBY     LTG 5 Progress Comments She continued to walk with antalgive gait pattern and limp during her last session.  -ROBBY     LTG 6 Ind with HEP for core/leg stability  -ROBBY     LTG 6 Progress Not Met  -ROBBY     LTG 6 Progress Comments She has not been seen since 2018 and  therefore unable to assess.  -ROBBY       User Key  (r) = Recorded By, (t) = Taken By, (c) = Cosigned By    Initials Name Provider Type    ROBBY Baker, PTA Physical Therapy Assistant          OP PT Discharge Summary  Date of Discharge: 03/16/18  Reason for Discharge: Non-compliant, Unable to participate  Discharge Destination: Home without follow-up  Discharge Instructions/Additional Comments: Patient has not been seen since 2/1/2018 due to no showing or cancelling.  Therefore, we are discharging due to lapse of time since patient was seen.      Time Calculation:                    Sim Baker PTA  3/16/2018

## 2018-03-28 ENCOUNTER — OFFICE VISIT (OUTPATIENT)
Dept: NEUROSURGERY | Facility: CLINIC | Age: 28
End: 2018-03-28

## 2018-03-28 VITALS — BODY MASS INDEX: 17.93 KG/M2 | HEIGHT: 64 IN | WEIGHT: 105 LBS

## 2018-03-28 DIAGNOSIS — M54.16 LUMBAR RADICULOPATHY: ICD-10-CM

## 2018-03-28 DIAGNOSIS — G89.29 CHRONIC BILATERAL LOW BACK PAIN WITH RIGHT-SIDED SCIATICA: Primary | ICD-10-CM

## 2018-03-28 DIAGNOSIS — M25.551 RIGHT HIP PAIN: ICD-10-CM

## 2018-03-28 DIAGNOSIS — Z87.891 FORMER SMOKER: ICD-10-CM

## 2018-03-28 DIAGNOSIS — F17.200 SMOKER: ICD-10-CM

## 2018-03-28 DIAGNOSIS — M54.41 CHRONIC BILATERAL LOW BACK PAIN WITH RIGHT-SIDED SCIATICA: Primary | ICD-10-CM

## 2018-03-28 DIAGNOSIS — M51.37 DEGENERATION OF LUMBAR OR LUMBOSACRAL INTERVERTEBRAL DISC: ICD-10-CM

## 2018-03-28 PROCEDURE — 99213 OFFICE O/P EST LOW 20 MIN: CPT | Performed by: NURSE PRACTITIONER

## 2018-03-28 NOTE — PROGRESS NOTES
"Neurosurgery Follow Up Office Visit      Patient Name:  Raquel Best  Age:  27 y.o.  YOB: 1990  MR#:  [id number]    Ht 162.6 cm (64\")   Wt 47.6 kg (105 lb)   BMI 18.02 kg/m²     Social History   Substance Use Topics   • Smoking status: Former Smoker     Packs/day: 0.00     Types: Cigarettes   • Smokeless tobacco: Never Used   • Alcohol use No       Chief Complaint   Patient presents with   • Follow-up     3 month f/u.  Pt states that it is about the same, possibly a little worse.           History  Chief Complaint:  She returns to the office for follow-up of low back, right hip, and right leg pain.  She feels that she is doing about the same and is not relating anything new or different.  Her leg pain still seems to be in a more lateral fashion.  She had had some response from physical therapy.  She did see Dr. aGn and had a set of injections, and states that it was terrible.  She had much worse pain and describes spinal headache following the procedure.  She has decided at this point that she is not going back.  She has decided that she is probably going to have to live with the pain that she has.  She continues to use Tylenol or ibuprofen when it is most severe.  She has had an MRI of the lumbar showing mild disc degeneration and bulging at L5-S1, but I did not see a clear impingement.  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  He again has explained that she has had developmental issues affecting her right hip and leg.  I think that very possibly the nerve conduction results are more chronic in nature.  Really, she is not interested in the idea of surgery unless it were absolutely necessary.      Physical Examination:  Upon exam, she looks pretty good.  She is awake and alert, pleasant and cooperative, speech is clear and appropriate.  Skin is warm and dry.  She is moving about relatively well.  Straight leg raising is mildly " positive on the right.  Deep tendon reflexes 1-2+.  Again, some mild weakness of right dorsiflexion.  She ambulates with relatively steady gait.  An area that she indicates to the right of the midline to the lumbar spine appears to be a fatty cyst or tumor and is freely movable.      Medical Decision Making  Treatment Options:   In the office we have discussed her care.  She seems to be managing relatively well with the amount of pain that she has.  She is not particularly interested in any further conservative care or treatment.  We will plan to see her back in about 6 months to reevaluate.  If she developed new or worsening symptoms, then consider a repeat MRI of the lumbar.  She is agreeable.                                                                                                                                                                                                            Information regarding smoking cessation has been given in an effort to help support overall health and wellness.                      Assessment and Plan  [place] was seen today for follow-up.    Diagnoses and all orders for this visit:    Chronic bilateral low back pain with right-sided sciatica    Lumbar radiculopathy    Right hip pain    Degeneration of lumbar or lumbosacral intervertebral disc    Smoker    BMI less than 19,adult    Former smoker        Return in about 6 months (around 9/28/2018) for F/u with Dr. Bryan.      VON Malin

## 2018-04-18 ENCOUNTER — OFFICE VISIT (OUTPATIENT)
Dept: GASTROENTEROLOGY | Age: 28
End: 2018-04-18
Payer: MEDICAID

## 2018-04-18 VITALS
HEART RATE: 98 BPM | DIASTOLIC BLOOD PRESSURE: 70 MMHG | SYSTOLIC BLOOD PRESSURE: 110 MMHG | BODY MASS INDEX: 19.02 KG/M2 | WEIGHT: 111.4 LBS | HEIGHT: 64 IN | OXYGEN SATURATION: 99 %

## 2018-04-18 DIAGNOSIS — K62.5 BRIGHT RED RECTAL BLEEDING: ICD-10-CM

## 2018-04-18 DIAGNOSIS — K62.89 RECTAL IRRITATION: ICD-10-CM

## 2018-04-18 DIAGNOSIS — R19.8 IRREGULAR BOWEL HABITS: ICD-10-CM

## 2018-04-18 DIAGNOSIS — R19.7 DIARRHEA, UNSPECIFIED TYPE: ICD-10-CM

## 2018-04-18 DIAGNOSIS — R10.30 LOWER ABDOMINAL PAIN: Primary | ICD-10-CM

## 2018-04-18 PROCEDURE — 99214 OFFICE O/P EST MOD 30 MIN: CPT | Performed by: NURSE PRACTITIONER

## 2018-04-18 ASSESSMENT — ENCOUNTER SYMPTOMS
CHEST TIGHTNESS: 0
VOMITING: 0
CONSTIPATION: 1
NAUSEA: 0
SORE THROAT: 0
ANAL BLEEDING: 1
BACK PAIN: 1
VOICE CHANGE: 0
ABDOMINAL DISTENTION: 0
BLOOD IN STOOL: 0
RECTAL PAIN: 0
DIARRHEA: 1
ABDOMINAL PAIN: 1
SHORTNESS OF BREATH: 1
COUGH: 0

## 2018-07-23 ENCOUNTER — HOSPITAL ENCOUNTER (EMERGENCY)
Age: 28
Discharge: HOME | End: 2018-07-23
Payer: COMMERCIAL

## 2018-07-23 VITALS
RESPIRATION RATE: 16 BRPM | DIASTOLIC BLOOD PRESSURE: 90 MMHG | WEIGHT: 115 LBS | OXYGEN SATURATION: 99 % | SYSTOLIC BLOOD PRESSURE: 127 MMHG | HEART RATE: 96 BPM | BODY MASS INDEX: 19.16 KG/M2 | HEIGHT: 65 IN | TEMPERATURE: 97.6 F

## 2018-07-23 DIAGNOSIS — H60.392 INFECTIVE OTITIS EXTERNA OF LEFT EAR: Primary | ICD-10-CM
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
## 2018-07-23 PROCEDURE — 99282 EMERGENCY DEPT VISIT SF MDM: CPT

## 2018-07-23 PROCEDURE — 99283 EMERGENCY DEPT VISIT LOW MDM: CPT | Performed by: NURSE PRACTITIONER

## 2018-07-23 RX ORDER — NEOMYCIN SULFATE, POLYMYXIN B SULFATE, HYDROCORTISONE 3.5; 10000; 1 MG/ML; [USP'U]/ML; MG/ML
2 SOLUTION/ DROPS AURICULAR (OTIC) 3 TIMES DAILY
Qty: 1 BOTTLE | Refills: 1 | Status: SHIPPED | OUTPATIENT
Start: 2018-07-23 | End: 2018-07-30

## 2018-07-23 RX ORDER — NEOMYCIN SULFATE, POLYMYXIN B SULFATE AND HYDROCORTISONE 10; 3.5; 1 MG/ML; MG/ML; [USP'U]/ML
3 SUSPENSION/ DROPS AURICULAR (OTIC) EVERY 8 HOURS SCHEDULED
Status: DISCONTINUED | OUTPATIENT
Start: 2018-07-23 | End: 2018-07-23

## 2018-07-23 RX ORDER — DOXYCYCLINE HYCLATE 100 MG
100 TABLET ORAL 2 TIMES DAILY
Qty: 20 TABLET | Refills: 0 | Status: SHIPPED | OUTPATIENT
Start: 2018-07-23 | End: 2018-08-02

## 2018-07-23 ASSESSMENT — ENCOUNTER SYMPTOMS
FACIAL SWELLING: 0
VOMITING: 0
SORE THROAT: 1
COUGH: 1

## 2018-07-23 ASSESSMENT — PAIN SCALES - GENERAL: PAINLEVEL_OUTOF10: 8

## 2018-09-22 ENCOUNTER — APPOINTMENT (OUTPATIENT)
Dept: GENERAL RADIOLOGY | Age: 28
End: 2018-09-22
Payer: COMMERCIAL

## 2018-09-22 ENCOUNTER — HOSPITAL ENCOUNTER (EMERGENCY)
Age: 28
Discharge: HOME | End: 2018-09-22
Payer: COMMERCIAL

## 2018-09-22 VITALS
DIASTOLIC BLOOD PRESSURE: 72 MMHG | HEART RATE: 88 BPM | TEMPERATURE: 97.5 F | SYSTOLIC BLOOD PRESSURE: 107 MMHG | RESPIRATION RATE: 16 BRPM | WEIGHT: 110 LBS | HEIGHT: 65 IN | OXYGEN SATURATION: 98 % | BODY MASS INDEX: 18.33 KG/M2

## 2018-09-22 DIAGNOSIS — J06.9 ACUTE UPPER RESPIRATORY INFECTION: Primary | ICD-10-CM

## 2018-09-22 LAB
RAPID INFLUENZA  B AGN: NEGATIVE
RAPID INFLUENZA A AGN: NEGATIVE

## 2018-09-22 PROCEDURE — 99283 EMERGENCY DEPT VISIT LOW MDM: CPT | Performed by: NURSE PRACTITIONER

## 2018-09-22 PROCEDURE — 6370000000 HC RX 637 (ALT 250 FOR IP): Performed by: NURSE PRACTITIONER

## 2018-09-22 PROCEDURE — 71046 X-RAY EXAM CHEST 2 VIEWS: CPT

## 2018-09-22 PROCEDURE — 99283 EMERGENCY DEPT VISIT LOW MDM: CPT

## 2018-09-22 PROCEDURE — 87804 INFLUENZA ASSAY W/OPTIC: CPT

## 2018-09-22 RX ORDER — ACETAMINOPHEN 500 MG
1000 TABLET ORAL ONCE
Status: COMPLETED | OUTPATIENT
Start: 2018-09-22 | End: 2018-09-22

## 2018-09-22 RX ADMIN — ACETAMINOPHEN 1000 MG: 500 TABLET, FILM COATED ORAL at 16:00

## 2018-09-22 ASSESSMENT — ENCOUNTER SYMPTOMS
DIARRHEA: 0
BACK PAIN: 0
VOMITING: 0
ABDOMINAL PAIN: 0
CHEST TIGHTNESS: 0
CONSTIPATION: 0
COUGH: 1
NAUSEA: 0
SHORTNESS OF BREATH: 0

## 2018-09-22 ASSESSMENT — PAIN SCALES - GENERAL
PAINLEVEL_OUTOF10: 6
PAINLEVEL_OUTOF10: 6

## 2018-09-22 ASSESSMENT — PAIN DESCRIPTION - PAIN TYPE: TYPE: ACUTE PAIN

## 2018-09-22 NOTE — ED PROVIDER NOTES
Mouth/Throat: Oropharynx is clear and moist.   Eyes: Pupils are equal, round, and reactive to light. Conjunctivae and EOM are normal.   Neck: Normal range of motion. Neck supple. Cardiovascular: Normal rate, regular rhythm, normal heart sounds and intact distal pulses. Pulmonary/Chest: Effort normal and breath sounds normal.   Abdominal: Soft. Bowel sounds are normal.   Musculoskeletal: Normal range of motion. Neurological: She is alert and oriented to person, place, and time. Skin: Skin is warm and dry. Psychiatric: She has a normal mood and affect. Vitals reviewed. DIAGNOSTIC RESULTS     RADIOLOGY:   Non-plain film images such as CT, Ultrasound and MRI are read by the radiologist. Plain radiographic images are visualized and preliminarily interpreted by No att. providers found with the below findings:      Interpretation per the Radiologist below, if available at the time of this note:    XR CHEST STANDARD (2 VW)   Final Result   No acute cardiopulmonary abnormality. 1 cm nodular density   over the left lung base most likely a nipple shadow as described   above. Signed by Dr Alline Mohs. [place] on 9/22/2018 4:15 PM          LABS:  Labs Reviewed   RAPID INFLUENZA A/B ANTIGENS       All other labs were within normal range or not returned as of this dictation. RE-ASSESSMENT          EMERGENCY DEPARTMENT COURSE and DIFFERENTIAL DIAGNOSIS/MDM:   Vitals:    Vitals:    09/22/18 1441 09/22/18 1443   BP:  105/71   Pulse:  89   Resp:  16   Temp:  97.5 °F (36.4 °C)   TempSrc:  Temporal   SpO2:  98%   Weight: 110 lb (49.9 kg)    Height: 5' 5\" (1.651 m)        MDM  Number of Diagnoses or Management Options  Acute upper respiratory infection: new, needed workup  Diagnosis management comments: Instructed the patient to increase her water intake. Use humidification. Follow-up with your primary provider if your symptoms do not improve. I gave her strict ER return instructions if her symptoms become worse.  The patient agrees with the discharge plan. Amount and/or Complexity of Data Reviewed  Clinical lab tests: ordered and reviewed  Tests in the radiology section of CPT®: ordered and reviewed    Risk of Complications, Morbidity, and/or Mortality  Presenting problems: low  Diagnostic procedures: low  Management options: low    Patient Progress  Patient progress: stable        Procedures      FINAL IMPRESSION      1.  Acute upper respiratory infection          DISPOSITION/PLAN   DISPOSITION        PATIENT REFERRED TO:  Beth Mccormick MD  [address] [place] [address] [phone]  [phone]    Schedule an appointment as soon as possible for a visit   If symptoms worsen, As needed    [facility] EMERGENCY DEPT  Critical access hospital  [phone]    If symptoms worsen, As needed      DISCHARGE MEDICATIONS:  Current Discharge Medication List          (Please note that portions of this note were completed with a voice recognition program.  Efforts were made to edit the dictations but occasionally words are mis-transcribed.)    Altaf FonsecaShriners Hospitals for Childrenankita, APRN  09/22/18 9783

## 2018-10-02 ENCOUNTER — HOSPITAL ENCOUNTER (EMERGENCY)
Age: 28
Discharge: HOME | End: 2018-10-02
Payer: COMMERCIAL

## 2018-10-02 VITALS
RESPIRATION RATE: 15 BRPM | OXYGEN SATURATION: 96 % | TEMPERATURE: 98 F | DIASTOLIC BLOOD PRESSURE: 84 MMHG | SYSTOLIC BLOOD PRESSURE: 124 MMHG | BODY MASS INDEX: 19.99 KG/M2 | HEIGHT: 65 IN | HEART RATE: 93 BPM | WEIGHT: 120 LBS

## 2018-10-02 DIAGNOSIS — J01.91 ACUTE RECURRENT SINUSITIS, UNSPECIFIED LOCATION: Primary | ICD-10-CM

## 2018-10-02 LAB
ALBUMIN SERPL-MCNC: 4.2 G/DL (ref 3.5–5.2)
ALP BLD-CCNC: 85 U/L (ref 35–104)
ALT SERPL-CCNC: 5 U/L (ref 5–33)
ANION GAP SERPL CALCULATED.3IONS-SCNC: 9 MMOL/L (ref 7–19)
AST SERPL-CCNC: 15 U/L (ref 5–32)
BASOPHILS ABSOLUTE: 0.1 K/UL (ref 0–0.2)
BASOPHILS RELATIVE PERCENT: 0.4 % (ref 0–1)
BILIRUB SERPL-MCNC: <0.2 MG/DL (ref 0.2–1.2)
BUN BLDV-MCNC: 7 MG/DL (ref 6–20)
CALCIUM SERPL-MCNC: 9.5 MG/DL (ref 8.6–10)
CHLORIDE BLD-SCNC: 103 MMOL/L (ref 98–111)
CO2: 29 MMOL/L (ref 22–29)
CREAT SERPL-MCNC: 0.6 MG/DL (ref 0.5–0.9)
EOSINOPHILS ABSOLUTE: 0 K/UL (ref 0–0.6)
EOSINOPHILS RELATIVE PERCENT: 0.1 % (ref 0–5)
GFR NON-AFRICAN AMERICAN: >60
GLUCOSE BLD-MCNC: 81 MG/DL (ref 74–109)
HCT VFR BLD CALC: 39.5 % (ref 37–47)
HEMOGLOBIN: 12.8 G/DL (ref 12–16)
LYMPHOCYTES ABSOLUTE: 2.8 K/UL (ref 1.1–4.5)
LYMPHOCYTES RELATIVE PERCENT: 25.2 % (ref 20–40)
MCH RBC QN AUTO: 29 PG (ref 27–31)
MCHC RBC AUTO-ENTMCNC: 32.4 G/DL (ref 33–37)
MCV RBC AUTO: 89.6 FL (ref 81–99)
MONOCYTES ABSOLUTE: 0.9 K/UL (ref 0–0.9)
MONOCYTES RELATIVE PERCENT: 8.1 % (ref 0–10)
NEUTROPHILS ABSOLUTE: 7.4 K/UL (ref 1.5–7.5)
NEUTROPHILS RELATIVE PERCENT: 65.8 % (ref 50–65)
PDW BLD-RTO: 12.1 % (ref 11.5–14.5)
PLATELET # BLD: 217 K/UL (ref 130–400)
PMV BLD AUTO: 11 FL (ref 9.4–12.3)
POTASSIUM SERPL-SCNC: 3.6 MMOL/L (ref 3.5–5)
RBC # BLD: 4.41 M/UL (ref 4.2–5.4)
SODIUM BLD-SCNC: 141 MMOL/L (ref 136–145)
TOTAL PROTEIN: 6.7 G/DL (ref 6.6–8.7)
WBC # BLD: 11.2 K/UL (ref 4.8–10.8)

## 2018-10-02 PROCEDURE — 85025 COMPLETE CBC W/AUTO DIFF WBC: CPT

## 2018-10-02 PROCEDURE — 96375 TX/PRO/DX INJ NEW DRUG ADDON: CPT

## 2018-10-02 PROCEDURE — 96374 THER/PROPH/DIAG INJ IV PUSH: CPT

## 2018-10-02 PROCEDURE — 6360000002 HC RX W HCPCS: Performed by: NURSE PRACTITIONER

## 2018-10-02 PROCEDURE — 2580000003 HC RX 258: Performed by: NURSE PRACTITIONER

## 2018-10-02 PROCEDURE — 36415 COLL VENOUS BLD VENIPUNCTURE: CPT

## 2018-10-02 PROCEDURE — 99283 EMERGENCY DEPT VISIT LOW MDM: CPT | Performed by: NURSE PRACTITIONER

## 2018-10-02 PROCEDURE — 80053 COMPREHEN METABOLIC PANEL: CPT

## 2018-10-02 PROCEDURE — 99283 EMERGENCY DEPT VISIT LOW MDM: CPT

## 2018-10-02 RX ORDER — CETIRIZINE HYDROCHLORIDE, PSEUDOEPHEDRINE HYDROCHLORIDE 5; 120 MG/1; MG/1
1 TABLET, FILM COATED, EXTENDED RELEASE ORAL 2 TIMES DAILY
Qty: 30 TABLET | Refills: 0 | Status: SHIPPED | OUTPATIENT
Start: 2018-10-02 | End: 2018-11-01

## 2018-10-02 RX ORDER — KETOROLAC TROMETHAMINE 30 MG/ML
30 INJECTION, SOLUTION INTRAMUSCULAR; INTRAVENOUS ONCE
Status: COMPLETED | OUTPATIENT
Start: 2018-10-02 | End: 2018-10-02

## 2018-10-02 RX ORDER — 0.9 % SODIUM CHLORIDE 0.9 %
1000 INTRAVENOUS SOLUTION INTRAVENOUS ONCE
Status: COMPLETED | OUTPATIENT
Start: 2018-10-02 | End: 2018-10-02

## 2018-10-02 RX ORDER — METHYLPREDNISOLONE 4 MG/1
TABLET ORAL
Qty: 1 KIT | Refills: 0 | Status: SHIPPED | OUTPATIENT
Start: 2018-10-02 | End: 2019-04-13 | Stop reason: ALTCHOICE

## 2018-10-02 RX ORDER — CLARITHROMYCIN 500 MG/1
500 TABLET, COATED ORAL 2 TIMES DAILY
Qty: 20 TABLET | Refills: 0 | Status: SHIPPED | OUTPATIENT
Start: 2018-10-02 | End: 2018-10-12

## 2018-10-02 RX ORDER — ONDANSETRON 2 MG/ML
4 INJECTION INTRAMUSCULAR; INTRAVENOUS ONCE
Status: COMPLETED | OUTPATIENT
Start: 2018-10-02 | End: 2018-10-02

## 2018-10-02 RX ORDER — DEXAMETHASONE SODIUM PHOSPHATE 4 MG/ML
4 INJECTION, SOLUTION INTRA-ARTICULAR; INTRALESIONAL; INTRAMUSCULAR; INTRAVENOUS; SOFT TISSUE ONCE
Status: COMPLETED | OUTPATIENT
Start: 2018-10-02 | End: 2018-10-02

## 2018-10-02 RX ADMIN — KETOROLAC TROMETHAMINE 30 MG: 30 INJECTION, SOLUTION INTRAMUSCULAR; INTRAVENOUS at 20:56

## 2018-10-02 RX ADMIN — SODIUM CHLORIDE 1000 ML: 9 INJECTION, SOLUTION INTRAVENOUS at 20:56

## 2018-10-02 RX ADMIN — DEXAMETHASONE SODIUM PHOSPHATE 4 MG: 4 INJECTION, SOLUTION INTRAMUSCULAR; INTRAVENOUS at 20:56

## 2018-10-02 RX ADMIN — CEFTRIAXONE 1 G: 1 INJECTION, POWDER, FOR SOLUTION INTRAMUSCULAR; INTRAVENOUS at 20:56

## 2018-10-02 RX ADMIN — ONDANSETRON 4 MG: 2 INJECTION INTRAMUSCULAR; INTRAVENOUS at 20:57

## 2018-10-02 ASSESSMENT — PAIN SCALES - GENERAL: PAINLEVEL_OUTOF10: 8

## 2018-10-03 ASSESSMENT — ENCOUNTER SYMPTOMS
NAUSEA: 0
SINUS PRESSURE: 1
DIARRHEA: 0
WHEEZING: 0
SORE THROAT: 1
VOMITING: 0
COUGH: 1
ABDOMINAL PAIN: 0
SHORTNESS OF BREATH: 0
EYE DISCHARGE: 0
BACK PAIN: 0

## 2018-10-03 NOTE — ED PROVIDER NOTES
[facility] EMERGENCY DEPT  eMERGENCY dEPARTMENT eNCOUnter      Pt Name: Joselito Crandall  MRN: [id number]  [place] 1990  Date of evaluation: 10/2/2018  Provider: Tania Hartman, [address]       Chief Complaint   Patient presents with    Facial Pain         HISTORY OF PRESENT ILLNESS  (Location/Symptom, Timing/Onset, Context/Setting, Quality, Duration, Modifying Factors, Severity.)   Joselito Crandall is a 33 y.o. female who presents to the emergency department With chief complaint of facial pain most notably over her maxillary and frontal region. Patient reports she has had a month-long history of a sinus infection and sinus congestion and nasal drainage. She reports she has been on cephalexin however this did not help her symptoms. She denies any fevers. She has had a cough as well. No sore throat. The history is provided by the patient. Nursing Notes were reviewed and I agree. REVIEW OF SYSTEMS    (2-9 systems for level 4, 10 or more for level 5)     Review of Systems   Constitutional: Negative for chills and fever. HENT: Positive for sinus pressure and sore throat. Negative for congestion and ear pain. Eyes: Negative for discharge. Respiratory: Positive for cough. Negative for shortness of breath and wheezing. Cardiovascular: Negative for chest pain and palpitations. Gastrointestinal: Negative for abdominal pain, diarrhea, nausea and vomiting. Genitourinary: Negative for dysuria, frequency, hematuria and urgency. Musculoskeletal: Negative for back pain and neck pain. Skin: Negative for rash. Neurological: Negative for dizziness and headaches. Except as noted above the remainder of the review of systems was reviewed and negative.        PAST MEDICAL HISTORY     Past Medical History:   Diagnosis Date    ADD (attention deficit disorder)     ADHD (attention deficit hyperactivity disorder)     Chronic back pain     Factor 5 Leiden mutation, heterozygous ([facility] [address]     GERD (gastroesophageal reflux disease)     Hypertension          SURGICAL HISTORY       Past Surgical History:   Procedure Laterality Date    CHOLECYSTECTOMY      DILATION AND CURETTAGE OF UTERUS      TONSILLECTOMY AND ADENOIDECTOMY           CURRENT MEDICATIONS       Discharge Medication List as of 10/2/2018  9:33 PM      CONTINUE these medications which have NOT CHANGED    Details   amphetamine-dextroamphetamine (ADDERALL) 20 MG tablet Take 40 mg by mouth daily  Pt takes 20mg a day . Historical Med             ALLERGIES     Demerol hcl [meperidine]; Norco [hydrocodone-acetaminophen]; and Codeine    FAMILY HISTORY       Family History   Problem Relation Age of Onset    Colon Cancer Neg Hx     Colon Polyps Neg Hx     Liver Cancer Neg Hx     Liver Disease Neg Hx     Esophageal Cancer Neg Hx     Stomach Cancer Neg Hx     Rectal Cancer Neg Hx           SOCIAL HISTORY       Social History     Social History    Marital status:      Spouse name: N/A    Number of children: N/A    Years of education: N/A     Social History Main Topics    Smoking status: Current Some Day Smoker     Packs/day: 0.10     Types: Cigarettes    Smokeless tobacco: Never Used    Alcohol use No    Drug use: No    Sexual activity: Not Asked     Other Topics Concern    None     Social History Narrative    None       SCREENINGS           PHYSICAL EXAM    (up to 7 for level 4, 8 or more for level 5)     ED Triage Vitals [10/02/18 2021]   BP Temp Temp Source Pulse Resp SpO2 Height Weight   (!) 126/91 98.3 °F (36.8 °C) Temporal 110 16 100 % 5' 5\" (1.651 m) 120 lb (54.4 kg)       Physical Exam   Constitutional: She is oriented to person, place, and time. She appears well-developed and well-nourished. No distress. HENT:   Head: Normocephalic. Nose: Mucosal edema present. Right sinus exhibits maxillary sinus tenderness and frontal sinus tenderness. Left sinus exhibits maxillary sinus tenderness and frontal sinus tenderness. SpO2: 100%  96%   Weight: 120 lb (54.4 kg)     Height: 5' 5\" (1.651 m)             MDM  Number of Diagnoses or Management Options  Acute recurrent sinusitis, unspecified location:   Diagnosis management comments: Patient presents to the ED today with a month-long history of sinus congestion. At this time and can hydrate the patient give the patient some Rocephin is administered her home with Biaxin and adjunct therapy. Her labs are unremarkable. She responded to Toradol in the ED. PROCEDURES:    Procedures      FINAL IMPRESSION      1.  Acute recurrent sinusitis, unspecified location          DISPOSITION/PLAN   DISPOSITION Decision To Discharge 10/02/2018 09:31:31 PM      PATIENT REFERRED TO:  Racheal Alarcon MD  [address] [place] [address]  [phone]      As needed, If symptoms worsen      DISCHARGE MEDICATIONS:  Discharge Medication List as of 10/2/2018  9:33 PM      START taking these medications    Details   clarithromycin (BIAXIN) 500 MG tablet Take 1 tablet by mouth 2 times daily for 10 days, Disp-20 tablet, R-0Print      methylPREDNISolone (MEDROL, KATHERIN,) 4 MG tablet Take by mouth., Disp-1 kit, R-0Print      cetirizine-psuedoephedrine (ZYRTEC-D) 5-120 MG per extended release tablet Take 1 tablet by mouth 2 times daily, Disp-30 tablet, R-0Print             (Please note that portions of this note were completed with a voice recognition program.  Efforts were made to edit the dictations but occasionally words are mis-transcribed.)    JOSSELINE Srinivasan APRN  10/03/18 1629

## 2018-12-19 ENCOUNTER — HOSPITAL ENCOUNTER (EMERGENCY)
Age: 28
Discharge: HOME | End: 2018-12-19
Attending: EMERGENCY MEDICINE
Payer: COMMERCIAL

## 2018-12-19 VITALS
HEART RATE: 91 BPM | SYSTOLIC BLOOD PRESSURE: 106 MMHG | DIASTOLIC BLOOD PRESSURE: 65 MMHG | TEMPERATURE: 97.9 F | OXYGEN SATURATION: 100 % | RESPIRATION RATE: 15 BRPM

## 2018-12-19 DIAGNOSIS — R51.9 ACUTE NONINTRACTABLE HEADACHE, UNSPECIFIED HEADACHE TYPE: Primary | ICD-10-CM

## 2018-12-19 PROCEDURE — 6360000002 HC RX W HCPCS: Performed by: EMERGENCY MEDICINE

## 2018-12-19 PROCEDURE — 99283 EMERGENCY DEPT VISIT LOW MDM: CPT

## 2018-12-19 PROCEDURE — 96375 TX/PRO/DX INJ NEW DRUG ADDON: CPT

## 2018-12-19 PROCEDURE — 96374 THER/PROPH/DIAG INJ IV PUSH: CPT

## 2018-12-19 PROCEDURE — 99282 EMERGENCY DEPT VISIT SF MDM: CPT | Performed by: EMERGENCY MEDICINE

## 2018-12-19 RX ORDER — METOCLOPRAMIDE HYDROCHLORIDE 5 MG/ML
10 INJECTION INTRAMUSCULAR; INTRAVENOUS ONCE
Status: COMPLETED | OUTPATIENT
Start: 2018-12-19 | End: 2018-12-19

## 2018-12-19 RX ORDER — DEXAMETHASONE SODIUM PHOSPHATE 10 MG/ML
10 INJECTION, SOLUTION INTRAMUSCULAR; INTRAVENOUS ONCE
Status: COMPLETED | OUTPATIENT
Start: 2018-12-19 | End: 2018-12-19

## 2018-12-19 RX ORDER — DIPHENHYDRAMINE HYDROCHLORIDE 50 MG/ML
50 INJECTION INTRAMUSCULAR; INTRAVENOUS ONCE
Status: COMPLETED | OUTPATIENT
Start: 2018-12-19 | End: 2018-12-19

## 2018-12-19 RX ORDER — KETOROLAC TROMETHAMINE 30 MG/ML
30 INJECTION, SOLUTION INTRAMUSCULAR; INTRAVENOUS ONCE
Status: COMPLETED | OUTPATIENT
Start: 2018-12-19 | End: 2018-12-19

## 2018-12-19 RX ORDER — FLUOXETINE 10 MG/1
10 TABLET, FILM COATED ORAL DAILY
COMMUNITY
End: 2019-04-13 | Stop reason: ALTCHOICE

## 2018-12-19 RX ADMIN — DEXAMETHASONE SODIUM PHOSPHATE 10 MG: 10 INJECTION, SOLUTION INTRAMUSCULAR; INTRAVENOUS at 19:57

## 2018-12-19 RX ADMIN — KETOROLAC TROMETHAMINE 30 MG: 30 INJECTION, SOLUTION INTRAMUSCULAR at 19:57

## 2018-12-19 RX ADMIN — DIPHENHYDRAMINE HYDROCHLORIDE 50 MG: 50 INJECTION, SOLUTION INTRAMUSCULAR; INTRAVENOUS at 19:56

## 2018-12-19 RX ADMIN — METOCLOPRAMIDE 10 MG: 5 INJECTION, SOLUTION INTRAMUSCULAR; INTRAVENOUS at 19:55

## 2018-12-19 ASSESSMENT — ENCOUNTER SYMPTOMS
NAUSEA: 1
TROUBLE SWALLOWING: 0
VOMITING: 0
COUGH: 0
PHOTOPHOBIA: 1
SHORTNESS OF BREATH: 0
SORE THROAT: 0
ABDOMINAL DISTENTION: 0
DIARRHEA: 0
CHEST TIGHTNESS: 0
BACK PAIN: 0
ABDOMINAL PAIN: 0
COLOR CHANGE: 0
EYE PAIN: 0
RHINORRHEA: 0
WHEEZING: 0
CONSTIPATION: 0

## 2018-12-19 ASSESSMENT — PAIN SCALES - GENERAL
PAINLEVEL_OUTOF10: 8
PAINLEVEL_OUTOF10: 8
PAINLEVEL_OUTOF10: 3

## 2018-12-19 ASSESSMENT — PAIN DESCRIPTION - LOCATION: LOCATION: HEAD

## 2018-12-20 NOTE — ED PROVIDER NOTES
behavioral problems, confusion, hallucinations and suicidal ideas. PAST MEDICALHISTORY     Past Medical History:   Diagnosis Date    ADD (attention deficit disorder)     ADHD (attention deficit hyperactivity disorder)     Chronic back pain     Factor 5 Leiden mutation, heterozygous ([address]     GERD (gastroesophageal reflux disease)     Hypertension          SURGICAL HISTORY       Past Surgical History:   Procedure Laterality Date    CHOLECYSTECTOMY      DILATION AND CURETTAGE OF UTERUS      TONSILLECTOMY AND ADENOIDECTOMY           CURRENT MEDICATIONS     Previous Medications    FLUOXETINE (PROZAC) 10 MG TABLET    Take 10 mg by mouth daily    LISDEXAMFETAMINE (VYVANSE) 50 MG CAPSULE    Take 50 mg by mouth every morning. .    METHYLPREDNISOLONE (MEDROL, KATHERIN,) 4 MG TABLET    Take by mouth. ALLERGIES     Demerol hcl [meperidine]; Norco [hydrocodone-acetaminophen];  Codeine; and Morphine    FAMILY HISTORY       Family History   Problem Relation Age of Onset    Colon Cancer Neg Hx     Colon Polyps Neg Hx     Liver Cancer Neg Hx     Liver Disease Neg Hx     Esophageal Cancer Neg Hx     Stomach Cancer Neg Hx     Rectal Cancer Neg Hx           SOCIAL HISTORY       Social History     Social History    Marital status:      Spouse name: N/A    Number of children: N/A    Years of education: N/A     Social History Main Topics    Smoking status: Current Some Day Smoker     Packs/day: 1.00     Types: Cigarettes    Smokeless tobacco: Never Used    Alcohol use No    Drug use: No    Sexual activity: Not Asked     Other Topics Concern    None     Social History Narrative    None       SCREENINGS             PHYSICAL EXAM    (up to 7 for level 4, 8 or more for level 5)     ED Triage Vitals   BP Temp Temp Source Pulse Resp SpO2 Height Weight   12/19/18 1825 12/19/18 1825 12/19/18 1849 12/19/18 1825 12/19/18 1825 12/19/18 1825 -- --   119/78 97.9 °F (36.6 °C) Oral 90 18 100 %         Physical

## 2019-03-20 LAB
ALBUMIN SERPL-MCNC: 4.5 G/DL (ref 3.5–5.2)
ALP BLD-CCNC: 76 U/L (ref 35–104)
ALT SERPL-CCNC: <5 U/L (ref 5–33)
ANION GAP SERPL CALCULATED.3IONS-SCNC: 11 MMOL/L (ref 7–19)
AST SERPL-CCNC: 14 U/L (ref 5–32)
BASOPHILS ABSOLUTE: 0.1 K/UL (ref 0–0.2)
BASOPHILS RELATIVE PERCENT: 0.6 % (ref 0–1)
BILIRUB SERPL-MCNC: 0.4 MG/DL (ref 0.2–1.2)
BUN BLDV-MCNC: 8 MG/DL (ref 6–20)
CALCIUM SERPL-MCNC: 9 MG/DL (ref 8.6–10)
CHLORIDE BLD-SCNC: 103 MMOL/L (ref 98–111)
CO2: 27 MMOL/L (ref 22–29)
CREAT SERPL-MCNC: 0.6 MG/DL (ref 0.5–0.9)
EOSINOPHILS ABSOLUTE: 0 K/UL (ref 0–0.6)
EOSINOPHILS RELATIVE PERCENT: 0.2 % (ref 0–5)
FOLATE: 4.5 NG/ML (ref 4.8–37.3)
GFR NON-AFRICAN AMERICAN: >60
GLUCOSE BLD-MCNC: 85 MG/DL (ref 74–109)
HBA1C MFR BLD: 5.4 % (ref 4–6)
HCT VFR BLD CALC: 42.4 % (ref 37–47)
HEMOGLOBIN: 13.5 G/DL (ref 12–16)
LYMPHOCYTES ABSOLUTE: 1.8 K/UL (ref 1.1–4.5)
LYMPHOCYTES RELATIVE PERCENT: 19.4 % (ref 20–40)
MCH RBC QN AUTO: 28.9 PG (ref 27–31)
MCHC RBC AUTO-ENTMCNC: 31.8 G/DL (ref 33–37)
MCV RBC AUTO: 90.8 FL (ref 81–99)
MONOCYTES ABSOLUTE: 0.8 K/UL (ref 0–0.9)
MONOCYTES RELATIVE PERCENT: 9.2 % (ref 0–10)
NEUTROPHILS ABSOLUTE: 6.4 K/UL (ref 1.5–7.5)
NEUTROPHILS RELATIVE PERCENT: 69.9 % (ref 50–65)
PDW BLD-RTO: 12.7 % (ref 11.5–14.5)
PLATELET # BLD: 191 K/UL (ref 130–400)
PMV BLD AUTO: 11.8 FL (ref 9.4–12.3)
POTASSIUM SERPL-SCNC: 3.8 MMOL/L (ref 3.5–5)
RBC # BLD: 4.67 M/UL (ref 4.2–5.4)
SODIUM BLD-SCNC: 141 MMOL/L (ref 136–145)
TOTAL PROTEIN: 7.1 G/DL (ref 6.6–8.7)
VITAMIN B-12: 405 PG/ML (ref 211–946)
WBC # BLD: 9.1 K/UL (ref 4.8–10.8)

## 2019-03-22 LAB — THYROGLOBULIN AB: <0.9 IU/ML (ref 0–4)

## 2019-04-13 ENCOUNTER — HOSPITAL ENCOUNTER (EMERGENCY)
Age: 29
Discharge: HOME | End: 2019-04-13
Payer: COMMERCIAL

## 2019-04-13 VITALS
DIASTOLIC BLOOD PRESSURE: 82 MMHG | HEIGHT: 64 IN | TEMPERATURE: 98.7 F | SYSTOLIC BLOOD PRESSURE: 118 MMHG | OXYGEN SATURATION: 99 % | RESPIRATION RATE: 18 BRPM | WEIGHT: 110 LBS | BODY MASS INDEX: 18.78 KG/M2 | HEART RATE: 96 BPM

## 2019-04-13 DIAGNOSIS — K02.9 DENTAL CARIES: ICD-10-CM

## 2019-04-13 DIAGNOSIS — K08.89 PAIN, DENTAL: Primary | ICD-10-CM

## 2019-04-13 PROCEDURE — 6370000000 HC RX 637 (ALT 250 FOR IP): Performed by: NURSE PRACTITIONER

## 2019-04-13 PROCEDURE — 99282 EMERGENCY DEPT VISIT SF MDM: CPT

## 2019-04-13 PROCEDURE — 99284 EMERGENCY DEPT VISIT MOD MDM: CPT | Performed by: NURSE PRACTITIONER

## 2019-04-13 PROCEDURE — 2500000003 HC RX 250 WO HCPCS: Performed by: NURSE PRACTITIONER

## 2019-04-13 PROCEDURE — 64400 NJX AA&/STRD TRIGEMINAL NRV: CPT

## 2019-04-13 PROCEDURE — 64400 NJX AA&/STRD TRIGEMINAL NRV: CPT | Performed by: NURSE PRACTITIONER

## 2019-04-13 RX ORDER — OXYCODONE HYDROCHLORIDE AND ACETAMINOPHEN 5; 325 MG/1; MG/1
2 TABLET ORAL ONCE
Status: COMPLETED | OUTPATIENT
Start: 2019-04-13 | End: 2019-04-13

## 2019-04-13 RX ORDER — OXYCODONE HYDROCHLORIDE AND ACETAMINOPHEN 5; 325 MG/1; MG/1
1 TABLET ORAL EVERY 6 HOURS PRN
Qty: 12 TABLET | Refills: 0 | Status: SHIPPED | OUTPATIENT
Start: 2019-04-13 | End: 2019-04-16

## 2019-04-13 RX ORDER — DEXTROAMPHETAMINE SACCHARATE, AMPHETAMINE ASPARTATE, DEXTROAMPHETAMINE SULFATE AND AMPHETAMINE SULFATE 5; 5; 5; 5 MG/1; MG/1; MG/1; MG/1
20 TABLET ORAL 2 TIMES DAILY
Status: ON HOLD | COMMUNITY
End: 2022-02-06 | Stop reason: HOSPADM

## 2019-04-13 RX ORDER — LIDOCAINE HYDROCHLORIDE 10 MG/ML
5 INJECTION, SOLUTION EPIDURAL; INFILTRATION; INTRACAUDAL; PERINEURAL ONCE
Status: COMPLETED | OUTPATIENT
Start: 2019-04-13 | End: 2019-04-13

## 2019-04-13 RX ORDER — BUPIVACAINE HYDROCHLORIDE 5 MG/ML
30 INJECTION, SOLUTION EPIDURAL; INTRACAUDAL ONCE
Status: COMPLETED | OUTPATIENT
Start: 2019-04-13 | End: 2019-04-13

## 2019-04-13 RX ORDER — VENLAFAXINE 75 MG/1
75 TABLET ORAL 3 TIMES DAILY
COMMUNITY
End: 2022-10-21

## 2019-04-13 RX ORDER — AMOXICILLIN 500 MG/1
500 CAPSULE ORAL 2 TIMES DAILY
Qty: 20 CAPSULE | Refills: 0 | Status: SHIPPED | OUTPATIENT
Start: 2019-04-13 | End: 2019-04-23

## 2019-04-13 RX ADMIN — LIDOCAINE HYDROCHLORIDE 5 ML: 10 INJECTION, SOLUTION EPIDURAL; INFILTRATION; INTRACAUDAL; PERINEURAL at 20:39

## 2019-04-13 RX ADMIN — BUPIVACAINE HYDROCHLORIDE 150 MG: 5 INJECTION, SOLUTION EPIDURAL; INTRACAUDAL at 20:39

## 2019-04-13 RX ADMIN — OXYCODONE HYDROCHLORIDE AND ACETAMINOPHEN 2 TABLET: 5; 325 TABLET ORAL at 20:38

## 2019-04-13 ASSESSMENT — PAIN SCALES - GENERAL
PAINLEVEL_OUTOF10: 8
PAINLEVEL_OUTOF10: 8

## 2019-04-14 ASSESSMENT — ENCOUNTER SYMPTOMS
VOMITING: 0
SHORTNESS OF BREATH: 0
CONSTIPATION: 0
BACK PAIN: 0
EYE REDNESS: 0
EYE ITCHING: 0
ALLERGIC/IMMUNOLOGIC NEGATIVE: 1
STRIDOR: 0
NAUSEA: 0
CHEST TIGHTNESS: 0
COLOR CHANGE: 0
ABDOMINAL PAIN: 0
BLOOD IN STOOL: 0
EYE DISCHARGE: 0
TROUBLE SWALLOWING: 0
DIARRHEA: 0
WHEEZING: 0
EYE PAIN: 0
SORE THROAT: 0
PHOTOPHOBIA: 0
SINUS PAIN: 0
ABDOMINAL DISTENTION: 0

## 2019-04-14 NOTE — ED PROVIDER NOTES
rash and wound. Allergic/Immunologic: Negative. Neurological: Negative for dizziness and headaches. Hematological: Negative. Psychiatric/Behavioral: Negative. Except as noted above the remainder of the review of systems was reviewed and negative. PAST MEDICAL HISTORY     Past Medical History:   Diagnosis Date    ADD (attention deficit disorder)     ADHD (attention deficit hyperactivity disorder)     Chronic back pain     Factor 5 Leiden mutation, heterozygous ([facility] [address]     GERD (gastroesophageal reflux disease)     Hypertension          SURGICAL HISTORY       Past Surgical History:   Procedure Laterality Date    CHOLECYSTECTOMY      DILATION AND CURETTAGE OF UTERUS      TONSILLECTOMY AND ADENOIDECTOMY           CURRENT MEDICATIONS       Discharge Medication List as of 4/13/2019  9:06 PM      CONTINUE these medications which have NOT CHANGED    Details   amphetamine-dextroamphetamine (ADDERALL) 20 MG tablet Take 20 mg by mouth daily. Historical Med      venlafaxine (EFFEXOR) 75 MG tablet Take 75 mg by mouth 3 times dailyHistorical Med             ALLERGIES     Demerol hcl [meperidine]; Norco [hydrocodone-acetaminophen];  Codeine; and Morphine    FAMILY HISTORY       Family History   Problem Relation Age of Onset    Colon Cancer Neg Hx     Colon Polyps Neg Hx     Liver Cancer Neg Hx     Liver Disease Neg Hx     Esophageal Cancer Neg Hx     Stomach Cancer Neg Hx     Rectal Cancer Neg Hx           SOCIAL HISTORY       Social History     Socioeconomic History    Marital status:      Spouse name: None    Number of children: None    Years of education: None    Highest education level: None   Occupational History    None   Social Needs    Financial resource strain: None    Food insecurity:     Worry: None     Inability: None    Transportation needs:     Medical: None     Non-medical: None   Tobacco Use    Smoking status: Current Some Day Smoker     Packs/day: 1.00     Types: Cigarettes    Smokeless tobacco: Never Used   Substance and Sexual Activity    Alcohol use: No    Drug use: No    Sexual activity: None   Lifestyle    Physical activity:     Days per week: None     Minutes per session: None    Stress: None   Relationships    Social connections:     Talks on phone: None     Gets together: None     Attends [employer] service: None     Active member of club or organization: None     Attends meetings of clubs or organizations: None     Relationship status: None    Intimate partner violence:     Fear of current or ex partner: None     Emotionally abused: None     Physically abused: None     Forced sexual activity: None   Other Topics Concern    None   Social History Narrative    None       SCREENINGS           PHYSICAL EXAM    (up to 7 forlevel 4, 8 or more for level 5)     ED Triage Vitals [04/13/19 2002]   BP Temp Temp Source Pulse Resp SpO2 Height Weight   118/82 98.7 °F (37.1 °C) Temporal 96 18 99 % 5' 4\" (1.626 m) 110 lb (49.9 kg)       Physical Exam   Constitutional: She is oriented to person, place, and time. She appears well-developed and well-nourished. No distress. HENT:   Head: Normocephalic and atraumatic. Nose: Nose normal.   Mouth/Throat: No oropharyngeal exudate. Eyes: Pupils are equal, round, and reactive to light. Conjunctivae and EOM are normal. Right eye exhibits no discharge. Left eye exhibits no discharge. No scleral icterus. Neck: Normal range of motion. Neck supple. No JVD present. No tracheal deviation present. Cardiovascular: Normal rate, regular rhythm, normal heart sounds and intact distal pulses. Exam reveals no gallop and no friction rub. No murmur heard. Pulmonary/Chest: Effort normal and breath sounds normal. No stridor. No respiratory distress. She has no wheezes. She has no rales. She exhibits no tenderness. Abdominal: Soft. Bowel sounds are normal. She exhibits no distension and no mass. There is no tenderness.  There is no diagnosis. The patient was educated on care and need for follow-up. Strict return instructions including red flag signs and symptoms were discussed with the patient. Medications for discharge discussed, and adverse effects reviewed. Questions invited and answered. Patient shows understanding of the discharge information and agrees to follow-up. PROCEDURES:    Procedures      FINAL IMPRESSION      1. Pain, dental    2. Dental caries          DISPOSITION/PLAN   DISPOSITION Decision To Discharge 04/13/2019 08:59:43 PM      PATIENT REFERRED TO:  Philipp Arriaza MD  [address] [place] [address] [id number]  [address] [place], [place] [id number]    Schedule an appointment as soon as possible for a visit   As needed, If symptoms worsen    [facility] EMERGENCY DEPT  Critical access hospital  [phone]    As needed, If symptoms worsen      DISCHARGE MEDICATIONS:  Discharge Medication List as of 4/13/2019  9:06 PM      START taking these medications    Details   oxyCODONE-acetaminophen (PERCOCET) 5-325 MG per tablet Take 1 tablet by mouth every 6 hours as needed for Pain for up to 3 days. Intended supply: 3 days.  Take lowest dose possible to manage pain, Disp-12 tablet, R-0Print             (Please note that portions of this note were completed with a voice recognition program.  Efforts were made to edit the dictations but occasionallywords are mis-transcribed.)    JOSSELINE Diaz CNP, APRN - CNP  04/14/19 2302

## 2019-04-14 NOTE — ED PROVIDER NOTES
Dental Nerve Block Procedure  Date/Time: 4/13/2019 9:09 PM  Performed by: JOSSELINE Humphrey  Authorized by: JOSSELINE Humphrey   Consent: Verbal consent obtained. Risks and benefits: risks, benefits and alternatives were discussed  Consent given by: patient  Local anesthesia used: yes  Anesthesia: local infiltration    Anesthesia:  Local anesthesia used: yes  Local Anesthetic: bupivacaine 0.5% without epinephrine and lidocaine 1% without epinephrine  Anesthetic total: 4 mL  Patient tolerance: Patient tolerated the procedure well with no immediate complications  Comments: Patient received immediate relief after a dental block.            JOSSELINE Humphrey  04/13/19 2111       Jazzy Merrill[address]  04/14/19 9710

## 2019-09-14 ENCOUNTER — OFFICE VISIT (OUTPATIENT)
Dept: URGENT CARE | Age: 29
End: 2019-09-14
Payer: COMMERCIAL

## 2019-09-14 VITALS
HEART RATE: 89 BPM | RESPIRATION RATE: 20 BRPM | WEIGHT: 128.6 LBS | OXYGEN SATURATION: 99 % | SYSTOLIC BLOOD PRESSURE: 109 MMHG | HEIGHT: 64 IN | DIASTOLIC BLOOD PRESSURE: 72 MMHG | TEMPERATURE: 98.5 F | BODY MASS INDEX: 21.95 KG/M2

## 2019-09-14 DIAGNOSIS — A49.01 STAPH AUREUS INFECTION: Primary | ICD-10-CM

## 2019-09-14 DIAGNOSIS — M79.10 MUSCULAR PAIN: ICD-10-CM

## 2019-09-14 DIAGNOSIS — T14.8XXA BRUISING: ICD-10-CM

## 2019-09-14 PROCEDURE — 99214 OFFICE O/P EST MOD 30 MIN: CPT | Performed by: NURSE PRACTITIONER

## 2019-09-14 RX ORDER — BACLOFEN 10 MG/1
10 TABLET ORAL 3 TIMES DAILY PRN
Qty: 21 TABLET | Refills: 0 | Status: SHIPPED | OUTPATIENT
Start: 2019-09-14 | End: 2020-03-22 | Stop reason: ALTCHOICE

## 2019-09-14 RX ORDER — CEPHALEXIN 500 MG/1
500 CAPSULE ORAL 3 TIMES DAILY
Qty: 21 CAPSULE | Refills: 0 | Status: SHIPPED | OUTPATIENT
Start: 2019-09-14 | End: 2020-03-22 | Stop reason: ALTCHOICE

## 2019-09-14 ASSESSMENT — ENCOUNTER SYMPTOMS: VOICE CHANGE: 1

## 2019-09-14 NOTE — PROGRESS NOTES
She has a normal mood and affect. Her behavior is normal. Judgment and thought content normal.     /72   Pulse 89   Temp 98.5 °F (36.9 °C) (Oral)   Resp 20   Ht 5' 4\" (1.626 m)   Wt 128 lb 9.6 oz (58.3 kg)   LMP 09/14/2019   SpO2 99%   BMI 22.07 kg/m²     Assessment/ Plan       Diagnosis Orders   1. Staph aureus infection  cephALEXin (KEFLEX) 500 MG capsule    mupirocin (BACTROBAN) 2 % ointment   2. Muscular pain  baclofen (LIORESAL) 10 MG tablet   3. Bruising         No orders of the defined types were placed in this encounter. Patient given educational materials - see patient instructions. Discussed use, benefit, and side effects of prescribed medications. All patient questions answered. Pt voiced understanding. Patient agreedwith treatment plan. Follow up as needed    Patient Instructions     Patient Education        Neck Spasm: Exercises  Introduction  Here are some examples of exercises for you to try. The exercises may be suggested for a condition or for rehabilitation. Start each exercise slowly. Ease off the exercises if you start to have pain. You will be told when to start these exercises and which ones will work best for you. How to do the exercises  Levator scapula stretch    1. Sit in a firm chair, or stand up straight. 2. Gently tilt your head toward your left shoulder. 3. Turn your head to look down into your armpit, bending your head slightly forward. Let the weight of your head stretch your neck muscles. 4. Hold for 15 to 30 seconds. 5. Return to your starting position. 6. Follow the same instructions above, but tilt your head toward your right shoulder. 7. Repeat 2 to 4 times toward each shoulder. Upper trapezius stretch    1. Sit in a firm chair, or stand up straight. 2. This stretch works best if you keep your shoulder down as you lean away from it.  To help you remember to do this, start by relaxing your shoulders and lightly holding on to your thighs or your disclaims any warranty or liability for your use of this information. Take baclofen as needed for muscle pain  Ice to are four times per day. IF no improvement see PCP  Patient Education        Neck Spasm: Exercises  Introduction  Here are some examples of exercises for you to try. The exercises may be suggested for a condition or for rehabilitation. Start each exercise slowly. Ease off the exercises if you start to have pain. You will be told when to start these exercises and which ones will work best for you. How to do the exercises  Levator scapula stretch    8. Sit in a firm chair, or stand up straight. 9. Gently tilt your head toward your left shoulder. 10. Turn your head to look down into your armpit, bending your head slightly forward. Let the weight of your head stretch your neck muscles. 11. Hold for 15 to 30 seconds. 12. Return to your starting position. 13. Follow the same instructions above, but tilt your head toward your right shoulder. 14. Repeat 2 to 4 times toward each shoulder. Upper trapezius stretch    6. Sit in a firm chair, or stand up straight. 7. This stretch works best if you keep your shoulder down as you lean away from it. To help you remember to do this, start by relaxing your shoulders and lightly holding on to your thighs or your chair. 8. Tilt your head toward your shoulder and hold for 15 to 30 seconds. Let the weight of your head stretch your muscles. 9. If you would like a little added stretch, place your arm behind your back. Use the arm opposite of the direction you are tilting your head. For example, if you are tilting your head to the left, place your right arm behind your back. 10. Repeat 2 to 4 times toward each shoulder. Neck rotation    5. Sit in a firm chair, or stand up straight. 6. Keeping your chin level, turn your head to the right, and hold for 15 to 30 seconds. 7. Turn your head to the left, and hold for 15 to 30 seconds.   8. Repeat 2 to 4

## 2019-09-14 NOTE — PATIENT INSTRUCTIONS
Patient Education        Neck Spasm: Exercises  Introduction  Here are some examples of exercises for you to try. The exercises may be suggested for a condition or for rehabilitation. Start each exercise slowly. Ease off the exercises if you start to have pain. You will be told when to start these exercises and which ones will work best for you. How to do the exercises  Levator scapula stretch    1. Sit in a firm chair, or stand up straight. 2. Gently tilt your head toward your left shoulder. 3. Turn your head to look down into your armpit, bending your head slightly forward. Let the weight of your head stretch your neck muscles. 4. Hold for 15 to 30 seconds. 5. Return to your starting position. 6. Follow the same instructions above, but tilt your head toward your right shoulder. 7. Repeat 2 to 4 times toward each shoulder. Upper trapezius stretch    1. Sit in a firm chair, or stand up straight. 2. This stretch works best if you keep your shoulder down as you lean away from it. To help you remember to do this, start by relaxing your shoulders and lightly holding on to your thighs or your chair. 3. Tilt your head toward your shoulder and hold for 15 to 30 seconds. Let the weight of your head stretch your muscles. 4. If you would like a little added stretch, place your arm behind your back. Use the arm opposite of the direction you are tilting your head. For example, if you are tilting your head to the left, place your right arm behind your back. 5. Repeat 2 to 4 times toward each shoulder. Neck rotation    1. Sit in a firm chair, or stand up straight. 2. Keeping your chin level, turn your head to the right, and hold for 15 to 30 seconds. 3. Turn your head to the left, and hold for 15 to 30 seconds. 4. Repeat 2 to 4 times to each side. Chin tuck    1. Lie on the floor with a rolled-up towel under your neck. Your head should be touching the floor.   2. Slowly bring your chin toward the front of your neck. 3. Hold for a count of 6, and then relax for up to 10 seconds. 4. Repeat 8 to 12 times. Forward neck flexion    1. Sit in a firm chair, or stand up straight. 2. Bend your head forward. 3. Hold for 15 to 30 seconds, then return to your starting position. 4. Repeat 2 to 4 times. Follow-up care is a key part of your treatment and safety. Be sure to make and go to all appointments, and call your doctor if you are having problems. It's also a good idea to know your test results and keep a list of the medicines you take. Where can you learn more? Go to https://[employer]. org and sign in to your [employer] account. Enter P962 in the [employer] box to learn more about \"Neck Spasm: Exercises. \"     If you do not have an account, please click on the \"Sign Up Now\" link. Current as of: September 20, 2018  Content Version: 12.1  © 7978-0991 Healthwise, Incorporated. Care instructions adapted under license by [facility] ([facility]). If you have questions about a medical condition or this instruction, always ask your healthcare professional. [place] [address] any warranty or liability for your use of this information. Take baclofen as needed for muscle pain  Ice to are four times per day. IF no improvement see PCP  Patient Education        Neck Spasm: Exercises  Introduction  Here are some examples of exercises for you to try. The exercises may be suggested for a condition or for rehabilitation. Start each exercise slowly. Ease off the exercises if you start to have pain. You will be told when to start these exercises and which ones will work best for you. How to do the exercises  Levator scapula stretch    8. Sit in a firm chair, or stand up straight. 9. Gently tilt your head toward your left shoulder. 10. Turn your head to look down into your armpit, bending your head slightly forward. Let the weight of your head stretch your neck muscles.   11. Hold for 15 to Enter P962 in the [facility] box to learn more about \"Neck Spasm: Exercises. \"     If you do not have an account, please click on the \"Sign Up Now\" link. Current as of: September 20, 2018  Content Version: 12.1  © 1968-8833 [employer]. Care instructions adapted under license by [facility] ([facility]). If you have questions about a medical condition or this instruction, always ask your healthcare professional. [address] any warranty or liability for your use of this information. Patient Education        Learning About Staph Infection  What is a staph infection? Staphylococcus aureus (staph) is a type of bacteria that can cause infections. Staph bacteria normally live on the skin. They don't usually cause problems. They only become a problem when they cause infection. The infection has a higher chance of becoming serious in people who are weak or ill or who are being treated in the hospital. Sometimes staph bacteria can cause more serious widespread infection. In the hospital, staph infections are more likely to occur in wounds, burns, or places where there is a break in the skin or where tubes enter the body. In the community, these infections are more likely to occur among people who have cuts or wounds and who have close contact with one another. What are the symptoms? Symptoms of a staph infection depend on where the infection is. If the infection is:  · In a wound, that area of your skin may be red or tender. · On your skin, you may get a red, tender boil or abscess. You may think you have been bitten by a spider or insect. · In your urine, you may have symptoms of a urinary tract infection. These include burning when you urinate. · In your blood or more widespread, you may have a fever and feel very ill. How is a staph infection treated? The doctor will take a sample of your infected wound or a blood or urine sample.  The sample is tested to see which antibiotics can kill the bacteria in it. This test may take several days. If you have a staph infection, your doctor may:  · Drain your wound. · Give you antibiotics as pills or through a needle put in your vein (IV). You may have to stay in the hospital for treatment. In the hospital, you may be kept apart from others. This is to reduce the chances of spreading the bacteria. How can you prevent a staph infection? · Practice good hygiene. ? Wash your hands often with soap and clean, running water. You can also use an alcohol-based hand . Hand-washing is the best way to avoid spreading the bacteria. ? Keep cuts and scrapes clean. Cover them with a bandage. Avoid contact with other people's wounds or bandages. ? Don't share personal items such as towels, washcloths, razors, or clothing. ? Keep your environment clean by using a disinfectant to wipe surfaces you touch a lot. These include countertops, doorknobs, and light switches. · Your doctor may give you an ointment to put inside your nose. This is to kill staph bacteria that may cause another infection. · Be smart about using antibiotics. Antibiotics can help treat bacterial infections, but they can't cure viral infections. Always ask your doctor if antibiotics are the best treatment. · If your doctor prescribed antibiotics, take them as directed. Do not stop taking them just because you feel better. You need to take the full course of antibiotics. · If you're in the hospital, remind doctors and nurses to wash their hands before they touch you. Follow-up care is a key part of your treatment and safety. Be sure to make and go to all appointments, and call your doctor if you are having problems. It's also a good idea to know your test results and keep a list of the medicines you take. Where can you learn more? Go to https://[employer]. org and sign in to your [employer] account.  Enter H358 in the [employer] box to

## 2020-01-08 ENCOUNTER — TRANSCRIBE ORDERS (OUTPATIENT)
Dept: ADMINISTRATIVE | Facility: HOSPITAL | Age: 30
End: 2020-01-08

## 2020-01-08 ENCOUNTER — APPOINTMENT (OUTPATIENT)
Dept: LAB | Facility: HOSPITAL | Age: 30
End: 2020-01-08

## 2020-01-08 DIAGNOSIS — R30.0 DYSURIA: Primary | ICD-10-CM

## 2020-01-08 LAB
BILIRUB UR QL STRIP: NEGATIVE
CLARITY UR: CLEAR
COLOR UR: YELLOW
GLUCOSE UR STRIP-MCNC: ABNORMAL MG/DL
HGB UR QL STRIP.AUTO: ABNORMAL
KETONES UR QL STRIP: NEGATIVE
LEUKOCYTE ESTERASE UR QL STRIP.AUTO: ABNORMAL
NITRITE UR QL STRIP: POSITIVE
PH UR STRIP.AUTO: 6.5 [PH] (ref 5–8)
PROT UR QL STRIP: ABNORMAL
SP GR UR STRIP: 1.01 (ref 1–1.03)
UROBILINOGEN UR QL STRIP: ABNORMAL

## 2020-01-08 PROCEDURE — 81003 URINALYSIS AUTO W/O SCOPE: CPT | Performed by: NURSE PRACTITIONER

## 2020-01-08 PROCEDURE — 87186 SC STD MICRODIL/AGAR DIL: CPT | Performed by: NURSE PRACTITIONER

## 2020-01-08 PROCEDURE — 87077 CULTURE AEROBIC IDENTIFY: CPT | Performed by: NURSE PRACTITIONER

## 2020-01-08 PROCEDURE — 87086 URINE CULTURE/COLONY COUNT: CPT | Performed by: NURSE PRACTITIONER

## 2020-01-10 LAB — BACTERIA SPEC AEROBE CULT: ABNORMAL

## 2020-03-22 ENCOUNTER — HOSPITAL ENCOUNTER (EMERGENCY)
Age: 30
Discharge: HOME | End: 2020-03-22
Payer: COMMERCIAL

## 2020-03-22 ENCOUNTER — APPOINTMENT (OUTPATIENT)
Dept: GENERAL RADIOLOGY | Age: 30
End: 2020-03-22
Payer: COMMERCIAL

## 2020-03-22 VITALS
SYSTOLIC BLOOD PRESSURE: 134 MMHG | HEART RATE: 100 BPM | DIASTOLIC BLOOD PRESSURE: 86 MMHG | OXYGEN SATURATION: 100 % | RESPIRATION RATE: 20 BRPM | TEMPERATURE: 98.5 F | WEIGHT: 130 LBS | BODY MASS INDEX: 22.2 KG/M2 | HEIGHT: 64 IN

## 2020-03-22 LAB
RAPID INFLUENZA  B AGN: NEGATIVE
RAPID INFLUENZA A AGN: NEGATIVE

## 2020-03-22 PROCEDURE — 87804 INFLUENZA ASSAY W/OPTIC: CPT

## 2020-03-22 PROCEDURE — 71046 X-RAY EXAM CHEST 2 VIEWS: CPT

## 2020-03-22 PROCEDURE — 99283 EMERGENCY DEPT VISIT LOW MDM: CPT

## 2020-03-22 RX ORDER — FLUTICASONE PROPIONATE 50 MCG
1 SPRAY, SUSPENSION (ML) NASAL DAILY
Qty: 1 BOTTLE | Refills: 0 | Status: SHIPPED | OUTPATIENT
Start: 2020-03-22 | End: 2022-10-21

## 2020-03-22 RX ORDER — ALBUTEROL SULFATE 90 UG/1
2 AEROSOL, METERED RESPIRATORY (INHALATION) EVERY 4 HOURS PRN
Qty: 1 INHALER | Refills: 0 | Status: SHIPPED | OUTPATIENT
Start: 2020-03-22 | End: 2022-10-21

## 2020-03-22 RX ORDER — AZITHROMYCIN 250 MG/1
TABLET, FILM COATED ORAL
Qty: 1 PACKET | Refills: 0 | Status: SHIPPED | OUTPATIENT
Start: 2020-03-22 | End: 2020-03-26

## 2020-03-22 ASSESSMENT — ENCOUNTER SYMPTOMS
SINUS PRESSURE: 1
VOMITING: 0
COUGH: 1

## 2020-03-22 NOTE — ED PROVIDER NOTES
[facility] EMERGENCY DEPT  eMERGENCY dEPARTMENT eNCOUnter      Pt Name: Demetrius Casas  MRN: [id number]  [place] 1990  Date of evaluation: 3/22/2020  Provider: Liz Friend, [address]       Chief Complaint   Patient presents with    Cough     deep cough, non-productive    Sinusitis    Fever         HISTORY OF PRESENT ILLNESS   (Location/Symptom, Timing/Onset,Context/Setting, Quality, Duration, Modifying Factors, Severity)  Note limiting factors. Saba De Pazis a 30 y.o. female who presents to the emergency department for evaluation of cough. Pt tells me that she has had cough and congestion over past 7 days. She has had sinus congestion over past 2 weeks relating that she has problems with seasonal allergies. She tells me that she was at [facility] today told had fever and sent here for further evaluation. She has had no breathing difficulty. She tells me that she works at [employer] and coworkers are concerned about her cough. She tells me that she smokes cigarettes. She has no history of pe/dvt. She has had no travel or sick contacts at home. She has had no vomiting or diarrhea. HPI    Nursing Notes were reviewed. REVIEW OF SYSTEMS    (2-9 systems for level 4, 10 or more for level 5)     Review of Systems   Constitutional: Positive for fever. HENT: Positive for congestion and sinus pressure. Respiratory: Positive for cough. Gastrointestinal: Negative for vomiting. A complete review of systems was performed and is negative except as noted above in the HPI.        PAST MEDICAL HISTORY     Past Medical History:   Diagnosis Date    ADD (attention deficit disorder)     ADHD (attention deficit hyperactivity disorder)     Chronic back pain     Factor 5 Leiden mutation, heterozygous ([facility] [address]     GERD (gastroesophageal reflux disease)     Hypertension          SURGICAL HISTORY       Past Surgical History:   Procedure Laterality Date    CHOLECYSTECTOMY      DILATION AND CURETTAGE OF more for level 5)     ED Triage Vitals   BP Temp Temp src Pulse Resp SpO2 Height Weight   -- -- -- -- -- -- -- --     Vitals:    03/22/20 1400   BP: 134/86   Pulse: 100   Resp: 20   Temp: 98.5 °F (36.9 °C)   SpO2: 100%   Weight: 130 lb (59 kg)   Height: 5' 4\" (1.626 m)         Physical Exam  Vitals signs reviewed. HENT:      Head: Normocephalic. Right Ear: Tympanic membrane, ear canal and external ear normal.      Left Ear: Tympanic membrane, ear canal and external ear normal.      Mouth/Throat:      Mouth: Mucous membranes are moist.      Pharynx: Oropharynx is clear. Eyes:      Conjunctiva/sclera: Conjunctivae normal.      Pupils: Pupils are equal, round, and reactive to light. Neck:      Musculoskeletal: Normal range of motion. Cardiovascular:      Rate and Rhythm: Normal rate and regular rhythm. Heart sounds: Normal heart sounds. Pulmonary:      Effort: Pulmonary effort is normal.      Breath sounds: Normal breath sounds. Abdominal:      General: Bowel sounds are normal.      Palpations: Abdomen is soft. Musculoskeletal: Normal range of motion. Skin:     General: Skin is warm and dry. Neurological:      Mental Status: She is alert and oriented to person, place, and time. DIAGNOSTIC RESULTS     EKG: All EKG's are interpreted by the Emergency Department Physician who either signs or Co-signs this chart in the absence of acardiologist.        RADIOLOGY:   Non-plain film images such as CT, Ultrasound andMRI are read by the radiologist. Plain radiographic images are visualized and preliminarily interpreted by the emergency physician with the below findings:        Interpretation per the Radiologist below, if available at the time of this note:    XR CHEST STANDARD (2 VW)   Final Result   Impression:   No acute cardiopulmonary disease.    Signed by Dr Yuval Solis on 3/22/2020 2:38 PM            ED BEDSIDE ULTRASOUND:   Performed by ED Physician - none    LABS:  Labs Reviewed

## 2021-01-11 DIAGNOSIS — F90.9 ATTENTION DEFICIT HYPERACTIVITY DISORDER (ADHD), UNSPECIFIED ADHD TYPE: Primary | ICD-10-CM

## 2021-01-11 RX ORDER — DEXTROAMPHETAMINE SACCHARATE, AMPHETAMINE ASPARTATE MONOHYDRATE, DEXTROAMPHETAMINE SULFATE AND AMPHETAMINE SULFATE 7.5; 7.5; 7.5; 7.5 MG/1; MG/1; MG/1; MG/1
30 CAPSULE, EXTENDED RELEASE ORAL EVERY MORNING
Qty: 30 CAPSULE | Refills: 0 | Status: SHIPPED | OUTPATIENT
Start: 2021-01-11 | End: 2021-02-09

## 2021-01-11 RX ORDER — DEXTROAMPHETAMINE SACCHARATE, AMPHETAMINE ASPARTATE MONOHYDRATE, DEXTROAMPHETAMINE SULFATE AND AMPHETAMINE SULFATE 7.5; 7.5; 7.5; 7.5 MG/1; MG/1; MG/1; MG/1
30 CAPSULE, EXTENDED RELEASE ORAL EVERY MORNING
Qty: 30 CAPSULE | Refills: 0 | Status: CANCELLED | OUTPATIENT
Start: 2021-01-11

## 2021-01-11 RX ORDER — DEXTROAMPHETAMINE SACCHARATE, AMPHETAMINE ASPARTATE MONOHYDRATE, DEXTROAMPHETAMINE SULFATE AND AMPHETAMINE SULFATE 6.25; 6.25; 6.25; 6.25 MG/1; MG/1; MG/1; MG/1
25 CAPSULE, EXTENDED RELEASE ORAL EVERY MORNING
Qty: 30 CAPSULE | Refills: 0 | Status: SHIPPED | OUTPATIENT
Start: 2021-01-11 | End: 2021-01-11

## 2021-02-08 DIAGNOSIS — F90.9 ATTENTION DEFICIT HYPERACTIVITY DISORDER (ADHD), UNSPECIFIED ADHD TYPE: ICD-10-CM

## 2021-02-08 RX ORDER — DEXTROAMPHETAMINE SACCHARATE, AMPHETAMINE ASPARTATE MONOHYDRATE, DEXTROAMPHETAMINE SULFATE AND AMPHETAMINE SULFATE 7.5; 7.5; 7.5; 7.5 MG/1; MG/1; MG/1; MG/1
30 CAPSULE, EXTENDED RELEASE ORAL EVERY MORNING
Qty: 30 CAPSULE | Refills: 0 | OUTPATIENT
Start: 2021-02-08

## 2021-02-08 NOTE — TELEPHONE ENCOUNTER
Caller: Theo Bestalejandro MIGUEL    Relationship: Self    Best call back number: [phone]    Medication needed:   Requested Prescriptions     Pending Prescriptions Disp Refills   • amphetamine-dextroamphetamine XR (Adderall XR) 30 MG 24 hr capsule 30 capsule 0     Sig: Take 1 capsule by mouth Every Morning       When do you need the refill by:2/8/21    What details did the patient provide when requesting the medication:     Does the patient have less than a 3 day supply:  [x] Yes  [] No    What is the patient's preferred pharmacy: [id number] - [place], [place] - [address] AT [facility] [address]  [phone] [facility] [phone] FX

## 2021-02-09 ENCOUNTER — OFFICE VISIT (OUTPATIENT)
Dept: FAMILY MEDICINE CLINIC | Facility: CLINIC | Age: 31
End: 2021-02-09

## 2021-02-09 VITALS
TEMPERATURE: 97.4 F | HEIGHT: 64 IN | OXYGEN SATURATION: 99 % | SYSTOLIC BLOOD PRESSURE: 118 MMHG | HEART RATE: 106 BPM | BODY MASS INDEX: 25.44 KG/M2 | WEIGHT: 149 LBS | DIASTOLIC BLOOD PRESSURE: 80 MMHG

## 2021-02-09 DIAGNOSIS — M54.31 RIGHT SIDED SCIATICA: ICD-10-CM

## 2021-02-09 DIAGNOSIS — F90.0 ATTENTION DEFICIT HYPERACTIVITY DISORDER (ADHD), PREDOMINANTLY INATTENTIVE TYPE: Primary | ICD-10-CM

## 2021-02-09 DIAGNOSIS — M25.561 ACUTE PAIN OF RIGHT KNEE: ICD-10-CM

## 2021-02-09 DIAGNOSIS — M25.512 ACUTE PAIN OF LEFT SHOULDER: ICD-10-CM

## 2021-02-09 DIAGNOSIS — F41.8 MIXED ANXIETY AND DEPRESSIVE DISORDER: ICD-10-CM

## 2021-02-09 DIAGNOSIS — F39 MOOD DISORDER (HCC): ICD-10-CM

## 2021-02-09 PROCEDURE — 99214 OFFICE O/P EST MOD 30 MIN: CPT | Performed by: NURSE PRACTITIONER

## 2021-02-09 PROCEDURE — 96372 THER/PROPH/DIAG INJ SC/IM: CPT | Performed by: NURSE PRACTITIONER

## 2021-02-09 RX ORDER — SUMATRIPTAN 50 MG/1
50 TABLET, FILM COATED ORAL DAILY PRN
COMMUNITY
Start: 2020-11-13 | End: 2021-06-09 | Stop reason: SDUPTHER

## 2021-02-09 RX ORDER — KETOROLAC TROMETHAMINE 30 MG/ML
60 INJECTION, SOLUTION INTRAMUSCULAR; INTRAVENOUS EVERY 6 HOURS
Status: SHIPPED | OUTPATIENT
Start: 2021-02-09 | End: 2021-02-10

## 2021-02-09 RX ORDER — LAMOTRIGINE 25 MG/1
50 TABLET ORAL DAILY
Qty: 60 TABLET | Refills: 2 | Status: SHIPPED | OUTPATIENT
Start: 2021-02-09 | End: 2021-05-26 | Stop reason: SDUPTHER

## 2021-02-09 RX ORDER — DEXAMETHASONE SODIUM PHOSPHATE 4 MG/ML
8 INJECTION, SOLUTION INTRA-ARTICULAR; INTRALESIONAL; INTRAMUSCULAR; INTRAVENOUS; SOFT TISSUE ONCE
Status: COMPLETED | OUTPATIENT
Start: 2021-02-09 | End: 2021-02-09

## 2021-02-09 RX ORDER — VENLAFAXINE HYDROCHLORIDE 150 MG/1
150 CAPSULE, EXTENDED RELEASE ORAL DAILY
COMMUNITY
Start: 2020-11-13 | End: 2021-02-09 | Stop reason: SDUPTHER

## 2021-02-09 RX ORDER — LAMOTRIGINE 25 MG/1
50 TABLET ORAL 2 TIMES DAILY
COMMUNITY
Start: 2020-11-13 | End: 2021-02-09 | Stop reason: SDUPTHER

## 2021-02-09 RX ORDER — VENLAFAXINE HYDROCHLORIDE 150 MG/1
150 CAPSULE, EXTENDED RELEASE ORAL EVERY MORNING
Qty: 30 CAPSULE | Refills: 2 | Status: SHIPPED | OUTPATIENT
Start: 2021-02-09 | End: 2021-05-26 | Stop reason: SDUPTHER

## 2021-02-09 RX ADMIN — DEXAMETHASONE SODIUM PHOSPHATE 8 MG: 4 INJECTION, SOLUTION INTRA-ARTICULAR; INTRALESIONAL; INTRAMUSCULAR; INTRAVENOUS; SOFT TISSUE at 17:15

## 2021-02-09 RX ADMIN — KETOROLAC TROMETHAMINE 60 MG: 30 INJECTION, SOLUTION INTRAMUSCULAR; INTRAVENOUS at 17:16

## 2021-02-09 NOTE — PROGRESS NOTES
"Chief Complaint  ADHD (3 month med chk f/u)    Subjective    History of Present Illness      Patient presents to [facility] for   Here for adhd follow up, also has body aches to discuss.        Review of Systems   Musculoskeletal: Positive for arthralgias, back pain and myalgias.       I have reviewed and agree with the HPI and ROS information as above.  VON Brar     Objective   Vital Signs:   /80 (BP Location: Left arm, Patient Position: Sitting)   Pulse 106   Temp 97.4 °F (36.3 °C)   Ht 162.6 cm (64\")   Wt 67.6 kg (149 lb)   SpO2 99%   BMI 25.58 kg/m²       Physical Exam  Constitutional:       Appearance: Normal appearance. She is well-developed.   HENT:      Head: Normocephalic and atraumatic.      Right Ear: Tympanic membrane, ear canal and external ear normal.      Left Ear: Tympanic membrane, ear canal and external ear normal.      Nose: Nose normal. No septal deviation, nasal tenderness or congestion.      Mouth/Throat:      Lips: Pink. No lesions.      Mouth: Mucous membranes are moist. No oral lesions.      Dentition: Normal dentition.      Pharynx: Oropharynx is clear. No pharyngeal swelling, oropharyngeal exudate or posterior oropharyngeal erythema.   Eyes:      General: Lids are normal. Vision grossly intact. No scleral icterus.        Right eye: No discharge.         Left eye: No discharge.      Extraocular Movements: Extraocular movements intact.      Conjunctiva/sclera: Conjunctivae normal.      Right eye: Right conjunctiva is not injected.      Left eye: Left conjunctiva is not injected.      Pupils: Pupils are equal, round, and reactive to light.   Neck:      Musculoskeletal: Full passive range of motion without pain, normal range of motion and neck supple.      Thyroid: No thyroid mass.      Trachea: Trachea normal.   Cardiovascular:      Rate and Rhythm: Normal rate and regular rhythm.      Heart sounds: Normal heart sounds. No murmur. No " gallop.    Pulmonary:      Effort: Pulmonary effort is normal.      Breath sounds: Normal breath sounds and air entry. No wheezing, rhonchi or rales.   Abdominal:      General: There is no distension.      Palpations: Abdomen is soft. There is no mass.      Tenderness: There is no abdominal tenderness. There is no right CVA tenderness, left CVA tenderness, guarding or rebound.   Musculoskeletal: Normal range of motion.         General: Tenderness (left shoulder with rotation, has full rom, right inner lateral knee with movement, rom, not with walking, right upper leg sciatica type tenderness) present. No deformity.      Thoracic back: Normal.      Right lower leg: No edema.      Left lower leg: No edema.   Skin:     General: Skin is warm and dry.      Coloration: Skin is not jaundiced.      Findings: No rash.   Neurological:      Mental Status: She is alert and oriented to person, place, and time.      Cranial Nerves: Cranial nerves are intact.      Sensory: Sensation is intact.      Motor: Motor function is intact.      Coordination: Coordination is intact.      Gait: Gait is intact.      Deep Tendon Reflexes: Reflexes are normal and symmetric.   Psychiatric:         Mood and Affect: Mood and affect normal.         Judgment: Judgment normal.          Result Review  Data Reviewed:                   Assessment and Plan    Patient's Body mass index is 25.58 kg/m².     Problem List Items Addressed This Visit     None      Visit Diagnoses     Attention deficit hyperactivity disorder (ADHD), predominantly inattentive type    -  Primary    Relevant Medications    venlafaxine XR (EFFEXOR-XR) 150 MG 24 hr capsule    Other Relevant Orders    Urine Drug Screen - Urine, Clean Catch    Mood disorder (CMS/HCC)        Relevant Medications    venlafaxine XR (EFFEXOR-XR) 150 MG 24 hr capsule    lamoTRIgine (LaMICtal) 25 MG tablet    Mixed anxiety and depressive disorder        Relevant Medications    venlafaxine XR (EFFEXOR-XR) 150  MG 24 hr capsule    Acute pain of left shoulder        Right sided sciatica        Relevant Medications    dexamethasone (DECADRON) injection 8 mg (Completed) (Start on 2/9/2021  6:00 PM)    ketorolac (TORADOL) injection 60 mg (Start on 2/9/2021  6:00 PM)    Acute pain of right knee        Relevant Orders    XR Knee 1 or 2 View Right        1.  ADHD well controlled continue same, return for UDS when she brings her son in for his and will have any x-ray at that time as well.  2.  Mood is stable on lamotrigine and Effexor continue same.  3.  New complaints of left shoulder pain right upper leg sciatica type symptoms and right knee pain.  Will give steroid injection and Toradol injection in office patient can return for x-ray of right knee as well.  May need to consider physical therapy/MRI if no improvement.  Of note-Does have a history of chronic low back problems.      Follow Up   Return in about 3 months (around 5/9/2021).  Patient was given instructions and counseling regarding her condition or for health maintenance advice. Please see specific information pulled into the AVS if appropriate.

## 2021-02-09 NOTE — PATIENT INSTRUCTIONS

## 2021-02-10 RX ORDER — DEXTROAMPHETAMINE SACCHARATE, AMPHETAMINE ASPARTATE MONOHYDRATE, DEXTROAMPHETAMINE SULFATE AND AMPHETAMINE SULFATE 7.5; 7.5; 7.5; 7.5 MG/1; MG/1; MG/1; MG/1
30 CAPSULE, EXTENDED RELEASE ORAL EVERY MORNING
Qty: 30 CAPSULE | Refills: 0 | Status: SHIPPED | OUTPATIENT
Start: 2021-04-06 | End: 2021-04-21 | Stop reason: SDUPTHER

## 2021-02-10 RX ORDER — DEXTROAMPHETAMINE SACCHARATE, AMPHETAMINE ASPARTATE MONOHYDRATE, DEXTROAMPHETAMINE SULFATE AND AMPHETAMINE SULFATE 7.5; 7.5; 7.5; 7.5 MG/1; MG/1; MG/1; MG/1
30 CAPSULE, EXTENDED RELEASE ORAL EVERY MORNING
Qty: 30 CAPSULE | Refills: 0 | Status: SHIPPED | OUTPATIENT
Start: 2021-03-09 | End: 2021-03-24 | Stop reason: SDUPTHER

## 2021-02-10 RX ORDER — DEXTROAMPHETAMINE SACCHARATE, AMPHETAMINE ASPARTATE MONOHYDRATE, DEXTROAMPHETAMINE SULFATE AND AMPHETAMINE SULFATE 7.5; 7.5; 7.5; 7.5 MG/1; MG/1; MG/1; MG/1
30 CAPSULE, EXTENDED RELEASE ORAL EVERY MORNING
Qty: 30 CAPSULE | Refills: 0 | Status: SHIPPED | OUTPATIENT
Start: 2021-02-10 | End: 2021-03-12

## 2021-02-23 ENCOUNTER — HOSPITAL ENCOUNTER (OUTPATIENT)
Dept: GENERAL RADIOLOGY | Facility: HOSPITAL | Age: 31
Discharge: HOME | End: 2021-02-23

## 2021-02-23 ENCOUNTER — LAB (OUTPATIENT)
Dept: LAB | Facility: HOSPITAL | Age: 31
End: 2021-02-23

## 2021-02-23 DIAGNOSIS — F90.0 ATTENTION DEFICIT HYPERACTIVITY DISORDER (ADHD), PREDOMINANTLY INATTENTIVE TYPE: ICD-10-CM

## 2021-02-23 DIAGNOSIS — M25.561 ACUTE PAIN OF RIGHT KNEE: ICD-10-CM

## 2021-02-23 LAB
AMPHET+METHAMPHET UR QL: POSITIVE
AMPHETAMINES UR QL: NEGATIVE
BARBITURATES UR QL SCN: NEGATIVE
BENZODIAZ UR QL SCN: NEGATIVE
BUPRENORPHINE SERPL-MCNC: NEGATIVE NG/ML
CANNABINOIDS SERPL QL: POSITIVE
COCAINE UR QL: NEGATIVE
METHADONE UR QL SCN: NEGATIVE
OPIATES UR QL: NEGATIVE
OXYCODONE UR QL SCN: NEGATIVE
PCP UR QL SCN: NEGATIVE
PROPOXYPH UR QL: NEGATIVE
TRICYCLICS UR QL SCN: NEGATIVE

## 2021-02-23 PROCEDURE — 80306 DRUG TEST PRSMV INSTRMNT: CPT

## 2021-02-23 PROCEDURE — 73560 X-RAY EXAM OF KNEE 1 OR 2: CPT

## 2021-02-25 ENCOUNTER — TELEPHONE (OUTPATIENT)
Dept: FAMILY MEDICINE CLINIC | Facility: CLINIC | Age: 31
End: 2021-02-25

## 2021-02-25 DIAGNOSIS — F90.9 ATTENTION DEFICIT HYPERACTIVITY DISORDER (ADHD), UNSPECIFIED ADHD TYPE: Primary | ICD-10-CM

## 2021-02-25 NOTE — PROGRESS NOTES
Please let pt know results: uds positive for thc, ensure other scripts at pharmacy are cancelled, needs a 30 day clean uds to restart. Cannot use thc.

## 2021-02-25 NOTE — PROGRESS NOTES
Please let pt know results: xray of her knee looks good, no fracture or effusion. This pain may be radiculopathy from her back.

## 2021-02-25 NOTE — TELEPHONE ENCOUNTER
2/10 script had already been filled. Called and cancelled March prescription. Verbal - tacos from pharmacy.

## 2021-02-25 NOTE — TELEPHONE ENCOUNTER
PATIENT CALLED IN TO RETURN A MISSED CALL PATIENT STATES SHE LOST CALL   WARM TRANSFER FAILED    PLEASE CALL BACK AND ADVISE  [phone]

## 2021-02-25 NOTE — TELEPHONE ENCOUNTER
----- Message from VON Brar sent at 2/25/2021  7:08 AM CST -----  Please let pt know results: uds positive for thc, ensure other scripts at pharmacy are cancelled, needs a 30 day clean uds to restart. Cannot use thc.

## 2021-03-24 ENCOUNTER — LAB (OUTPATIENT)
Dept: LAB | Facility: HOSPITAL | Age: 31
End: 2021-03-24

## 2021-03-24 ENCOUNTER — TELEPHONE (OUTPATIENT)
Dept: FAMILY MEDICINE CLINIC | Facility: CLINIC | Age: 31
End: 2021-03-24

## 2021-03-24 DIAGNOSIS — F90.0 ATTENTION DEFICIT HYPERACTIVITY DISORDER (ADHD), PREDOMINANTLY INATTENTIVE TYPE: ICD-10-CM

## 2021-03-24 DIAGNOSIS — F90.9 ATTENTION DEFICIT HYPERACTIVITY DISORDER (ADHD), UNSPECIFIED ADHD TYPE: ICD-10-CM

## 2021-03-24 LAB
AMPHET+METHAMPHET UR QL: NEGATIVE
AMPHETAMINES UR QL: NEGATIVE
BARBITURATES UR QL SCN: NEGATIVE
BENZODIAZ UR QL SCN: NEGATIVE
BUPRENORPHINE SERPL-MCNC: NEGATIVE NG/ML
CANNABINOIDS SERPL QL: NEGATIVE
COCAINE UR QL: NEGATIVE
METHADONE UR QL SCN: NEGATIVE
OPIATES UR QL: NEGATIVE
OXYCODONE UR QL SCN: NEGATIVE
PCP UR QL SCN: NEGATIVE
PROPOXYPH UR QL: NEGATIVE
TRICYCLICS UR QL SCN: NEGATIVE

## 2021-03-24 PROCEDURE — 80306 DRUG TEST PRSMV INSTRMNT: CPT

## 2021-03-24 NOTE — TELEPHONE ENCOUNTER
Caller: Raquel Best    Relationship: Self    Best call back number: [phone]    Medication needed:   Requested Prescriptions     Pending Prescriptions Disp Refills   • amphetamine-dextroamphetamine XR (ADDERALL XR) 30 MG 24 hr capsule 30 capsule 0     Sig: Take 1 capsule by mouth Every Morning for 30 days     What is the patient's preferred pharmacy: [id number] - [place], [place] - [address] AT [facility] [address]  [phone] [facility] [phone] FX

## 2021-03-25 RX ORDER — DEXTROAMPHETAMINE SACCHARATE, AMPHETAMINE ASPARTATE MONOHYDRATE, DEXTROAMPHETAMINE SULFATE AND AMPHETAMINE SULFATE 7.5; 7.5; 7.5; 7.5 MG/1; MG/1; MG/1; MG/1
30 CAPSULE, EXTENDED RELEASE ORAL EVERY MORNING
Qty: 30 CAPSULE | Refills: 0 | Status: SHIPPED | OUTPATIENT
Start: 2021-03-25 | End: 2021-04-24

## 2021-03-26 NOTE — PROGRESS NOTES
Please let pt know results: ann clear, ok to refill her adderall xr for 2 more months then will need visit.

## 2021-04-21 DIAGNOSIS — F90.0 ATTENTION DEFICIT HYPERACTIVITY DISORDER (ADHD), PREDOMINANTLY INATTENTIVE TYPE: ICD-10-CM

## 2021-04-21 RX ORDER — DEXTROAMPHETAMINE SACCHARATE, AMPHETAMINE ASPARTATE MONOHYDRATE, DEXTROAMPHETAMINE SULFATE AND AMPHETAMINE SULFATE 7.5; 7.5; 7.5; 7.5 MG/1; MG/1; MG/1; MG/1
30 CAPSULE, EXTENDED RELEASE ORAL EVERY MORNING
Qty: 30 CAPSULE | Refills: 0 | Status: SHIPPED | OUTPATIENT
Start: 2021-04-21 | End: 2021-05-21

## 2021-04-21 NOTE — TELEPHONE ENCOUNTER
Caller: Raquel Best    Relationship: Self    Best call back number: [phone]    Medication needed:   Requested Prescriptions     Pending Prescriptions Disp Refills   • amphetamine-dextroamphetamine XR (Adderall XR) 30 MG 24 hr capsule 30 capsule 0     Sig: Take 1 capsule by mouth Every Morning for 30 days       When do you need the refill by: 04/21/21    What additional details did the patient provide when requesting the medication:     Does the patient have less than a 3 day supply:  [x] Yes  [] No    What is the patient's preferred pharmacy: [facility] - [address]  - [phone] [facility] [phone] FX

## 2021-04-30 ENCOUNTER — OFFICE VISIT (OUTPATIENT)
Dept: FAMILY MEDICINE CLINIC | Facility: CLINIC | Age: 31
End: 2021-04-30

## 2021-04-30 VITALS
TEMPERATURE: 98.2 F | OXYGEN SATURATION: 99 % | SYSTOLIC BLOOD PRESSURE: 128 MMHG | DIASTOLIC BLOOD PRESSURE: 85 MMHG | HEIGHT: 64 IN | BODY MASS INDEX: 25.44 KG/M2 | WEIGHT: 149 LBS | HEART RATE: 113 BPM

## 2021-04-30 DIAGNOSIS — F90.9 ATTENTION DEFICIT HYPERACTIVITY DISORDER (ADHD), UNSPECIFIED ADHD TYPE: Primary | ICD-10-CM

## 2021-04-30 DIAGNOSIS — F41.8 MIXED ANXIETY AND DEPRESSIVE DISORDER: ICD-10-CM

## 2021-04-30 DIAGNOSIS — F39 MOOD DISORDER (HCC): ICD-10-CM

## 2021-04-30 PROCEDURE — 99213 OFFICE O/P EST LOW 20 MIN: CPT | Performed by: NURSE PRACTITIONER

## 2021-04-30 RX ORDER — VALACYCLOVIR HYDROCHLORIDE 1 G/1
1 TABLET, FILM COATED ORAL DAILY PRN
COMMUNITY
Start: 2021-04-12 | End: 2022-04-11
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
## 2021-05-24 ENCOUNTER — TELEPHONE (OUTPATIENT)
Dept: FAMILY MEDICINE CLINIC | Facility: CLINIC | Age: 31
End: 2021-05-24

## 2021-05-24 NOTE — TELEPHONE ENCOUNTER
Attempted to call and Voicemail full. Pt needs three month follow up in office before refills. Hub may read.

## 2021-05-24 NOTE — TELEPHONE ENCOUNTER
Caller: Raquel Best    Relationship: Self    Best call back number: [phone] (M)    Medication needed:   ADERALL 30 MG     When do you need the refill by: TODAY     What additional details did the patient provide when requesting the medication: STATES SHE WAS NOT ABLE TO REQUEST REFILLS THIS PAST Saturday AND HAS LESS THAN A 3 DAYS SUPPLY     Does the patient have less than a 3 day supply:  [x] Yes  [] No    What is the patient's preferred pharmacy:    [facility] #[id number] - [place], [place] - [address] AT [facility] [address] - [phone] [facility] [phone]   [phone]

## 2021-05-25 NOTE — TELEPHONE ENCOUNTER
Pt contacted office back, verified name and . Advised pt of the below. Pt corrie. offered to make pt appt. Pt declined and stated she preferred to do a walk in.

## 2021-05-26 ENCOUNTER — OFFICE VISIT (OUTPATIENT)
Dept: FAMILY MEDICINE CLINIC | Facility: CLINIC | Age: 31
End: 2021-05-26

## 2021-05-26 VITALS
WEIGHT: 144 LBS | BODY MASS INDEX: 24.59 KG/M2 | SYSTOLIC BLOOD PRESSURE: 124 MMHG | DIASTOLIC BLOOD PRESSURE: 88 MMHG | RESPIRATION RATE: 20 BRPM | HEART RATE: 108 BPM | TEMPERATURE: 97 F | HEIGHT: 64 IN

## 2021-05-26 DIAGNOSIS — M54.50 ACUTE BILATERAL LOW BACK PAIN WITHOUT SCIATICA: ICD-10-CM

## 2021-05-26 DIAGNOSIS — F90.9 ATTENTION DEFICIT HYPERACTIVITY DISORDER (ADHD), UNSPECIFIED ADHD TYPE: Primary | ICD-10-CM

## 2021-05-26 DIAGNOSIS — F41.8 MIXED ANXIETY AND DEPRESSIVE DISORDER: ICD-10-CM

## 2021-05-26 DIAGNOSIS — F31.0 BIPOLAR AFFECTIVE DISORDER, CURRENT EPISODE HYPOMANIC (HCC): ICD-10-CM

## 2021-05-26 PROCEDURE — 99214 OFFICE O/P EST MOD 30 MIN: CPT | Performed by: NURSE PRACTITIONER

## 2021-05-26 RX ORDER — IBUPROFEN 800 MG/1
800 TABLET ORAL EVERY 6 HOURS PRN
Qty: 30 TABLET | Refills: 0 | Status: SHIPPED | OUTPATIENT
Start: 2021-05-26 | End: 2022-01-18

## 2021-05-26 RX ORDER — LAMOTRIGINE 25 MG/1
50 TABLET ORAL DAILY
Qty: 60 TABLET | Refills: 2 | Status: SHIPPED | OUTPATIENT
Start: 2021-05-26 | End: 2021-06-09 | Stop reason: SDUPTHER

## 2021-05-26 RX ORDER — TIZANIDINE HYDROCHLORIDE 4 MG/1
4 CAPSULE, GELATIN COATED ORAL NIGHTLY PRN
Qty: 20 CAPSULE | Refills: 0 | Status: SHIPPED | OUTPATIENT
Start: 2021-05-26 | End: 2022-01-18

## 2021-05-26 RX ORDER — VENLAFAXINE HYDROCHLORIDE 150 MG/1
150 CAPSULE, EXTENDED RELEASE ORAL EVERY MORNING
Qty: 30 CAPSULE | Refills: 2 | Status: SHIPPED | OUTPATIENT
Start: 2021-05-26 | End: 2021-06-09 | Stop reason: SDUPTHER

## 2021-05-26 RX ORDER — DEXTROAMPHETAMINE SACCHARATE, AMPHETAMINE ASPARTATE MONOHYDRATE, DEXTROAMPHETAMINE SULFATE AND AMPHETAMINE SULFATE 7.5; 7.5; 7.5; 7.5 MG/1; MG/1; MG/1; MG/1
30 CAPSULE, EXTENDED RELEASE ORAL EVERY MORNING
COMMUNITY
End: 2021-05-26
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
## 2021-06-09 ENCOUNTER — OFFICE VISIT (OUTPATIENT)
Dept: FAMILY MEDICINE CLINIC | Facility: CLINIC | Age: 31
End: 2021-06-09

## 2021-06-09 VITALS
SYSTOLIC BLOOD PRESSURE: 123 MMHG | HEART RATE: 110 BPM | RESPIRATION RATE: 20 BRPM | BODY MASS INDEX: 24.92 KG/M2 | DIASTOLIC BLOOD PRESSURE: 89 MMHG | HEIGHT: 64 IN | WEIGHT: 146 LBS

## 2021-06-09 DIAGNOSIS — F90.9 ATTENTION DEFICIT HYPERACTIVITY DISORDER (ADHD), UNSPECIFIED ADHD TYPE: Primary | ICD-10-CM

## 2021-06-09 DIAGNOSIS — F41.8 MIXED ANXIETY AND DEPRESSIVE DISORDER: ICD-10-CM

## 2021-06-09 DIAGNOSIS — F31.60 BIPOLAR DISORDER, MIXED (HCC): Primary | ICD-10-CM

## 2021-06-09 DIAGNOSIS — G43.009 MIGRAINE WITHOUT AURA AND WITHOUT STATUS MIGRAINOSUS, NOT INTRACTABLE: ICD-10-CM

## 2021-06-09 DIAGNOSIS — F90.9 ATTENTION DEFICIT HYPERACTIVITY DISORDER (ADHD), UNSPECIFIED ADHD TYPE: ICD-10-CM

## 2021-06-09 PROCEDURE — 99214 OFFICE O/P EST MOD 30 MIN: CPT | Performed by: NURSE PRACTITIONER

## 2021-06-09 RX ORDER — VENLAFAXINE HYDROCHLORIDE 150 MG/1
150 CAPSULE, EXTENDED RELEASE ORAL EVERY MORNING
Qty: 30 CAPSULE | Refills: 1 | Status: SHIPPED | OUTPATIENT
Start: 2021-06-09 | End: 2022-01-18

## 2021-06-09 RX ORDER — DEXTROAMPHETAMINE SACCHARATE, AMPHETAMINE ASPARTATE MONOHYDRATE, DEXTROAMPHETAMINE SULFATE AND AMPHETAMINE SULFATE 6.25; 6.25; 6.25; 6.25 MG/1; MG/1; MG/1; MG/1
25 CAPSULE, EXTENDED RELEASE ORAL EVERY MORNING
Qty: 30 CAPSULE | Refills: 0 | Status: SHIPPED | OUTPATIENT
Start: 2021-06-09 | End: 2021-07-08

## 2021-06-09 RX ORDER — SUMATRIPTAN 50 MG/1
50 TABLET, FILM COATED ORAL DAILY PRN
Qty: 9 TABLET | Refills: 2 | Status: SHIPPED | OUTPATIENT
Start: 2021-06-09 | End: 2022-01-18

## 2021-06-09 RX ORDER — LAMOTRIGINE 25 MG/1
50 TABLET ORAL DAILY
Qty: 60 TABLET | Refills: 1 | Status: SHIPPED | OUTPATIENT
Start: 2021-06-09 | End: 2022-01-18

## 2021-06-09 RX ORDER — HYDROXYZINE PAMOATE 50 MG/1
50 CAPSULE ORAL 3 TIMES DAILY PRN
Qty: 90 CAPSULE | Refills: 1 | Status: SHIPPED | OUTPATIENT
Start: 2021-06-09 | End: 2022-01-18

## 2021-06-09 NOTE — PROGRESS NOTES
"Chief Complaint  ADD (Pt states that she is wanting to start back on her adhd medication. Pt states that she is doing well on her Vraylar. )    Subjective    History of Present Illness      Patient presents to [facility] for   ADHD/Mood HPI    Visit for:  follow-up. Most recent visit was 1 month ago.  Interim changes to follow up on today: new medication: Vraylar  Work/School Performance:  struggling  Cognitive:  unable to focus    Behavior  Hyperactivity: is not hyperactive  Impulsivity: no impulsivity and no unsafe behavior  Tasking: difficulty initiating tasks and difficulty completing tasks    Social  ADHD social/impulsive symptoms:  not impatient    Behavioral health  Behavior: no concerns  Emotional coping: demonstrates feelings of no concerns        ADD  This is a chronic problem.        Review of Systems   Psychiatric/Behavioral: Positive for decreased concentration. The patient is nervous/anxious.    All other systems reviewed and are negative.      I have reviewed and agree with the HPI and ROS information as above.  Joan Palm, APRN     Objective   Vital Signs:   /89   Pulse 110   Resp 20   Ht 162.6 cm (64\")   Wt 66.2 kg (146 lb)   BMI 25.06 kg/m²       Physical Exam  Constitutional:       Appearance: Normal appearance. She is well-developed.   HENT:      Head: Normocephalic and atraumatic.      Right Ear: Tympanic membrane, ear canal and external ear normal.      Left Ear: Tympanic membrane, ear canal and external ear normal.      Nose: Nose normal. No septal deviation, nasal tenderness or congestion.      Mouth/Throat:      Lips: Pink. No lesions.      Mouth: Mucous membranes are moist. No oral lesions.      Dentition: Normal dentition.      Pharynx: Oropharynx is clear. No pharyngeal swelling, oropharyngeal exudate or posterior oropharyngeal erythema.   Eyes:      General: Lids are normal. Vision grossly intact. No scleral icterus.        Right eye: No " discharge.         Left eye: No discharge.      Extraocular Movements: Extraocular movements intact.      Conjunctiva/sclera: Conjunctivae normal.      Right eye: Right conjunctiva is not injected.      Left eye: Left conjunctiva is not injected.      Pupils: Pupils are equal, round, and reactive to light.   Neck:      Thyroid: No thyroid mass.      Trachea: Trachea normal.   Cardiovascular:      Rate and Rhythm: Normal rate and regular rhythm.      Heart sounds: Normal heart sounds. No murmur heard.   No gallop.    Pulmonary:      Effort: Pulmonary effort is normal.      Breath sounds: Normal breath sounds and air entry. No wheezing, rhonchi or rales.   Abdominal:      General: There is no distension.      Palpations: Abdomen is soft. There is no mass.      Tenderness: There is no abdominal tenderness. There is no right CVA tenderness, left CVA tenderness, guarding or rebound.   Musculoskeletal:         General: No tenderness or deformity. Normal range of motion.      Cervical back: Full passive range of motion without pain, normal range of motion and neck supple.      Thoracic back: Normal.      Right lower leg: No edema.      Left lower leg: No edema.   Skin:     General: Skin is warm and dry.      Coloration: Skin is not jaundiced.      Findings: No rash.   Neurological:      Mental Status: She is alert and oriented to person, place, and time.      Cranial Nerves: Cranial nerves are intact.      Sensory: Sensation is intact.      Motor: Motor function is intact.      Coordination: Coordination is intact.      Gait: Gait is intact.      Deep Tendon Reflexes: Reflexes are normal and symmetric.   Psychiatric:         Mood and Affect: Affect normal. Mood is anxious.         Judgment: Judgment normal.          Result Review  Data Reviewed:          Urine Drug Screen - Urine, Clean Catch (03/24/2021 15:46)           Assessment and Plan      Problem List Items Addressed This Visit        Mental Health    Attention  deficit hyperactivity disorder (ADHD)    Relevant Medications    Cariprazine HCl (Vraylar) 1.5 MG capsule capsule    venlafaxine XR (EFFEXOR-XR) 150 MG 24 hr capsule    hydrOXYzine pamoate (Vistaril) 50 MG capsule    Mixed anxiety and depressive disorder    Relevant Medications    Cariprazine HCl (Vraylar) 1.5 MG capsule capsule    venlafaxine XR (EFFEXOR-XR) 150 MG 24 hr capsule    hydrOXYzine pamoate (Vistaril) 50 MG capsule       Neuro    Migraine without aura and without status migrainosus, not intractable    Relevant Medications    lamoTRIgine (LaMICtal) 25 MG tablet    SUMAtriptan (IMITREX) 50 MG tablet    venlafaxine XR (EFFEXOR-XR) 150 MG 24 hr capsule      Other Visit Diagnoses     Bipolar disorder, mixed (CMS/HCC)    -  Primary    Relevant Medications    Cariprazine HCl (Vraylar) 1.5 MG capsule capsule    lamoTRIgine (LaMICtal) 25 MG tablet    venlafaxine XR (EFFEXOR-XR) 150 MG 24 hr capsule    hydrOXYzine pamoate (Vistaril) 50 MG capsule      Patient presents for adhd and mood follow up. Reports significant improvement since starting vraylar last visit. Trouble concentrating and some anxiety still present. Plan:   1. Mood is stable on vraylar, doing very well since starting this 2 weeks ago. Continue same. Denies HI, SI. Had initial side effects but these improved/resolved.  2. Anxiety is still a problem. Add vistaril prn use.   3. Restart adhd treatment, lower dose. F/u 1 month.   4. Refill Imitrex.         Follow Up   Return in about 1 month (around 7/9/2021).  Patient was given instructions and counseling regarding her condition or for health maintenance advice. Please see specific information pulled into the AVS if appropriate.

## 2021-06-09 NOTE — PATIENT INSTRUCTIONS
Discharge Instructions - Mood Disorder Follow Up     - You will need to return to the office as instructed for follow up, or sooner if you develop symptoms  - Medication should be taken first thing in the morning  - It is your responsibility to safe guard your medication  - If you develop any symptoms such as headache, changes in weight, loss of appetite, chest pain, palpitations, or change in behavior, please contact our office immediately or go to your local emergency rooms for any emergent needs.  - Your next appointment will be walk in visit.  Dr. Darby is here Monday-Thursdays, and you will need to be signed in by 3 pm in order to guarantee your prescription is filled that day.  You may be seen on Fridays or Saturdays for medication follow up as well.      Discharge Instructions - ADHD Follow Up    - You will need to return to the office as instructed for follow up, or sooner if you develop symptoms.  - Medication should be taken first thing in the morning.  - It is your responsibility to safe guard your medication. Any medication that is lost or stolen may not be renewed until your next scheduled appointment.  - If you develop any symptoms such as headache, weight loss, loss of appetite, chest pain, palpitations, or change in behavior, please contact our office immediately or go to your local emergency rooms for any emergent needs.  - Your next appointment will be a walk in visit. Dr Darby is here Monday-Thursdays, and you will need to be signed in by 3 pm in order to guarantee your prescription is filled that day. You may be seen on Fridays or Saturdays for medication follow up as well, but your prescription will not be ready for  until the following Monday.

## 2021-06-25 ENCOUNTER — OFFICE VISIT (OUTPATIENT)
Dept: FAMILY MEDICINE CLINIC | Facility: CLINIC | Age: 31
End: 2021-06-25

## 2021-06-25 ENCOUNTER — HOSPITAL ENCOUNTER (OUTPATIENT)
Dept: ULTRASOUND IMAGING | Facility: HOSPITAL | Age: 31
Discharge: HOME | End: 2021-06-25
Admitting: NURSE PRACTITIONER

## 2021-06-25 VITALS
WEIGHT: 145 LBS | TEMPERATURE: 96.8 F | SYSTOLIC BLOOD PRESSURE: 116 MMHG | DIASTOLIC BLOOD PRESSURE: 78 MMHG | OXYGEN SATURATION: 9 % | RESPIRATION RATE: 20 BRPM | HEIGHT: 64 IN | BODY MASS INDEX: 24.75 KG/M2 | HEART RATE: 108 BPM

## 2021-06-25 DIAGNOSIS — R20.0 NUMBNESS OF FINGERS: ICD-10-CM

## 2021-06-25 DIAGNOSIS — R20.0 PAIN AND NUMBNESS OF LEFT UPPER EXTREMITY: ICD-10-CM

## 2021-06-25 DIAGNOSIS — R22.32 LOCALIZED SWELLING ON LEFT HAND: Primary | ICD-10-CM

## 2021-06-25 DIAGNOSIS — R22.32 LOCALIZED SWELLING ON LEFT HAND: ICD-10-CM

## 2021-06-25 DIAGNOSIS — M79.602 PAIN AND NUMBNESS OF LEFT UPPER EXTREMITY: ICD-10-CM

## 2021-06-25 PROBLEM — D68.51 FACTOR V LEIDEN: Status: ACTIVE | Noted: 2021-06-25

## 2021-06-25 PROCEDURE — 93971 EXTREMITY STUDY: CPT

## 2021-06-25 PROCEDURE — 99214 OFFICE O/P EST MOD 30 MIN: CPT | Performed by: NURSE PRACTITIONER

## 2021-06-25 RX ORDER — IMIQUIMOD 12.5 MG/.25G
CREAM TOPICAL
COMMUNITY
Start: 2021-06-18 | End: 2022-01-18

## 2021-06-25 NOTE — PROGRESS NOTES
"Chief Complaint  Upper Extremity Issue (L hand swelling), Numbness (L ), and Hand Pain (L)    Subjective    History of Present Illness      Patient presents to [facility] for   Pt being seen today c/o L hand swelling, numbness, and pain x 3 weeks. Pt denies any injury to extremeity that she is aware of. Pt states she has been taking tizanidine, ibuprofen OTC, and ice. Pt states none of this has helped.     Upper Extremity Issue  This is a new problem. The current episode started 1 to 4 weeks ago. The problem has been gradually worsening. Associated symptoms include numbness. The symptoms are aggravated by stress and exertion. She has tried acetaminophen and ice (tizanidine) for the symptoms. The treatment provided no relief.   Hand Pain   The incident occurred more than 1 week ago. The pain is present in the left hand. Quality: pinching. The pain is at a severity of 5/10. The pain is severe. Associated symptoms include numbness and tingling. The symptoms are aggravated by movement and lifting. She has tried ice and NSAIDs (tizanidine) for the symptoms. The treatment provided no relief.        Review of Systems   Constitutional: Negative.    HENT: Negative.    Eyes: Negative.    Respiratory: Negative.    Cardiovascular: Negative.    Gastrointestinal: Negative.    Endocrine: Negative.    Genitourinary: Negative.    Skin: Negative.    Allergic/Immunologic: Negative.    Neurological: Positive for tingling and numbness.   Hematological: Negative.    Psychiatric/Behavioral: Negative.        I have reviewed and agree with the HPI and ROS information as above.  Estrella Laird, APRN     Objective   Vital Signs:   /78   Pulse 108   Temp 96.8 °F (36 °C)   Resp 20   Ht 162.6 cm (64\")   Wt 65.8 kg (145 lb)   SpO2 (!) 9%   BMI 24.89 kg/m²       Physical Exam  Constitutional:       Appearance: Normal appearance. She is well-developed.   HENT:      Head: Normocephalic and atraumatic. "      Right Ear: External ear normal.      Left Ear: External ear normal.      Nose: Nose normal. No nasal tenderness or congestion.      Mouth/Throat:      Lips: Pink. No lesions.      Mouth: Mucous membranes are moist. No oral lesions.      Dentition: Normal dentition.      Pharynx: Oropharynx is clear. No pharyngeal swelling, oropharyngeal exudate or posterior oropharyngeal erythema.   Eyes:      General: Lids are normal. Vision grossly intact. No scleral icterus.        Right eye: No discharge.         Left eye: No discharge.      Extraocular Movements: Extraocular movements intact.      Conjunctiva/sclera: Conjunctivae normal.      Right eye: Right conjunctiva is not injected.      Left eye: Left conjunctiva is not injected.      Pupils: Pupils are equal, round, and reactive to light.   Cardiovascular:      Rate and Rhythm: Normal rate and regular rhythm.      Heart sounds: Normal heart sounds. No murmur heard.   No gallop.    Pulmonary:      Effort: Pulmonary effort is normal.      Breath sounds: Normal breath sounds and air entry. No wheezing, rhonchi or rales.   Musculoskeletal:         General: Tenderness present. No deformity. Normal range of motion.      Left hand: Swelling and tenderness present. Decreased sensation. Normal pulse.      Cervical back: Full passive range of motion without pain, normal range of motion and neck supple.      Comments: Positive Phalens test.   Skin:     General: Skin is warm and dry.      Coloration: Skin is not jaundiced.      Findings: No rash.   Neurological:      Mental Status: She is alert and oriented to person, place, and time.      Cranial Nerves: Cranial nerves are intact.      Sensory: Sensation is intact.      Motor: Motor function is intact.      Coordination: Coordination is intact.      Gait: Gait is intact.   Psychiatric:         Attention and Perception: Attention normal.         Mood and Affect: Mood and affect normal.         Behavior: Behavior is not  hyperactive. Behavior is cooperative.         Thought Content: Thought content normal.         Judgment: Judgment normal.          Result Review  Data Reviewed:                   Assessment and Plan      Problem List Items Addressed This Visit     None      Visit Diagnoses     Localized swelling on left hand    -  Primary    Pain and numbness of left upper extremity        Relevant Orders    Nerve Conduction Test    Numbness of fingers          Pts symptoms consistent with Carpal Tunnel Syndrome. We will order a NCS and follow-up after results of test. In the meantime recommended cock-up wrist splint.OTC NSAIDs prn for pain.         Follow Up   No follow-ups on file.  Patient was given instructions and counseling regarding her condition or for health maintenance advice. Please see specific information pulled into the AVS if appropriate.

## 2021-07-08 ENCOUNTER — OFFICE VISIT (OUTPATIENT)
Dept: FAMILY MEDICINE CLINIC | Facility: CLINIC | Age: 31
End: 2021-07-08

## 2021-07-08 VITALS
SYSTOLIC BLOOD PRESSURE: 102 MMHG | HEART RATE: 113 BPM | RESPIRATION RATE: 18 BRPM | BODY MASS INDEX: 24.72 KG/M2 | WEIGHT: 144.8 LBS | TEMPERATURE: 97.1 F | OXYGEN SATURATION: 98 % | HEIGHT: 64 IN | DIASTOLIC BLOOD PRESSURE: 56 MMHG

## 2021-07-08 DIAGNOSIS — F90.9 ATTENTION DEFICIT HYPERACTIVITY DISORDER (ADHD), UNSPECIFIED ADHD TYPE: Primary | ICD-10-CM

## 2021-07-08 DIAGNOSIS — F90.2 ATTENTION DEFICIT HYPERACTIVITY DISORDER (ADHD), COMBINED TYPE: Primary | ICD-10-CM

## 2021-07-08 DIAGNOSIS — F41.8 MIXED ANXIETY AND DEPRESSIVE DISORDER: ICD-10-CM

## 2021-07-08 DIAGNOSIS — F39 MOOD DISORDER (HCC): ICD-10-CM

## 2021-07-08 PROCEDURE — 99214 OFFICE O/P EST MOD 30 MIN: CPT | Performed by: NURSE PRACTITIONER

## 2021-07-08 RX ORDER — DEXTROAMPHETAMINE SACCHARATE, AMPHETAMINE ASPARTATE MONOHYDRATE, DEXTROAMPHETAMINE SULFATE AND AMPHETAMINE SULFATE 7.5; 7.5; 7.5; 7.5 MG/1; MG/1; MG/1; MG/1
30 CAPSULE, EXTENDED RELEASE ORAL EVERY MORNING
Qty: 30 CAPSULE | Refills: 0 | Status: SHIPPED | OUTPATIENT
Start: 2021-07-08 | End: 2021-08-07

## 2021-07-08 RX ORDER — DEXTROAMPHETAMINE SACCHARATE, AMPHETAMINE ASPARTATE MONOHYDRATE, DEXTROAMPHETAMINE SULFATE AND AMPHETAMINE SULFATE 7.5; 7.5; 7.5; 7.5 MG/1; MG/1; MG/1; MG/1
30 CAPSULE, EXTENDED RELEASE ORAL EVERY MORNING
Qty: 30 CAPSULE | Refills: 0 | Status: SHIPPED | OUTPATIENT
Start: 2021-08-05 | End: 2021-09-04

## 2021-07-08 NOTE — PROGRESS NOTES
"Chief Complaint  ADHD (Followup on ADHD with medication refill)    Subjective          Raquel Best presents to [facility]  Patient presents today for ADHD followup with medication refill on Adderall.  Pt states ,medication is working well.        Objective   Vital Signs:   /56 (BP Location: Right arm, Patient Position: Sitting, Cuff Size: Adult)   Pulse 113   Temp 97.1 °F (36.2 °C) (Infrared)   Resp 18   Ht 162.6 cm (64\")   Wt 65.7 kg (144 lb 12.8 oz)   SpO2 98%   BMI 24.85 kg/m²     Physical Exam  Vitals and nursing note reviewed.   Constitutional:       Appearance: Normal appearance. She is well-developed.   HENT:      Head: Normocephalic and atraumatic.      Right Ear: Tympanic membrane, ear canal and external ear normal.      Left Ear: Tympanic membrane, ear canal and external ear normal.      Nose: Nose normal. No septal deviation, nasal tenderness or congestion.      Mouth/Throat:      Lips: Pink. No lesions.      Mouth: Mucous membranes are moist. No oral lesions.      Dentition: Normal dentition.      Pharynx: Oropharynx is clear. No pharyngeal swelling, oropharyngeal exudate or posterior oropharyngeal erythema.   Eyes:      General: Lids are normal. Vision grossly intact. No scleral icterus.        Right eye: No discharge.         Left eye: No discharge.      Extraocular Movements: Extraocular movements intact.      Conjunctiva/sclera: Conjunctivae normal.      Right eye: Right conjunctiva is not injected.      Left eye: Left conjunctiva is not injected.      Pupils: Pupils are equal, round, and reactive to light.   Neck:      Thyroid: No thyroid mass.      Trachea: Trachea normal.   Cardiovascular:      Rate and Rhythm: Normal rate and regular rhythm.      Heart sounds: Normal heart sounds. No murmur heard.   No gallop.    Pulmonary:      Effort: Pulmonary effort is normal.      Breath sounds: Normal breath sounds and air entry. No wheezing, rhonchi or rales. "   Musculoskeletal:         General: No tenderness or deformity. Normal range of motion.      Cervical back: Full passive range of motion without pain, normal range of motion and neck supple.      Thoracic back: Normal.      Right lower leg: No edema.      Left lower leg: No edema.   Skin:     General: Skin is warm and dry.      Coloration: Skin is not jaundiced.      Findings: No rash.   Neurological:      Mental Status: She is alert and oriented to person, place, and time.      Cranial Nerves: Cranial nerves are intact.      Sensory: Sensation is intact.      Motor: Motor function is intact.      Coordination: Coordination is intact.      Gait: Gait is intact.      Deep Tendon Reflexes: Reflexes are normal and symmetric.   Psychiatric:         Mood and Affect: Mood and affect normal.         Behavior: Behavior normal.         Judgment: Judgment normal.             Assessment and Plan    Diagnoses and all orders for this visit:    1. Attention deficit hyperactivity disorder (ADHD), unspecified ADHD type (Primary)  Assessment & Plan:  Patient states that she is going to go back up on her Adderall dose.  She states that she was in a manic episode for a couple weeks in which of the is started Vraylar.  She states that the increase dopamine caused her to be sick and she stopped the medication.  She states that she was able to manage her symptoms and is no longer in a manic episode.  She states that she is doing really well.  Denies any other concerns or complaints at this time.  Urine drug screen is up-to-date and appropriate.  Will carefully increase Adderall back to the original dose.  Patient states that she just had her other medications refilled and did not does not need refills at this time.  She will call when she needs these.      2. Mood disorder (CMS/[facility])    3. Mixed anxiety and depressive disorder  ADHD Follow up:    Merlin sher initiated in office and is clean. ADRS (Adult ADHD Assessment Form) reviewed in  detail, scanned in chart, and has been reviewed at time of appointment.  All questions, including medication and side effects, were discussed in detail at time of patient's visit. Patient will continue same medication which was discussed at today's visit. Patient is to return in 3 month(s).      Follow Up   Return in about 3 months (around 10/8/2021).  Patient was given instructions and counseling regarding her condition or for health maintenance advice. Please see specific information pulled into the AVS if appropriate.

## 2021-07-08 NOTE — ASSESSMENT & PLAN NOTE
Patient states that she is going to go back up on her Adderall dose.  She states that she was in a manic episode for a couple weeks in which of the is started Vraylar.  She states that the increase dopamine caused her to be sick and she stopped the medication.  She states that she was able to manage her symptoms and is no longer in a manic episode.  She states that she is doing really well.  Denies any other concerns or complaints at this time.  Urine drug screen is up-to-date and appropriate.  Will carefully increase Adderall back to the original dose.  Patient states that she just had her other medications refilled and did not does not need refills at this time.  She will call when she needs these.

## 2021-07-16 ENCOUNTER — TELEPHONE (OUTPATIENT)
Dept: FAMILY MEDICINE CLINIC | Facility: CLINIC | Age: 31
End: 2021-07-16

## 2021-07-16 DIAGNOSIS — M79.602 PAIN AND NUMBNESS OF LEFT UPPER EXTREMITY: Primary | ICD-10-CM

## 2021-07-16 DIAGNOSIS — R20.0 PAIN AND NUMBNESS OF LEFT UPPER EXTREMITY: Primary | ICD-10-CM

## 2021-07-16 NOTE — TELEPHONE ENCOUNTER
Received message from scheduling, [facility] doesn't do plain nerve conduction test.  Needs to be ordered as EMG/Nerve conduction.  New order placed.

## 2021-08-05 DIAGNOSIS — F90.2 ATTENTION DEFICIT HYPERACTIVITY DISORDER (ADHD), COMBINED TYPE: ICD-10-CM

## 2021-08-05 NOTE — TELEPHONE ENCOUNTER
Caller: Raquel Best    Relationship: Self    Best call back number: [phone]    Medication needed:   Requested Prescriptions     Pending Prescriptions Disp Refills   • amphetamine-dextroamphetamine XR (ADDERALL XR) 30 MG 24 hr capsule 30 capsule 0     Sig: Take 1 capsule by mouth Every Morning for 30 days       When do you need the refill by: ASAP      Does the patient have less than a 3 day supply:  [x] Yes  [] No    What is the patient's preferred pharmacy: [facility] - [address]  - [phone] [facility] [phone] FX

## 2021-08-06 RX ORDER — DEXTROAMPHETAMINE SACCHARATE, AMPHETAMINE ASPARTATE MONOHYDRATE, DEXTROAMPHETAMINE SULFATE AND AMPHETAMINE SULFATE 7.5; 7.5; 7.5; 7.5 MG/1; MG/1; MG/1; MG/1
30 CAPSULE, EXTENDED RELEASE ORAL EVERY MORNING
Qty: 30 CAPSULE | Refills: 0 | OUTPATIENT
Start: 2021-08-06 | End: 2021-09-05

## 2021-09-09 DIAGNOSIS — F90.2 ATTENTION DEFICIT HYPERACTIVITY DISORDER (ADHD), COMBINED TYPE: Primary | ICD-10-CM

## 2021-09-09 RX ORDER — DEXTROAMPHETAMINE SACCHARATE, AMPHETAMINE ASPARTATE MONOHYDRATE, DEXTROAMPHETAMINE SULFATE AND AMPHETAMINE SULFATE 7.5; 7.5; 7.5; 7.5 MG/1; MG/1; MG/1; MG/1
30 CAPSULE, EXTENDED RELEASE ORAL EVERY MORNING
Qty: 30 CAPSULE | Refills: 0 | Status: SHIPPED | OUTPATIENT
Start: 2021-09-09 | End: 2021-10-09

## 2021-09-09 NOTE — TELEPHONE ENCOUNTER
PATIENT CALLED NEEDING HER ADDERALL 30MG SENT IN,    GOOD CALL BACK [phone]    PHARMACY [id number] - [place], [place] - [address] AT [facility] OF [address] - [phone]  - [phone] FX

## 2021-09-10 DIAGNOSIS — F31.60 BIPOLAR DISORDER, MIXED (HCC): ICD-10-CM

## 2021-09-10 RX ORDER — LAMOTRIGINE 25 MG/1
50 TABLET ORAL DAILY
Qty: 60 TABLET | Refills: 1 | OUTPATIENT
Start: 2021-09-10

## 2021-09-27 LAB
ABO, EXTERNAL RESULT: NORMAL
C. TRACHOMATIS, EXTERNAL RESULT: NEGATIVE
HEP B, EXTERNAL RESULT: NEGATIVE
HEPATITIS C ANTIBODY, EXTERNAL RESULT: NEGATIVE
HIV, EXTERNAL RESULT: NEGATIVE
N. GONORRHOEAE, EXTERNAL RESULT: NEGATIVE
RH FACTOR, EXTERNAL RESULT: POSITIVE
RPR, EXTERNAL RESULT: NONREACTIVE
RUBELLA TITER, EXTERNAL RESULT: NORMAL

## 2022-01-13 ENCOUNTER — HOSPITAL ENCOUNTER (OUTPATIENT)
Age: 32
Discharge: HOME | End: 2022-01-13
Attending: OBSTETRICS & GYNECOLOGY | Admitting: OBSTETRICS & GYNECOLOGY
Payer: COMMERCIAL

## 2022-01-13 VITALS
DIASTOLIC BLOOD PRESSURE: 77 MMHG | HEART RATE: 111 BPM | SYSTOLIC BLOOD PRESSURE: 116 MMHG | TEMPERATURE: 97.3 F | RESPIRATION RATE: 16 BRPM

## 2022-01-13 PROBLEM — D64.9 ANEMIA: Status: ACTIVE | Noted: 2022-01-13

## 2022-01-13 PROCEDURE — 6360000002 HC RX W HCPCS: Performed by: OBSTETRICS & GYNECOLOGY

## 2022-01-13 PROCEDURE — 2580000003 HC RX 258: Performed by: OBSTETRICS & GYNECOLOGY

## 2022-01-13 PROCEDURE — 96365 THER/PROPH/DIAG IV INF INIT: CPT

## 2022-01-13 PROCEDURE — 99211 OFF/OP EST MAY X REQ PHY/QHP: CPT

## 2022-01-13 RX ORDER — ONDANSETRON 4 MG/1
4 TABLET, ORALLY DISINTEGRATING ORAL EVERY 8 HOURS PRN
Status: DISCONTINUED | OUTPATIENT
Start: 2022-01-13 | End: 2022-01-13 | Stop reason: HOSPADM

## 2022-01-13 RX ORDER — SODIUM CHLORIDE 9 MG/ML
25 INJECTION, SOLUTION INTRAVENOUS PRN
Status: DISCONTINUED | OUTPATIENT
Start: 2022-01-13 | End: 2022-01-13 | Stop reason: HOSPADM

## 2022-01-13 RX ORDER — ACETAMINOPHEN 325 MG/1
650 TABLET ORAL EVERY 4 HOURS PRN
Status: DISCONTINUED | OUTPATIENT
Start: 2022-01-13 | End: 2022-01-13 | Stop reason: HOSPADM

## 2022-01-13 RX ORDER — ONDANSETRON 2 MG/ML
4 INJECTION INTRAMUSCULAR; INTRAVENOUS EVERY 6 HOURS PRN
Status: DISCONTINUED | OUTPATIENT
Start: 2022-01-13 | End: 2022-01-13 | Stop reason: HOSPADM

## 2022-01-13 RX ORDER — SODIUM CHLORIDE 0.9 % (FLUSH) 0.9 %
5-40 SYRINGE (ML) INJECTION EVERY 12 HOURS SCHEDULED
Status: DISCONTINUED | OUTPATIENT
Start: 2022-01-13 | End: 2022-01-13 | Stop reason: HOSPADM

## 2022-01-13 RX ORDER — SODIUM CHLORIDE 0.9 % (FLUSH) 0.9 %
5-40 SYRINGE (ML) INJECTION PRN
Status: DISCONTINUED | OUTPATIENT
Start: 2022-01-13 | End: 2022-01-13 | Stop reason: HOSPADM

## 2022-01-13 RX ADMIN — FERRIC CARBOXYMALTOSE INJECTION 750 MG: 50 INJECTION, SOLUTION INTRAVENOUS at 18:43

## 2022-01-18 ENCOUNTER — TELEPHONE (OUTPATIENT)
Dept: FAMILY MEDICINE CLINIC | Facility: CLINIC | Age: 32
End: 2022-01-18

## 2022-01-18 ENCOUNTER — OFFICE VISIT (OUTPATIENT)
Dept: FAMILY MEDICINE CLINIC | Facility: CLINIC | Age: 32
End: 2022-01-18

## 2022-01-18 VITALS
DIASTOLIC BLOOD PRESSURE: 69 MMHG | HEIGHT: 64 IN | SYSTOLIC BLOOD PRESSURE: 110 MMHG | WEIGHT: 156.6 LBS | RESPIRATION RATE: 18 BRPM | HEART RATE: 100 BPM | TEMPERATURE: 98.2 F | OXYGEN SATURATION: 100 % | BODY MASS INDEX: 26.73 KG/M2

## 2022-01-18 DIAGNOSIS — Z3A.30 30 WEEKS GESTATION OF PREGNANCY: ICD-10-CM

## 2022-01-18 DIAGNOSIS — F39 MOOD DISORDER: Primary | ICD-10-CM

## 2022-01-18 DIAGNOSIS — F41.8 MIXED ANXIETY AND DEPRESSIVE DISORDER: ICD-10-CM

## 2022-01-18 PROCEDURE — 99214 OFFICE O/P EST MOD 30 MIN: CPT

## 2022-01-18 RX ORDER — LURASIDONE HYDROCHLORIDE 20 MG/1
20 TABLET, FILM COATED ORAL DAILY
Qty: 30 TABLET | Refills: 0 | Status: SHIPPED | OUTPATIENT
Start: 2022-01-18 | End: 2022-03-21

## 2022-01-18 RX ORDER — BUSPIRONE HYDROCHLORIDE 5 MG/1
10 TABLET ORAL 3 TIMES DAILY
Qty: 60 TABLET | Refills: 0 | Status: SHIPPED | OUTPATIENT
Start: 2022-01-18 | End: 2022-04-11

## 2022-01-18 NOTE — PROGRESS NOTES
"Chief Complaint  Depression (Patient would like to discuss getting back on depression and ADHD medication.)    Subjective    History of Present Illness      Patient presents to [facility] for   Patient would like to discuss getting back on depression and ADHD medication.       Review of Systems   Psychiatric/Behavioral: Positive for depressed mood. The patient is nervous/anxious.    All other systems reviewed and are negative.      I have reviewed and agree with the HPI and ROS information as above.  Meghan Harrell, APRN     Objective   Vital Signs:   /69 (BP Location: Right arm, Patient Position: Sitting)   Pulse 100   Temp 98.2 °F (36.8 °C)   Resp 18   Ht 162.6 cm (64\")   Wt 71 kg (156 lb 9.6 oz)   SpO2 100%   BMI 26.88 kg/m²       Physical Exam  Constitutional:       Appearance: Normal appearance. She is well-developed.   HENT:      Head: Normocephalic and atraumatic.      Right Ear: Tympanic membrane, ear canal and external ear normal.      Left Ear: Tympanic membrane, ear canal and external ear normal.      Nose: Nose normal. No septal deviation, nasal tenderness or congestion.      Mouth/Throat:      Lips: Pink. No lesions.      Mouth: Mucous membranes are moist. No oral lesions.      Dentition: Normal dentition.      Pharynx: Oropharynx is clear. No pharyngeal swelling, oropharyngeal exudate or posterior oropharyngeal erythema.   Eyes:      General: Lids are normal. Vision grossly intact. No scleral icterus.        Right eye: No discharge.         Left eye: No discharge.      Extraocular Movements: Extraocular movements intact.      Conjunctiva/sclera: Conjunctivae normal.      Right eye: Right conjunctiva is not injected.      Left eye: Left conjunctiva is not injected.      Pupils: Pupils are equal, round, and reactive to light.   Neck:      Thyroid: No thyroid mass.      Trachea: Trachea normal.   Cardiovascular:      Rate and Rhythm: Normal rate and regular " rhythm.      Heart sounds: Normal heart sounds. No murmur heard.  No gallop.    Pulmonary:      Effort: Pulmonary effort is normal.      Breath sounds: Normal breath sounds and air entry. No wheezing, rhonchi or rales.   Abdominal:      General: There is no distension.      Palpations: Abdomen is soft. There is no mass.      Tenderness: There is no abdominal tenderness. There is no right CVA tenderness, left CVA tenderness, guarding or rebound.   Musculoskeletal:         General: No tenderness or deformity. Normal range of motion.      Cervical back: Full passive range of motion without pain, normal range of motion and neck supple.      Thoracic back: Normal.      Right lower leg: No edema.      Left lower leg: No edema.   Skin:     General: Skin is warm and dry.      Coloration: Skin is not jaundiced.      Findings: No rash.   Neurological:      Mental Status: She is alert and oriented to person, place, and time.      Cranial Nerves: Cranial nerves are intact.      Sensory: Sensation is intact.      Motor: Motor function is intact.      Coordination: Coordination is intact.      Gait: Gait is intact.      Deep Tendon Reflexes: Reflexes are normal and symmetric.   Psychiatric:         Mood and Affect: Mood and affect normal.         Judgment: Judgment normal.      Comments: anxious          Result Review  Data Reviewed:                   Assessment and Plan      Problem List Items Addressed This Visit        Mental Health    Mood disorder (HCC) - Primary    Relevant Medications    Lurasidone HCl (Latuda) 20 MG tablet tablet    busPIRone (BUSPAR) 5 MG tablet    Mixed anxiety and depressive disorder    Relevant Medications    Lurasidone HCl (Latuda) 20 MG tablet tablet    busPIRone (BUSPAR) 5 MG tablet      Other Visit Diagnoses     30 weeks gestation of pregnancy          Patient is here today for anxiety, depression, and mood disorder.  She is also 30 weeks pregnant at this time.  Patient states that she has been  following a therapist in which she was trying to stay off her previous mood and anxiety medications  While being pregnant.  Patient states that she has increased anger, highs and lows of depression, and is anxious. She states that she has children at home where she takes her anger out on and feels that she has gone on long enough trying to treat herself without medications but cannot do it any longer.  She denies any thoughts of homicide or suicide.  I have discussed with Dr. Darby his thoughts on a course of action for treatment for this patient due to her being pregnant.  She has tried and failed other safe medications in pregnancy in the past and does not want to try these again.  Dr. Darby would like to start her on Latuda 20 mg at this visit.  I will also give her BuSpar 3 times daily for anxiety.  Patient states she did talk to her OB about her mood and anxiety and her OB wanted her to follow her PCP for this issue.  I discussed with patient the risk of Latuda 20 mg in pregnancy along with the need to follow with maternal-fetal medicine.  Patient already follows with them due to her previous pregnancy and complications.  Nurses also contacted maternal-fetal medicine to make them aware of starting Latuda at this time so that the patient can be followed appropriately.        Follow Up   No follow-ups on file.  Patient was given instructions and counseling regarding her condition or for health maintenance advice. Please see specific information pulled into the AVS if appropriate.

## 2022-01-18 NOTE — TELEPHONE ENCOUNTER
Per dev [address] Dr office and make them aware that patient is being started on Latuda and Buspar and make sure she has follow up appt with them.  Contacted their office and spoke with Yue, she will make a note in her chart that we started her on these medications, patient has f/u visit in Feb.

## 2022-02-05 ENCOUNTER — HOSPITAL ENCOUNTER (OUTPATIENT)
Age: 32
Setting detail: OBSERVATION
Discharge: HOME | End: 2022-02-06
Attending: OBSTETRICS & GYNECOLOGY | Admitting: OBSTETRICS & GYNECOLOGY
Payer: COMMERCIAL

## 2022-02-05 PROBLEM — Z3A.31 31 WEEKS GESTATION OF PREGNANCY: Status: ACTIVE | Noted: 2022-02-05

## 2022-02-05 LAB
AMNISURE, POC: NEGATIVE
AMNISURE, POC: NEGATIVE
BACTERIA: ABNORMAL /HPF
BILIRUBIN URINE: NEGATIVE
BLOOD, URINE: NEGATIVE
CLARITY: ABNORMAL
COLOR: YELLOW
CRYSTALS, UA: ABNORMAL /HPF
EPITHELIAL CELLS, UA: 12 /HPF (ref 0–5)
GLUCOSE URINE: NEGATIVE MG/DL
HYALINE CASTS: 5 /HPF (ref 0–8)
KETONES, URINE: NEGATIVE MG/DL
LEUKOCYTE ESTERASE, URINE: ABNORMAL
Lab: NORMAL
Lab: NORMAL
NEGATIVE QC PASS/FAIL: NORMAL
NEGATIVE QC PASS/FAIL: NORMAL
NITRITE, URINE: NEGATIVE
PH UA: 7 (ref 5–8)
POSITIVE QC PASS/FAIL: NORMAL
POSITIVE QC PASS/FAIL: NORMAL
PROTEIN UA: ABNORMAL MG/DL
RBC UA: 3 /HPF (ref 0–4)
SARS-COV-2, NAAT: NOT DETECTED
SPECIFIC GRAVITY UA: 1.02 (ref 1–1.03)
UROBILINOGEN, URINE: 1 E.U./DL
WBC UA: 10 /HPF (ref 0–5)

## 2022-02-05 PROCEDURE — 2580000003 HC RX 258: Performed by: OBSTETRICS & GYNECOLOGY

## 2022-02-05 PROCEDURE — 96360 HYDRATION IV INFUSION INIT: CPT

## 2022-02-05 PROCEDURE — 4500000002 HC ER NO CHARGE

## 2022-02-05 PROCEDURE — 84112 EVAL AMNIOTIC FLUID PROTEIN: CPT

## 2022-02-05 PROCEDURE — 81001 URINALYSIS AUTO W/SCOPE: CPT

## 2022-02-05 PROCEDURE — 96361 HYDRATE IV INFUSION ADD-ON: CPT

## 2022-02-05 PROCEDURE — 87635 SARS-COV-2 COVID-19 AMP PRB: CPT

## 2022-02-05 PROCEDURE — 99211 OFF/OP EST MAY X REQ PHY/QHP: CPT

## 2022-02-05 PROCEDURE — G0378 HOSPITAL OBSERVATION PER HR: HCPCS

## 2022-02-05 PROCEDURE — 6370000000 HC RX 637 (ALT 250 FOR IP): Performed by: OBSTETRICS & GYNECOLOGY

## 2022-02-05 RX ORDER — ONDANSETRON 4 MG/1
4 TABLET, ORALLY DISINTEGRATING ORAL EVERY 8 HOURS PRN
Status: DISCONTINUED | OUTPATIENT
Start: 2022-02-05 | End: 2022-02-06 | Stop reason: HOSPADM

## 2022-02-05 RX ORDER — DEXTROSE, SODIUM CHLORIDE, SODIUM LACTATE, POTASSIUM CHLORIDE, AND CALCIUM CHLORIDE 5; .6; .31; .03; .02 G/100ML; G/100ML; G/100ML; G/100ML; G/100ML
INJECTION, SOLUTION INTRAVENOUS CONTINUOUS
Status: DISCONTINUED | OUTPATIENT
Start: 2022-02-05 | End: 2022-02-06 | Stop reason: HOSPADM

## 2022-02-05 RX ORDER — NIFEDIPINE 10 MG/1
10 CAPSULE ORAL ONCE
Status: COMPLETED | OUTPATIENT
Start: 2022-02-05 | End: 2022-02-05

## 2022-02-05 RX ORDER — ONDANSETRON 2 MG/ML
4 INJECTION INTRAMUSCULAR; INTRAVENOUS EVERY 6 HOURS PRN
Status: DISCONTINUED | OUTPATIENT
Start: 2022-02-05 | End: 2022-02-06 | Stop reason: HOSPADM

## 2022-02-05 RX ORDER — ACETAMINOPHEN 325 MG/1
650 TABLET ORAL EVERY 4 HOURS PRN
Status: DISCONTINUED | OUTPATIENT
Start: 2022-02-05 | End: 2022-02-06 | Stop reason: HOSPADM

## 2022-02-05 RX ORDER — 0.9 % SODIUM CHLORIDE 0.9 %
2000 INTRAVENOUS SOLUTION INTRAVENOUS ONCE
Status: COMPLETED | OUTPATIENT
Start: 2022-02-05 | End: 2022-02-05

## 2022-02-05 RX ADMIN — SODIUM CHLORIDE, SODIUM LACTATE, POTASSIUM CHLORIDE, CALCIUM CHLORIDE AND DEXTROSE MONOHYDRATE: 5; 600; 310; 30; 20 INJECTION, SOLUTION INTRAVENOUS at 22:23

## 2022-02-05 RX ADMIN — NIFEDIPINE 10 MG: 10 CAPSULE ORAL at 22:52

## 2022-02-05 RX ADMIN — ACETAMINOPHEN 650 MG: 325 TABLET ORAL at 23:47

## 2022-02-05 RX ADMIN — SODIUM CHLORIDE 1000 ML: 9 INJECTION, SOLUTION INTRAVENOUS at 20:19

## 2022-02-05 ASSESSMENT — PAIN SCALES - GENERAL: PAINLEVEL_OUTOF10: 5

## 2022-02-06 VITALS
BODY MASS INDEX: 26.98 KG/M2 | DIASTOLIC BLOOD PRESSURE: 59 MMHG | SYSTOLIC BLOOD PRESSURE: 103 MMHG | TEMPERATURE: 98.4 F | RESPIRATION RATE: 16 BRPM | HEIGHT: 64 IN | HEART RATE: 97 BPM | WEIGHT: 158 LBS

## 2022-02-06 PROCEDURE — G0378 HOSPITAL OBSERVATION PER HR: HCPCS

## 2022-02-06 PROCEDURE — 96361 HYDRATE IV INFUSION ADD-ON: CPT

## 2022-02-06 NOTE — PROGRESS NOTES
Resting quietly in bed. Denies having any contractions. Remains on EFM. Denies any needs at this time.

## 2022-02-06 NOTE — FLOWSHEET NOTE
Dr. Kathleen Salon called to check on pt. Telephone orders to admit for overnight observation, switch IV fluids to D5LR, and give 10mg procardia PO once for contractions.

## 2022-02-06 NOTE — PROGRESS NOTES
Contractions resolved during the night, pt has been able to rest all night  Plan to discharge pt home, instructions given

## 2022-02-06 NOTE — DISCHARGE SUMMARY
Physician Discharge Summary     Patient ID:  Andreas Lines  [id number]  88 y.o.  1990    Admit date: 2/5/2022    Discharge date:  2/6/2022    Admitting Physician: Nelly Byrd MD    Discharge Diagnoses: 31 weeks gestation of pregnancy [Z3A.31]    Discharged Condition: good    Procedures Performed: iv  hydration    Hospital Course: Patient was admitted, given iv fluids and the contractions resolved    Discharge Exam:  Appears well, AVSS, abdomen soft, appropriately tender, nondistended, BS present,     Disposition:good    Patient Instructions:    Activity: ambulate in house, no lifting or strenuous exercise for 6 weeks, no sex for 6-8 weeks and no driving while on analgesics    Diet: regular    Wound Care: keep wound clean and dry    Discharge Medication:      Medication List      CONTINUE taking these medications    albuterol sulfate  (90 Base) MCG/ACT inhaler  Commonly known as: Ventolin HFA  Inhale 2 puffs into the lungs every 4 hours as needed for Wheezing     fluticasone 50 MCG/ACT nasal spray  Commonly known as: FLONASE  1 spray by Each Nostril route daily     lurasidone 40 MG Tabs tablet  Commonly known as: LATUDA     venlafaxine 75 MG tablet  Commonly known as: EFFEXOR        STOP taking these medications    amphetamine-dextroamphetamine 20 MG tablet  Commonly known as: ADDERALL             Follow-up with Viji NIEVES 2 weeks     Signed:  Nelly Byrd MD  2/6/2022  11:20 AM

## 2022-02-06 NOTE — PROGRESS NOTES
Discharge instructions given. Patient demonstrated good understanding of all. Discharged home in good condition.

## 2022-02-06 NOTE — FLOWSHEET NOTE
Dr. Jimenez Munoz notified via phone of pt arrival and complaints. Telephone order for 2L normal saline IV, PO hydration, and UA.

## 2022-02-06 NOTE — PROGRESS NOTES
Pt presents to L&D unit complaining of pain in back and right leg. Pain is contstant but intensifies with contractions. Pt reports having a loss of fluid yesterday 02/04/22 around 1200. Pt called office and was told to come in but did not want to come until today. Contractions have been going on for 2 days off and on but grew worse today so she decided to come in. Pt reports stomach cramps and diarrhea that started yesterday and migraine headaches on and off for the past several days. Pt has chronic back pain from a slipped disc from childhood. EFM and toco applied to soft nontender abdomen. Contractions palpated, mild. VS obtained WNL. Amnisure negative. Will monitor.

## 2022-02-06 NOTE — H&P
History and Physical    Patient's Name/Date of Birth: Jessie Santos / 1990, (28 y.o.), female    Date: 2022     Chief Complaint:  contractions    HPI:   29 yo  at 32 weeks gestation came complaining of  Contractions and pelvic pressure. On arrival it was noted monitor showing contractions, regular every 3-4 min but the cervix was long closed. Admitted for rehydration. Pt had 2 term deliveries, today she denies uti uri cns or covid symptoms, denies bleeding or leaking fluid.  Reports good fetal mov, no fever or chills    Past Medical History:   Diagnosis Date    ADD (attention deficit disorder)     ADHD (attention deficit hyperactivity disorder)     Chronic back pain     Factor 5 Leiden mutation, heterozygous ([facility] [address]     GERD (gastroesophageal reflux disease)     Hypertension        Past Surgical History:   Procedure Laterality Date    CHOLECYSTECTOMY      DILATION AND CURETTAGE OF UTERUS      TONSILLECTOMY AND ADENOIDECTOMY      WISDOM TOOTH EXTRACTION         Current Facility-Administered Medications   Medication Dose Route Frequency Provider Last Rate Last Admin    ondansetron (ZOFRAN-ODT) disintegrating tablet 4 mg  4 mg Oral Q8H PRN Kenney Ortega MD        Or    ondansetron (ZOFRAN) injection 4 mg  4 mg IntraVENous Q6H PRN Kenney Ortega MD        acetaminophen (TYLENOL) tablet 650 mg  650 mg Oral Q4H PRN Kenney Ortega MD   650 mg at 22 2347    dextrose 5 % in lactated ringers infusion   IntraVENous Continuous Kenney Ortega  mL/hr at 22 0732 Rate Verify at 22 0732       Allergies   Allergen Reactions    Demerol Hcl [Meperidine]      Pt states she becomes violent      Norco [Hydrocodone-Acetaminophen]     Codeine Nausea And Vomiting    Morphine Nausea And Vomiting       Family History   Problem Relation Age of Onset    Colon Cancer Neg Hx     Colon Polyps Neg Hx     Liver Cancer Neg Hx     Liver Disease Neg Hx     Esophageal Cancer Neg Hx     Stomach Cancer Neg Hx     Rectal Cancer Neg Hx        Social History     Socioeconomic History    Marital status: Legally      Spouse name: Not on file    Number of children: Not on file    Years of education: Not on file    Highest education level: Not on file   Occupational History    Not on file   Tobacco Use    Smoking status: Current Some Day Smoker     Packs/day: 0.25     Types: Cigarettes    Smokeless tobacco: Never Used   Vaping Use    Vaping Use: Never used   Substance and Sexual Activity    Alcohol use: No    Drug use: No     Comment: adderall, takes half (30mg) seldomly    Sexual activity: Not Currently   Other Topics Concern    Not on file   Social History Narrative    Not on file     Social Determinants of Health     Financial Resource Strain:     Difficulty of Paying Living Expenses: Not on file   Food Insecurity:     Worried About Running Out of Food in the Last Year: Not on file    Floyd of Food in the Last Year: Not on file   Transportation Needs:     Lack of Transportation (Medical): Not on file    Lack of Transportation (Non-Medical):  Not on file   Physical Activity:     Days of Exercise per Week: Not on file    Minutes of Exercise per Session: Not on file   Stress:     Feeling of Stress : Not on file   Social Connections:     Frequency of Communication with Friends and Family: Not on file    Frequency of Social Gatherings with Friends and Family: Not on file    Attends Taoist Services: Not on file    Active Member of Clubs or Organizations: Not on file    Attends Club or Organization Meetings: Not on file    Marital Status: Not on file   Intimate Partner Violence:     Fear of Current or Ex-Partner: Not on file    Emotionally Abused: Not on file    Physically Abused: Not on file    Sexually Abused: Not on file   Housing Stability:     Unable to Pay for Housing in the Last Year: Not on file    Number of [place] in the Last Year: Not on file    Unstable Housing in the Last Year: Not on file       ROS: Non-contributory    Physical Exam:  Vitals:    22 2325 22 0001 22 0422 22 0705   BP: 121/61 (!) 111/56 116/66 (!) 103/59   Pulse: 127 111 109 97   Resp:   16    Temp:  97.8 °F (36.6 °C) 97.8 °F (36.6 °C) 98.4 °F (36.9 °C)   TempSrc:  Temporal Temporal    Weight:       Height:         Body mass index is 27.12 kg/m². General: no acute distress, breathing without effort    HEENT: PEARLA, hearing normal, no abnormalities in the mouth    Chest: Breath sounds were clear and equal with no rales, wheezes, or rhonchi. Respiratory effort was normal with no retractions or use of accessory muscles. Cardiovascular: Heart sounds were normal with a regular rate and rhythm. There were no murmurs or gallops. Abdomen: Bowel sounds were normal.  The abdomen was soft and non distended. There was no tenderness, guarding, rebound, or rigidity. There was no masses, hepatosplenomegaly, or hernias. Genitourinary: No inguinal hernias were noted on coughing and straining. Pelvic: External genitalia normal, vaginal canal normal, no masses     Assessment/Plan:  1. iup 32 week gestaion,   2.  contraction  3.  Plan to admit for iv hydrationa nd further observation     Electronically signed by Rivas Johns MD on 22 at 11:14 AM CST

## 2022-03-08 LAB — GBS, EXTERNAL RESULT: NEGATIVE

## 2022-03-18 ENCOUNTER — HOSPITAL ENCOUNTER (INPATIENT)
Age: 32
LOS: 2 days | Discharge: HOME | End: 2022-03-20
Attending: OBSTETRICS & GYNECOLOGY | Admitting: OBSTETRICS & GYNECOLOGY
Payer: COMMERCIAL

## 2022-03-18 PROBLEM — O47.9 UTERINE CONTRACTIONS DURING PREGNANCY: Status: ACTIVE | Noted: 2022-03-18

## 2022-03-18 PROBLEM — Z3A.37 37 WEEKS GESTATION OF PREGNANCY: Status: ACTIVE | Noted: 2022-03-18

## 2022-03-18 LAB
ABO/RH: NORMAL
ABO/RH: NORMAL
AMPHETAMINE SCREEN, URINE: NEGATIVE
ANTIBODY SCREEN: NORMAL
ANTIBODY SCREEN: NORMAL
BARBITURATE SCREEN URINE: NEGATIVE
BASOPHILS ABSOLUTE: 0 K/UL (ref 0–0.2)
BASOPHILS RELATIVE PERCENT: 0.4 % (ref 0–1)
BENZODIAZEPINE SCREEN, URINE: NEGATIVE
CANNABINOID SCREEN URINE: NEGATIVE
COCAINE METABOLITE SCREEN URINE: NEGATIVE
EOSINOPHILS ABSOLUTE: 0 K/UL (ref 0–0.6)
EOSINOPHILS RELATIVE PERCENT: 0.1 % (ref 0–5)
HCT VFR BLD CALC: 35.1 % (ref 37–47)
HEMOGLOBIN: 11.3 G/DL (ref 12–16)
IMMATURE GRANULOCYTES #: 0.2 K/UL
LYMPHOCYTES ABSOLUTE: 2.5 K/UL (ref 1.1–4.5)
LYMPHOCYTES RELATIVE PERCENT: 23.4 % (ref 20–40)
Lab: NORMAL
MCH RBC QN AUTO: 29.7 PG (ref 27–31)
MCHC RBC AUTO-ENTMCNC: 32.2 G/DL (ref 33–37)
MCV RBC AUTO: 92.1 FL (ref 81–99)
MONOCYTES ABSOLUTE: 1 K/UL (ref 0–0.9)
MONOCYTES RELATIVE PERCENT: 9.7 % (ref 0–10)
NEUTROPHILS ABSOLUTE: 6.9 K/UL (ref 1.5–7.5)
NEUTROPHILS RELATIVE PERCENT: 64.5 % (ref 50–65)
OPIATE SCREEN URINE: NEGATIVE
PDW BLD-RTO: 15.9 % (ref 11.5–14.5)
PLATELET # BLD: 183 K/UL (ref 130–400)
PMV BLD AUTO: 11.1 FL (ref 9.4–12.3)
RBC # BLD: 3.81 M/UL (ref 4.2–5.4)
SARS-COV-2, NAAT: NOT DETECTED
WBC # BLD: 10.7 K/UL (ref 4.8–10.8)

## 2022-03-18 PROCEDURE — 86901 BLOOD TYPING SEROLOGIC RH(D): CPT

## 2022-03-18 PROCEDURE — 36415 COLL VENOUS BLD VENIPUNCTURE: CPT

## 2022-03-18 PROCEDURE — 1220000000 HC SEMI PRIVATE OB R&B

## 2022-03-18 PROCEDURE — 86592 SYPHILIS TEST NON-TREP QUAL: CPT

## 2022-03-18 PROCEDURE — 80307 DRUG TEST PRSMV CHEM ANLYZR: CPT

## 2022-03-18 PROCEDURE — 99211 OFF/OP EST MAY X REQ PHY/QHP: CPT

## 2022-03-18 PROCEDURE — 87635 SARS-COV-2 COVID-19 AMP PRB: CPT

## 2022-03-18 PROCEDURE — 86850 RBC ANTIBODY SCREEN: CPT

## 2022-03-18 PROCEDURE — 86900 BLOOD TYPING SEROLOGIC ABO: CPT

## 2022-03-18 PROCEDURE — 85025 COMPLETE CBC W/AUTO DIFF WBC: CPT

## 2022-03-18 RX ORDER — ONDANSETRON 4 MG/1
4 TABLET, ORALLY DISINTEGRATING ORAL EVERY 8 HOURS PRN
Status: DISCONTINUED | OUTPATIENT
Start: 2022-03-18 | End: 2022-03-20 | Stop reason: HOSPADM

## 2022-03-18 RX ORDER — SODIUM CHLORIDE, SODIUM LACTATE, POTASSIUM CHLORIDE, CALCIUM CHLORIDE 600; 310; 30; 20 MG/100ML; MG/100ML; MG/100ML; MG/100ML
INJECTION, SOLUTION INTRAVENOUS CONTINUOUS
Status: DISCONTINUED | OUTPATIENT
Start: 2022-03-18 | End: 2022-03-19

## 2022-03-18 RX ORDER — MISOPROSTOL 200 UG/1
800 TABLET ORAL PRN
Status: DISCONTINUED | OUTPATIENT
Start: 2022-03-18 | End: 2022-03-19

## 2022-03-18 RX ORDER — BISACODYL 10 MG
10 SUPPOSITORY, RECTAL RECTAL DAILY PRN
Status: DISCONTINUED | OUTPATIENT
Start: 2022-03-18 | End: 2022-03-20 | Stop reason: HOSPADM

## 2022-03-18 RX ORDER — ACETAMINOPHEN 325 MG/1
650 TABLET ORAL EVERY 4 HOURS PRN
Status: DISCONTINUED | OUTPATIENT
Start: 2022-03-18 | End: 2022-03-20 | Stop reason: HOSPADM

## 2022-03-18 RX ORDER — IBUPROFEN 400 MG/1
800 TABLET ORAL EVERY 8 HOURS PRN
Status: DISCONTINUED | OUTPATIENT
Start: 2022-03-18 | End: 2022-03-20 | Stop reason: HOSPADM

## 2022-03-18 RX ORDER — SODIUM CHLORIDE, SODIUM LACTATE, POTASSIUM CHLORIDE, AND CALCIUM CHLORIDE .6; .31; .03; .02 G/100ML; G/100ML; G/100ML; G/100ML
500 INJECTION, SOLUTION INTRAVENOUS PRN
Status: DISCONTINUED | OUTPATIENT
Start: 2022-03-18 | End: 2022-03-19

## 2022-03-18 RX ORDER — SODIUM CHLORIDE, SODIUM LACTATE, POTASSIUM CHLORIDE, AND CALCIUM CHLORIDE .6; .31; .03; .02 G/100ML; G/100ML; G/100ML; G/100ML
1000 INJECTION, SOLUTION INTRAVENOUS PRN
Status: DISCONTINUED | OUTPATIENT
Start: 2022-03-18 | End: 2022-03-19

## 2022-03-18 RX ORDER — ONDANSETRON 2 MG/ML
8 INJECTION INTRAMUSCULAR; INTRAVENOUS EVERY 8 HOURS PRN
Status: DISCONTINUED | OUTPATIENT
Start: 2022-03-18 | End: 2022-03-19

## 2022-03-18 RX ORDER — ONDANSETRON 2 MG/ML
4 INJECTION INTRAMUSCULAR; INTRAVENOUS EVERY 6 HOURS PRN
Status: DISCONTINUED | OUTPATIENT
Start: 2022-03-18 | End: 2022-03-20 | Stop reason: HOSPADM

## 2022-03-18 RX ORDER — DOCUSATE SODIUM 100 MG/1
100 CAPSULE, LIQUID FILLED ORAL 2 TIMES DAILY
Status: DISCONTINUED | OUTPATIENT
Start: 2022-03-18 | End: 2022-03-19 | Stop reason: SDUPTHER

## 2022-03-18 RX ORDER — LIDOCAINE HYDROCHLORIDE 10 MG/ML
30 INJECTION, SOLUTION EPIDURAL; INFILTRATION; INTRACAUDAL; PERINEURAL PRN
Status: DISCONTINUED | OUTPATIENT
Start: 2022-03-18 | End: 2022-03-19

## 2022-03-18 RX ORDER — SODIUM CHLORIDE 0.9 % (FLUSH) 0.9 %
5-40 SYRINGE (ML) INJECTION EVERY 12 HOURS SCHEDULED
Status: DISCONTINUED | OUTPATIENT
Start: 2022-03-18 | End: 2022-03-19

## 2022-03-18 RX ORDER — METHYLERGONOVINE MALEATE 0.2 MG/ML
200 INJECTION INTRAVENOUS PRN
Status: DISCONTINUED | OUTPATIENT
Start: 2022-03-18 | End: 2022-03-20 | Stop reason: HOSPADM

## 2022-03-18 RX ORDER — CARBOPROST TROMETHAMINE 250 UG/ML
250 INJECTION, SOLUTION INTRAMUSCULAR PRN
Status: DISCONTINUED | OUTPATIENT
Start: 2022-03-18 | End: 2022-03-19

## 2022-03-18 RX ORDER — SODIUM CHLORIDE 9 MG/ML
25 INJECTION, SOLUTION INTRAVENOUS PRN
Status: DISCONTINUED | OUTPATIENT
Start: 2022-03-18 | End: 2022-03-19

## 2022-03-18 RX ORDER — SODIUM CHLORIDE 0.9 % (FLUSH) 0.9 %
5-40 SYRINGE (ML) INJECTION PRN
Status: DISCONTINUED | OUTPATIENT
Start: 2022-03-18 | End: 2022-03-19

## 2022-03-19 PROCEDURE — 1220000000 HC SEMI PRIVATE OB R&B

## 2022-03-19 PROCEDURE — 6360000002 HC RX W HCPCS: Performed by: OBSTETRICS & GYNECOLOGY

## 2022-03-19 PROCEDURE — 7200000001 HC VAGINAL DELIVERY

## 2022-03-19 PROCEDURE — 2580000003 HC RX 258: Performed by: OBSTETRICS & GYNECOLOGY

## 2022-03-19 PROCEDURE — 6370000000 HC RX 637 (ALT 250 FOR IP): Performed by: OBSTETRICS & GYNECOLOGY

## 2022-03-19 PROCEDURE — 59409 OBSTETRICAL CARE: CPT | Performed by: OBSTETRICS & GYNECOLOGY

## 2022-03-19 PROCEDURE — 2500000003 HC RX 250 WO HCPCS: Performed by: OBSTETRICS & GYNECOLOGY

## 2022-03-19 RX ORDER — TRANEXAMIC ACID 100 MG/ML
1000 INJECTION, SOLUTION INTRAVENOUS ONCE
Status: COMPLETED | OUTPATIENT
Start: 2022-03-19 | End: 2022-03-19

## 2022-03-19 RX ORDER — MISOPROSTOL 200 UG/1
200 TABLET ORAL PRN
Status: DISCONTINUED | OUTPATIENT
Start: 2022-03-19 | End: 2022-03-20 | Stop reason: HOSPADM

## 2022-03-19 RX ORDER — IBUPROFEN 400 MG/1
800 TABLET ORAL EVERY 8 HOURS
Status: DISCONTINUED | OUTPATIENT
Start: 2022-03-19 | End: 2022-03-20 | Stop reason: HOSPADM

## 2022-03-19 RX ORDER — CARBOPROST TROMETHAMINE 250 UG/ML
250 INJECTION, SOLUTION INTRAMUSCULAR PRN
Status: DISCONTINUED | OUTPATIENT
Start: 2022-03-19 | End: 2022-03-20 | Stop reason: HOSPADM

## 2022-03-19 RX ORDER — DOCUSATE SODIUM 100 MG/1
100 CAPSULE, LIQUID FILLED ORAL 2 TIMES DAILY
Status: DISCONTINUED | OUTPATIENT
Start: 2022-03-19 | End: 2022-03-20 | Stop reason: HOSPADM

## 2022-03-19 RX ORDER — MISOPROSTOL 200 UG/1
800 TABLET ORAL PRN
Status: DISCONTINUED | OUTPATIENT
Start: 2022-03-19 | End: 2022-03-20 | Stop reason: HOSPADM

## 2022-03-19 RX ORDER — SERTRALINE HYDROCHLORIDE 25 MG/1
50 TABLET, FILM COATED ORAL DAILY
Status: DISCONTINUED | OUTPATIENT
Start: 2022-03-19 | End: 2022-03-20 | Stop reason: HOSPADM

## 2022-03-19 RX ADMIN — Medication 87.3 MILLI-UNITS/MIN: at 16:50

## 2022-03-19 RX ADMIN — TRANEXAMIC ACID 1000 MG: 1 INJECTION, SOLUTION INTRAVENOUS at 15:37

## 2022-03-19 RX ADMIN — Medication 1 MILLI-UNITS/MIN: at 09:07

## 2022-03-19 RX ADMIN — SERTRALINE HYDROCHLORIDE 50 MG: 25 TABLET ORAL at 21:09

## 2022-03-19 RX ADMIN — Medication 87.3 MILLI-UNITS/MIN: at 14:23

## 2022-03-19 RX ADMIN — SODIUM CHLORIDE, POTASSIUM CHLORIDE, SODIUM LACTATE AND CALCIUM CHLORIDE: 600; 310; 30; 20 INJECTION, SOLUTION INTRAVENOUS at 07:32

## 2022-03-19 RX ADMIN — ACETAMINOPHEN 650 MG: 325 TABLET ORAL at 00:10

## 2022-03-19 RX ADMIN — BUTORPHANOL TARTRATE 1 MG: 2 INJECTION, SOLUTION INTRAMUSCULAR; INTRAVENOUS at 12:25

## 2022-03-19 RX ADMIN — IBUPROFEN 800 MG: 400 TABLET, FILM COATED ORAL at 14:40

## 2022-03-19 ASSESSMENT — PAIN SCALES - GENERAL
PAINLEVEL_OUTOF10: 6
PAINLEVEL_OUTOF10: 10
PAINLEVEL_OUTOF10: 5

## 2022-03-19 NOTE — H&P
Maria Elena De Anda is a 28 y.o. female patient. No diagnosis found. Past Medical History:   Diagnosis Date    ADD (attention deficit disorder)     ADHD (attention deficit hyperactivity disorder)     Chronic back pain     Factor 5 Leiden mutation, heterozygous ([address]     GERD (gastroesophageal reflux disease)     Hypertension     MTHFR (methylene THF reductase) deficiency and homocystinuria (HCC)      OB History        7    Para   3    Term   3       0    AB   3    Living   3       SAB   3    IAB        Ectopic        Molar        Multiple        Live Births                  37w5d  Estimated Date of Delivery: 22  Allergies   Allergen Reactions    Demerol Hcl [Meperidine]      Pt states she becomes violent      Norco [Hydrocodone-Acetaminophen]     Codeine Nausea And Vomiting    Morphine Nausea And Vomiting     Principal Problem:    37 weeks gestation of pregnancy  Active Problems:    Uterine contractions during pregnancy  Resolved Problems:    * No resolved hospital problems. *    Blood pressure 107/63, pulse 88, temperature 98.3 °F (36.8 °C), temperature source Temporal, resp. rate 19, SpO2 100 %. Maternal Medical History:   Reason for admission: Contractions. Contractions: Onset was 6-12 hours ago. Perceived severity is strong. Fetal activity: Perceived fetal activity is normal.      Prenatal complications: no prenatal complications  Prenatal Complications - Diabetes: none. Maternal Exam:   Uterine Assessment: Contraction strength is firm. Contraction frequency is irregular. Abdomen: Patient reports no abdominal tenderness. Fetal presentation: vertex    Introitus: Normal vulva. Normal vagina. Pelvis: adequate for delivery. Cervix: 4/60/-2    Fetal Exam  Fetal Monitor Review: Mode: ultrasound. Variability: moderate (6-25 bpm). Pattern: accelerations present and no decelerations.       Fetal State Assessment: Category I - tracings are normal.          Assessment:  Active phase labor. Membrane status: intact. Fetal well-being: normal.   Bleeding source: bloody show.    GBS negative    Plan:  Admit for labor management  Routine admission labs and intrapartum care  Analgesia per patient request  Nemo Armas MD  3/19/2022

## 2022-03-19 NOTE — PROGRESS NOTES
Pt arrived at L&D from the ER stating she was having a lot of pelvic pressure and burning due to the pressure. EFM and toco applied to soft, non-tender abdomen. Pt denies any vaginal bleeding, but stated she had some discharge and has been losing more of her mucus plug. Pt states +FM. Will continue to monitor.

## 2022-03-20 VITALS
HEART RATE: 79 BPM | DIASTOLIC BLOOD PRESSURE: 69 MMHG | RESPIRATION RATE: 16 BRPM | SYSTOLIC BLOOD PRESSURE: 106 MMHG | TEMPERATURE: 98.7 F | OXYGEN SATURATION: 97 %

## 2022-03-20 LAB
HCT VFR BLD CALC: 31.8 % (ref 37–47)
HEMOGLOBIN: 10.2 G/DL (ref 12–16)
MCH RBC QN AUTO: 29.7 PG (ref 27–31)
MCHC RBC AUTO-ENTMCNC: 32.1 G/DL (ref 33–37)
MCV RBC AUTO: 92.7 FL (ref 81–99)
PDW BLD-RTO: 15.9 % (ref 11.5–14.5)
PLATELET # BLD: 174 K/UL (ref 130–400)
PMV BLD AUTO: 11.1 FL (ref 9.4–12.3)
RBC # BLD: 3.43 M/UL (ref 4.2–5.4)
WBC # BLD: 13.3 K/UL (ref 4.8–10.8)

## 2022-03-20 PROCEDURE — 6370000000 HC RX 637 (ALT 250 FOR IP): Performed by: OBSTETRICS & GYNECOLOGY

## 2022-03-20 PROCEDURE — 36415 COLL VENOUS BLD VENIPUNCTURE: CPT

## 2022-03-20 PROCEDURE — 85027 COMPLETE CBC AUTOMATED: CPT

## 2022-03-20 PROCEDURE — 99252 IP/OBS CONSLTJ NEW/EST SF 35: CPT | Performed by: PSYCHIATRY & NEUROLOGY

## 2022-03-20 RX ADMIN — IBUPROFEN 800 MG: 400 TABLET, FILM COATED ORAL at 06:39

## 2022-03-20 RX ADMIN — SERTRALINE HYDROCHLORIDE 50 MG: 25 TABLET ORAL at 07:20

## 2022-03-20 RX ADMIN — DOCUSATE SODIUM 100 MG: 100 CAPSULE, LIQUID FILLED ORAL at 07:20

## 2022-03-20 ASSESSMENT — PAIN SCALES - GENERAL
PAINLEVEL_OUTOF10: 0
PAINLEVEL_OUTOF10: 6

## 2022-03-20 NOTE — PROGRESS NOTES
Matt Sesay is doing well this morning. States she is feeling much better. Bleeding is normal.  Pain controlled with Ibuprofen. States mood is improved but wants to get back on her meds as soon as possible. She has plans for seeing Dr. Raegan Bowens tomorrow, who was managing her meds pre-pregnancy. /69   Pulse 79   Temp 98.7 °F (37.1 °C) (Temporal)   Resp 16   SpO2 97%   Breastfeeding Unknown   NAD  Abd soft, fundus firm  Lochia WNL  Ext no edema, NT  Psych  No SI/HI    Recent Labs     22  0419 22   WBC 13.3* 10.7   HGB 10.2* 11.3*   HCT 31.8* 35.1*   MCV 92.7 92.1    183     Assessment  1. PPD#1 s/p   2. H/o depression, anxiety and ADHD    Plan  1. Psych consult ordered  2. Zoloft started last night  3.   Dispo - home pending consult
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
## 2022-03-20 NOTE — PROGRESS NOTES
Notified Dr Mariano Rowley of pt concerns for PPD and need for meds. Zoloft 50mg Daily ordered along with Psych consult.

## 2022-03-20 NOTE — PROGRESS NOTES
Called to pt room to speak with nurse regarding concerns for need to start meds for PPD. Pt was noted to be very tearful and anxious. Pt does not want to wait till the morning to speak with Dr. Redd Álvarez. States she has an extensive depression history which includes prenatal and post renato depression. Pt just says she is \"so sad. I'm just really really sad.' Pt mentioned that she sees counselors and has regularly scheduled therapy sessions that have been organized through the [facility]. Pt states, 'I have worked very hard to get the place where I am at, and I refuse to go back. We (pt and therapists) call it self-sabotage when we know there is help available and we refuse to ask for it.' Pt has not elaborated on reasons why she uses the [facility] as a resource. She did mention that she left her  in 2021 after 'he turned into a different person'. On admission on 3/18/2021, she stated \"I do not want a man at all to be my provider for this delivery. \" Pt has been appropriate with baby. Is attached well, feeding appropriately and holding and consoling infant.

## 2022-03-20 NOTE — FLOWSHEET NOTE
Patient given information on [facility] as advised by Dr. Doreen Fitzpatrick.

## 2022-03-21 ENCOUNTER — OFFICE VISIT (OUTPATIENT)
Dept: FAMILY MEDICINE CLINIC | Facility: CLINIC | Age: 32
End: 2022-03-21

## 2022-03-21 VITALS
HEIGHT: 64 IN | BODY MASS INDEX: 25.78 KG/M2 | DIASTOLIC BLOOD PRESSURE: 70 MMHG | SYSTOLIC BLOOD PRESSURE: 120 MMHG | OXYGEN SATURATION: 98 % | HEART RATE: 94 BPM | TEMPERATURE: 98 F | WEIGHT: 151 LBS

## 2022-03-21 DIAGNOSIS — F41.8 MIXED ANXIETY AND DEPRESSIVE DISORDER: ICD-10-CM

## 2022-03-21 DIAGNOSIS — F39 MOOD DISORDER: Primary | ICD-10-CM

## 2022-03-21 LAB — RPR: NORMAL

## 2022-03-21 PROCEDURE — 99213 OFFICE O/P EST LOW 20 MIN: CPT

## 2022-03-21 NOTE — PROGRESS NOTES
"Chief Complaint  Anxiety    Subjective          Raquel Best presents to [facility]  Medication refills       Objective   Vital Signs:   /70 (BP Location: Left arm, Patient Position: Sitting, Cuff Size: Adult)   Pulse 94   Temp 98 °F (36.7 °C)   Ht 162.6 cm (64\")   Wt 68.5 kg (151 lb)   SpO2 98%   BMI 25.92 kg/m²     Physical Exam  Constitutional:       Appearance: Normal appearance. She is well-developed.   HENT:      Head: Normocephalic and atraumatic.      Right Ear: Tympanic membrane, ear canal and external ear normal.      Left Ear: Tympanic membrane, ear canal and external ear normal.      Nose: Nose normal. No septal deviation, nasal tenderness or congestion.      Mouth/Throat:      Lips: Pink. No lesions.      Mouth: Mucous membranes are moist. No oral lesions.      Dentition: Normal dentition.      Pharynx: Oropharynx is clear. No pharyngeal swelling, oropharyngeal exudate or posterior oropharyngeal erythema.   Eyes:      General: Lids are normal. Vision grossly intact. No scleral icterus.        Right eye: No discharge.         Left eye: No discharge.      Extraocular Movements: Extraocular movements intact.      Conjunctiva/sclera: Conjunctivae normal.      Right eye: Right conjunctiva is not injected.      Left eye: Left conjunctiva is not injected.      Pupils: Pupils are equal, round, and reactive to light.   Neck:      Thyroid: No thyroid mass.      Trachea: Trachea normal.   Cardiovascular:      Rate and Rhythm: Normal rate and regular rhythm.      Heart sounds: Normal heart sounds. No murmur heard.    No gallop.   Pulmonary:      Effort: Pulmonary effort is normal.      Breath sounds: Normal breath sounds and air entry. No wheezing, rhonchi or rales.   Abdominal:      General: There is no distension.      Palpations: Abdomen is soft. There is no mass.      Tenderness: There is no abdominal tenderness. There is no right CVA tenderness, left CVA tenderness, " guarding or rebound.   Musculoskeletal:         General: No tenderness or deformity. Normal range of motion.      Cervical back: Full passive range of motion without pain, normal range of motion and neck supple.      Thoracic back: Normal.      Right lower leg: No edema.      Left lower leg: No edema.   Skin:     General: Skin is warm and dry.      Coloration: Skin is not jaundiced.      Findings: No rash.   Neurological:      Mental Status: She is alert and oriented to person, place, and time.      Cranial Nerves: Cranial nerves are intact.      Sensory: Sensation is intact.      Motor: Motor function is intact.      Coordination: Coordination is intact.      Gait: Gait is intact.      Deep Tendon Reflexes: Reflexes are normal and symmetric.   Psychiatric:         Mood and Affect: Affect normal. Mood is anxious.         Judgment: Judgment normal.        Result Review :                 Assessment and Plan    Diagnoses and all orders for this visit:    1. Mood disorder (HCC) (Primary)    2. Mixed anxiety and depressive disorder    Patient is here today requesting to be restarted on Adderall XR since having her baby. Patient was recently pregnant and started on Latuda. She states that she quit the Latuda and felt as if it was not working for her. Patient was discharged Saturday from having her infant and ws started on Zoloft by Dr. Cuevas. Patient is also breastfeeding at this time. I discussed with patient the importance of getting moods stable and under control before adding back an amphetamine. I also discussed with her the risk in breastfeeding with taking an amphetamine as well. Patient will follow up in 1 month to reevaluate mood.         Follow Up   Return in about 1 month (around 4/21/2022).  Patient was given instructions and counseling regarding her condition or for health maintenance advice. Please see specific information pulled into the AVS if appropriate.

## 2022-04-01 NOTE — L&D DELIVERY NOTE
Mother's Information    Labor Events     Labor?: No  Cervical Ripening:   Now         Elizabeth Chacko [[id number]]    Labor Events     Labor?: No   Steroids?: None  Cervical Ripening Date/Time:     Antibiotics Received during Labor?: No  Rupture Identifier: Sac 1   Rupture Date/Time: 3/19/22 09:23:00   Rupture Type: AROM  Fluid Color: Meconium  Meconium Consistency:  Thin  Fluid Odor: None  Fluid Volume: Scant  Induction: Gel  Augmentation: Oxytocin, AROM  Labor Complications: None     Anesthesia    Method: Other     Start Pushing    Labor onset date/time:   Now   Dilation complete date/time: 3/19/22 13:49:00 CDT Now   Start pushing date/time: 3/19/2022 13:52:00   Decision date/time (emergent ):       Labor Length    2nd stage: 0h 12m  3rd stage: 0h 05m     Delivery ([place])    Delivery Date/Time:  3/19/22 14:01:00   Delivery Type: Vaginal, Spontaneous    Details:         Presentation    Presentation: Vertex  _: Occiput  _: Anterior     Shoulder Dystocia    Shoulder Dystocia Present?: No  Add Second Maneuver  Add Third Maneuver  Add Fourth Maneuver  Add Fifth Maneuver  Add Sixth Maneuver  Add Seventh Maneuver  Add Eighth Maneuver  Add Ninth Maneuver     Assisted Delivery Details    Forceps Attempted?: No  Vacuum Extractor Attempted?: No     Document Additional Attempt       Document Additional Attempt             Cord    Vessels: 3 Vessels  Complications: None  Delayed Cord Clamping?: Yes  Cord Clamped Date/Time: 3/19/2022 14:03:00  Cord Blood Disposition: Discard     Placenta    Date/Time: 3/19/2022 14:06:00  Removal: Spontaneous  Appearance: Intact  Disposition: Discarded     Lacerations    Episiotomy: None  Perineal Lacerations: None  Other Lacerations: no non-perineal laceration     Vaginal Counts    Initial Count Personnel: ELISA ARMENTA  Initial Count Verified By: Rao Miller    Sponges Needles Instruments   Initial Counts Correct Correct    Final Counts Correct Correct    Final Count Personnel: Haider Figueroa  Final Count Verified By: Ella Mccloud  Accurate Final Count?: Yes  If the count is incorrect due to Intentionally Retained Foreign Object (IRFO) add the IRFO LDA in Lines/Drains. Add LDA: Link to [facility]     Blood Loss  Mother: Inge Hannon #[id number]   Start of Mother's Information    Delivery Blood Loss  22 0201 - 22 1401    Blood Hospital Encounter 200 mL    Quantitative Blood Loss (mL) Hospital Encounter 296 grams    Total  496         End of Mother's Information  Mother: Inge Hannon #[id number]          Delivery Providers    Delivering clinician: Page Shaw MD     Provider Role    Page Shaw MD Obstetrician    Tien Cruz, RN Primary Nurse    Manuel Pulido Primary [place] Nurse    Colin Bardales [facility]          [place] Assessment    Living Status: Living  Delivery Location: L&D     Apgar Scoring Key:    0 1 2    Skin Color: Blue or pale Acrocyanotic Completely pink    Heart Rate: Absent <100 bpm >100 bpm    Reflex Irritability: No response Grimace Cry or active withdrawal    Muscle Tone: Limp Some flexion Active motion    Respiratory Effort: Absent Weak cry; hypoventilation Good, crying                  Skin Color:   Heart Rate:   Reflex Irritability:   Muscle Tone:   Respiratory Effort:    Total:            1 Minute:    1    2    2    2    1    8        Apgar 1 total from OB History    5 Minute:    2    2    2    2    2    10        Apgar 5 total from OB History    10 Minute:              15 Minute:              20 Minute:                      Apgars Assigned By: Rafal Bartlett          Resuscitation    Method: Bulb Suction, Stimulation            Measurements    Birth Weight: 3390 g Birth Length: 0.483 m   Head Circumference: 0.34 m           Title    Skin to Skin Initiation Date/Time: 3/19/22 14:01:00 CDT   Skin to Skin With: Mother   Skin to Skin End Date/Time:     Breastfeeding Initiated Date/Time: 3/19/2022 14:49:00

## 2022-04-11 ENCOUNTER — OFFICE VISIT (OUTPATIENT)
Dept: FAMILY MEDICINE CLINIC | Facility: CLINIC | Age: 32
End: 2022-04-11

## 2022-04-11 VITALS
WEIGHT: 139 LBS | HEART RATE: 84 BPM | HEIGHT: 64 IN | TEMPERATURE: 96.7 F | SYSTOLIC BLOOD PRESSURE: 120 MMHG | DIASTOLIC BLOOD PRESSURE: 90 MMHG | BODY MASS INDEX: 23.73 KG/M2 | RESPIRATION RATE: 20 BRPM

## 2022-04-11 DIAGNOSIS — F41.8 MIXED ANXIETY AND DEPRESSIVE DISORDER: ICD-10-CM

## 2022-04-11 DIAGNOSIS — F90.0 ATTENTION DEFICIT HYPERACTIVITY DISORDER (ADHD), PREDOMINANTLY INATTENTIVE TYPE: Primary | ICD-10-CM

## 2022-04-11 PROCEDURE — 99214 OFFICE O/P EST MOD 30 MIN: CPT | Performed by: NURSE PRACTITIONER

## 2022-04-11 NOTE — PROGRESS NOTES
"Chief Complaint  f/u anxiety, f/u depression, and f/u ADHD    Subjective    History of Present Illness      Patient presents to [facility] for   Pt states that she is here for a f/u with anxiety and depression and says her mood is stable. Pt states that she here to get back on ADHD medication.    Anxiety  Presents for follow-up visit.            Review of Systems    I have reviewed and agree with the HPI information as above.  Erik Darby MD     Objective   Vital Signs:   /90   Pulse 84   Temp 96.7 °F (35.9 °C)   Resp 20   Ht 162.6 cm (64\")   Wt 63 kg (139 lb)   BMI 23.86 kg/m²     Patient's Body mass index is 23.86 kg/m². indicating that she is within normal range (BMI 18.5-24.9). No BMI management plan needed..      Physical Exam  Constitutional:       Appearance: Normal appearance. She is normal weight.   Cardiovascular:      Rate and Rhythm: Normal rate and regular rhythm.      Heart sounds: Normal heart sounds.   Pulmonary:      Effort: Pulmonary effort is normal.      Breath sounds: Normal breath sounds.   Neurological:      Mental Status: She is alert.   Psychiatric:         Mood and Affect: Mood normal.         Behavior: Behavior normal.          CLARENCE: Over the last two weeks, how often have you been bothered by the following problems?  Feeling nervous, anxious or on edge: Not at all  Not being able to stop or control worrying: Not at all  Worrying too much about different things: Not at all  Trouble Relaxing: Not at all  Being so restless that it is hard to sit still: Not at all  Becoming easily annoyed or irritable: Not at all  Feeling afraid as if something awful might happen: Not at all  CLARENCE 7 Total Score: 0  If you checked any problems, how difficult have these problems made it for you to do your work, take care of things at home, or get along with other people: Not difficult at all    Result Review  Data Reviewed:                   Assessment and Plan  "     Problem List Items Addressed This Visit        Mental Health    Attention deficit hyperactivity disorder (ADHD) - Primary    Relevant Medications    lisdexamfetamine (Vyvanse) 40 MG capsule    Mixed anxiety and depressive disorder    Current Assessment & Plan     Patient's depression is recurrent and is mild without psychosis. Their depression is currently in full remission and the condition is improving with treatment. This will be reassessed in 3 months. F/U as described:patient will continue current medication therapy.           Relevant Medications    lisdexamfetamine (Vyvanse) 40 MG capsule              Follow Up   Return in about 4 weeks (around 5/9/2022) for Annual physical, Recheck.  Patient was given instructions and counseling regarding her condition or for health maintenance advice. Please see specific information pulled into the AVS if appropriate.

## 2022-04-11 NOTE — ASSESSMENT & PLAN NOTE
Patient's depression is recurrent and is mild without psychosis. Their depression is currently in full remission and the condition is improving with treatment. This will be reassessed in 3 months. F/U as described:patient will continue current medication therapy.

## 2022-04-18 NOTE — DISCHARGE SUMMARY
Obstetric Discharge Summary    Admitting Diagnosis  Active labor  OB History        7    Para   4    Term   4       0    AB   3    Living   4       SAB   3    IAB        Ectopic        Molar        Multiple   0    Live Births   1                Reasons for Admission on 3/18/2022  6:46 PM  37 weeks gestation of pregnancy [Z3A.37]  Uterine contractions during pregnancy [O47.9]  No comment available  Onset of Labor    Prenatal Procedures  None    Intrapartum Procedures        Multiple birth?: No        Spontaneous Vaginal Delivery: See Labor and Delivery Summary       Postpartum Procedures  None    Postpartum/Operative Complications       [place] Data  Information for the patient's :  Haim Pena [[id number]]   female   Birth Weight: 7 lb 7.6 oz (3.39 kg)     Discharge With Mother  Complications: No    Discharge Diagnosis     S/p  in stable condition  Discharge Information  Discharge Medication List as of 3/20/2022  1:23 PM      CONTINUE these medications which have NOT CHANGED    Details   lurasidone (LATUDA) 40 MG TABS tablet Take 20 mg by mouth dailyHistorical Med      fluticasone (FLONASE) 50 MCG/ACT nasal spray 1 spray by Each Nostril route daily, Disp-1 Bottle, R-0Print      albuterol sulfate HFA (VENTOLIN HFA) 108 (90 Base) MCG/ACT inhaler Inhale 2 puffs into the lungs every 4 hours as needed for Wheezing, Disp-1 Inhaler, R-0Print      venlafaxine (EFFEXOR) 75 MG tablet Take 75 mg by mouth 3 times dailyHistorical Med             No discharge procedures on file.     Discharge to: Home  Follow up in 2 weeks   Comments

## 2022-05-09 ENCOUNTER — OFFICE VISIT (OUTPATIENT)
Dept: FAMILY MEDICINE CLINIC | Facility: CLINIC | Age: 32
End: 2022-05-09

## 2022-05-09 ENCOUNTER — LAB (OUTPATIENT)
Dept: LAB | Facility: HOSPITAL | Age: 32
End: 2022-05-09

## 2022-05-09 VITALS
DIASTOLIC BLOOD PRESSURE: 64 MMHG | TEMPERATURE: 97 F | OXYGEN SATURATION: 99 % | RESPIRATION RATE: 16 BRPM | SYSTOLIC BLOOD PRESSURE: 112 MMHG | HEIGHT: 64 IN | BODY MASS INDEX: 22.77 KG/M2 | HEART RATE: 90 BPM | WEIGHT: 133.4 LBS

## 2022-05-09 DIAGNOSIS — F41.8 MIXED ANXIETY AND DEPRESSIVE DISORDER: Primary | ICD-10-CM

## 2022-05-09 DIAGNOSIS — F90.0 ATTENTION DEFICIT HYPERACTIVITY DISORDER (ADHD), PREDOMINANTLY INATTENTIVE TYPE: ICD-10-CM

## 2022-05-09 LAB
AMPHET+METHAMPHET UR QL: POSITIVE
AMPHETAMINES UR QL: NEGATIVE
BARBITURATES UR QL SCN: NEGATIVE
BENZODIAZ UR QL SCN: NEGATIVE
BUPRENORPHINE SERPL-MCNC: NEGATIVE NG/ML
CANNABINOIDS SERPL QL: NEGATIVE
COCAINE UR QL: NEGATIVE
METHADONE UR QL SCN: NEGATIVE
OPIATES UR QL: NEGATIVE
OXYCODONE UR QL SCN: NEGATIVE
PCP UR QL SCN: NEGATIVE
PROPOXYPH UR QL: NEGATIVE
TRICYCLICS UR QL SCN: NEGATIVE

## 2022-05-09 PROCEDURE — 99214 OFFICE O/P EST MOD 30 MIN: CPT | Performed by: PEDIATRICS

## 2022-05-09 PROCEDURE — 80306 DRUG TEST PRSMV INSTRMNT: CPT

## 2022-05-09 NOTE — PROGRESS NOTES
"Chief Complaint  ADHD (3 month med check.  She states medication is not working for her.  She states she is unable to focus.  ), Annual Exam, and mood disorder (Med check no concerns or complaints )    Subjective    History of Present Illness      Patient presents to [facility] for   ADHD 3 month med check.  She states medication is not working for her.  She states she is unable to focus.   HER ZOLOFT IS DOING GREAT.    Annual Exam      mood disorder Med check no concerns or complaints        ADHD/Mood HPI    Visit for:  follow-up. Most recent visit was 1 month ago.  Interim changes to follow up on today: new medication: vyvanse  Work/School Performance:  struggling  Cognitive:  unable to focus    Behavior  Hyperactivity: is not hyperactive  Impulsivity: no impulsivity  Tasking: difficulty initiating tasks    Social  ADHD social/impulsive symptoms:  not impatient    Behavioral health  Behavior: no concerns  Emotional coping: demonstrates feelings of no concerns       Review of Systems    I have reviewed and agree with the HPI information as above.  Erik Darby MD     Objective   Vital Signs:   /64   Pulse 90   Temp 97 °F (36.1 °C)   Resp 16   Ht 162.6 cm (64\")   Wt 60.5 kg (133 lb 6.4 oz)   SpO2 99%   BMI 22.90 kg/m²     BMI is within normal parameters. No follow-up required.      Physical Exam  Constitutional:       Appearance: Normal appearance. She is normal weight.   Cardiovascular:      Rate and Rhythm: Normal rate and regular rhythm.      Heart sounds: Normal heart sounds.   Pulmonary:      Effort: Pulmonary effort is normal.      Breath sounds: Normal breath sounds.   Neurological:      Mental Status: She is alert.   Psychiatric:         Mood and Affect: Mood normal.         Behavior: Behavior normal.          CLARENCE-7:      PHQ-2 Depression Screening  Little interest or pleasure in doing things?     Feeling down, depressed, or hopeless?     PHQ-2 Total Score   "     PHQ-9 Depression Screening  Little interest or pleasure in doing things?     Feeling down, depressed, or hopeless?     Trouble falling or staying asleep, or sleeping too much?     Feeling tired or having little energy?     Poor appetite or overeating?     Feeling bad about yourself - or that you are a failure or have let yourself or your family down?     Trouble concentrating on things, such as reading the newspaper or watching television?     Moving or speaking so slowly that other people could have noticed? Or the opposite - being so fidgety or restless that you have been moving around a lot more than usual?     Thoughts that you would be better off dead, or of hurting yourself in some way?     PHQ-9 Total Score     If you checked off any problems, how difficult have these problems made it for you to do your work, take care of things at home, or get along with other people?        Result Review  Data Reviewed:     Urine Drug Screen - Urine, Clean Catch (03/24/2021 15:46)                Assessment and Plan      Problem List Items Addressed This Visit        Mental Health    Attention deficit hyperactivity disorder (ADHD)    Current Assessment & Plan       INITIALLY DID ok then less effective  Will increase dose to 50mg           Relevant Medications    sertraline (ZOLOFT) 50 MG tablet    lisdexamfetamine (Vyvanse) 50 MG capsule    Other Relevant Orders    Urine Drug Screen - Urine, Clean Catch (Completed)    Mixed anxiety and depressive disorder - Primary    Current Assessment & Plan       She is doing very well with her zoloft and is very pleased with its success   Will refill           Relevant Medications    sertraline (ZOLOFT) 50 MG tablet    lisdexamfetamine (Vyvanse) 50 MG capsule              Follow Up   Return in about 4 weeks (around 6/6/2022) for Recheck.  Patient was given instructions and counseling regarding her condition or for health maintenance advice. Please see specific information pulled into  the AVS if appropriate.

## 2022-05-26 ENCOUNTER — OFFICE VISIT (OUTPATIENT)
Dept: OBGYN CLINIC | Age: 32
End: 2022-05-26
Payer: COMMERCIAL

## 2022-05-26 ENCOUNTER — TELEPHONE (OUTPATIENT)
Dept: OBGYN CLINIC | Age: 32
End: 2022-05-26

## 2022-05-26 VITALS — WEIGHT: 130 LBS | HEIGHT: 64 IN | BODY MASS INDEX: 22.2 KG/M2

## 2022-05-26 DIAGNOSIS — Z76.89 ENCOUNTER TO ESTABLISH CARE WITH NEW DOCTOR: ICD-10-CM

## 2022-05-26 DIAGNOSIS — Z30.013 ENCOUNTER FOR INITIAL PRESCRIPTION OF INJECTABLE CONTRACEPTIVE: Primary | ICD-10-CM

## 2022-05-26 DIAGNOSIS — Z30.013 INITIATION OF DEPO PROVERA: Primary | ICD-10-CM

## 2022-05-26 LAB
CONTROL: NORMAL
PREGNANCY TEST URINE, POC: NORMAL

## 2022-05-26 PROCEDURE — 81025 URINE PREGNANCY TEST: CPT | Performed by: OBSTETRICS & GYNECOLOGY

## 2022-05-26 PROCEDURE — 99213 OFFICE O/P EST LOW 20 MIN: CPT | Performed by: OBSTETRICS & GYNECOLOGY

## 2022-05-26 RX ORDER — MEDROXYPROGESTERONE ACETATE 150 MG/ML
150 INJECTION, SUSPENSION INTRAMUSCULAR ONCE
Status: COMPLETED | OUTPATIENT
Start: 2022-05-26 | End: 2022-05-26

## 2022-05-26 RX ORDER — MEDROXYPROGESTERONE ACETATE 150 MG/ML
150 INJECTION, SUSPENSION INTRAMUSCULAR
Qty: 1 ML | Refills: 3 | Status: SHIPPED | OUTPATIENT
Start: 2022-05-26 | End: 2022-10-21

## 2022-05-26 RX ADMIN — MEDROXYPROGESTERONE ACETATE 150 MG: 150 INJECTION, SUSPENSION INTRAMUSCULAR at 15:04

## 2022-05-26 NOTE — TELEPHONE ENCOUNTER
Pt called to reschedule appt , states she just needs her depo injection. Per Dr. Drew Sanchez, Depo Provera ordered, pt told she can come into office to have the injection once she has picked it up.

## 2022-05-26 NOTE — PROGRESS NOTES
Lenora Saleem is a 33 y.o.  who presents today for initiation of Depo Provera. Patient Is s/p  x 4. Multiple medical concerns as noted below. Not a candidate for estrogen containing contraception. Has been on Depo previously with good results. Review of Systems   Constitutional: Negative. HENT: Negative. Eyes: Negative. Respiratory: Negative. Cardiovascular: Negative. Gastrointestinal: Negative. Endocrine: Negative. Genitourinary: Negative. Musculoskeletal: Negative. Skin: Negative. Allergic/Immunologic: Negative. Neurological: Negative. Hematological: Negative. Psychiatric/Behavioral: Negative.         Past Medical History:   Diagnosis Date    ADD (attention deficit disorder)     ADHD (attention deficit hyperactivity disorder)     Chronic back pain     Factor 5 Leiden mutation, heterozygous ([facility] [address]     GERD (gastroesophageal reflux disease)     Hypertension     MTHFR (methylene THF reductase) deficiency and homocystinuria (HCC)        Past Surgical History:   Procedure Laterality Date    CHOLECYSTECTOMY      DILATION AND CURETTAGE OF UTERUS      TONSILLECTOMY AND ADENOIDECTOMY      WISDOM TOOTH EXTRACTION         Family History   Problem Relation Age of Onset    Colon Cancer Neg Hx     Colon Polyps Neg Hx     Liver Cancer Neg Hx     Liver Disease Neg Hx     Esophageal Cancer Neg Hx     Stomach Cancer Neg Hx     Rectal Cancer Neg Hx        Social History     Socioeconomic History    Marital status: Legally      Spouse name: Not on file    Number of children: Not on file    Years of education: Not on file    Highest education level: Not on file   Occupational History    Not on file   Tobacco Use    Smoking status: Former Smoker     Packs/day: 0.25     Types: Cigarettes    Smokeless tobacco: Never Used   Vaping Use    Vaping Use: Never used   Substance and Sexual Activity    Alcohol use: No    Drug use: No     Comment: adderall, takes lungs every 4 hours as needed for Wheezing (Patient not taking: Reported on 3/18/2022), Disp: 1 Inhaler, Rfl: 0    venlafaxine (EFFEXOR) 75 MG tablet, Take 75 mg by mouth 3 times daily (Patient not taking: Reported on 3/18/2022), Disp: , Rfl:     Allergies   Allergen Reactions    Demerol Hcl [Meperidine]      Pt states she becomes violent      Norco [Hydrocodone-Acetaminophen]     Codeine Nausea And Vomiting    Morphine Nausea And Vomiting       Ht 5' 4\" (1.626 m)   Wt 130 lb (59 kg)   BMI 22.31 kg/m²   Physical Exam  Constitutional:       General: She is not in acute distress. Appearance: Normal appearance. She is not ill-appearing or diaphoretic. HENT:      Head: Normocephalic and atraumatic. Eyes:      General: No scleral icterus. Right eye: No discharge. Left eye: No discharge. Extraocular Movements: Extraocular movements intact. Pulmonary:      Effort: Pulmonary effort is normal. No respiratory distress. Musculoskeletal:         General: Normal range of motion. Skin:     Coloration: Skin is not pale. Findings: No erythema or rash. Neurological:      Mental Status: She is alert and oriented to person, place, and time. Psychiatric:         Attention and Perception: Attention and perception normal.         Mood and Affect: Mood and affect normal.         Speech: Speech normal.         Behavior: Behavior normal. Behavior is cooperative. Thought Content: Thought content normal.         Cognition and Memory: Cognition and memory normal.         Judgment: Judgment normal.     POCT urine pregnancy  Collected: 05/26/22 1502   Result status: Final   Value: +   Comment: negative               Assessment   Diagnosis Orders   1. Encounter for initial prescription of injectable contraceptive  POCT urine pregnancy    medroxyPROGESTERone (DEPO-PROVERA) injection 150 mg   2. Encounter to establish care with new doctor         Plan   Depo injection given.   Instructions provided and all questions answered. Return q 90 days for repeat injection    I Js Winkler, am scribing for and in the presence of Dr. Rachael Alvarez. I, Dr. Rachael Alvarez, personally performed the services described in this documentation as scribed by Js Winkler in my presence, and it is both accurate and complete.

## 2022-05-26 NOTE — PROGRESS NOTES
After obtaining consent, and per orders of Dr. Marv Baltazar, injection of Depo given in Left arm by Krystle Alex MA. Patient instructed to remain in clinic for 20 minutes afterwards, and to report any adverse reaction to me immediately.

## 2022-05-28 PROBLEM — E72.11 MTHFR (METHYLENE THF REDUCTASE) DEFICIENCY AND HOMOCYSTINURIA (HCC): Status: ACTIVE | Noted: 2022-05-28

## 2022-05-28 PROBLEM — E72.12 MTHFR (METHYLENE THF REDUCTASE) DEFICIENCY AND HOMOCYSTINURIA (HCC): Status: ACTIVE | Noted: 2022-05-28

## 2022-05-28 PROBLEM — F90.9 ADHD (ATTENTION DEFICIT HYPERACTIVITY DISORDER): Status: ACTIVE | Noted: 2022-05-28

## 2022-05-28 PROBLEM — D68.51 FACTOR 5 LEIDEN MUTATION, HETEROZYGOUS (HCC): Status: ACTIVE | Noted: 2022-05-28

## 2022-05-28 PROBLEM — F98.8 ADD (ATTENTION DEFICIT DISORDER): Status: ACTIVE | Noted: 2022-05-28

## 2022-05-28 ASSESSMENT — ENCOUNTER SYMPTOMS
RESPIRATORY NEGATIVE: 1
EYES NEGATIVE: 1
ALLERGIC/IMMUNOLOGIC NEGATIVE: 1
GASTROINTESTINAL NEGATIVE: 1

## 2022-06-07 ENCOUNTER — OFFICE VISIT (OUTPATIENT)
Dept: FAMILY MEDICINE CLINIC | Facility: CLINIC | Age: 32
End: 2022-06-07

## 2022-06-07 VITALS
HEART RATE: 118 BPM | HEIGHT: 64 IN | SYSTOLIC BLOOD PRESSURE: 118 MMHG | OXYGEN SATURATION: 99 % | TEMPERATURE: 97 F | BODY MASS INDEX: 22.33 KG/M2 | WEIGHT: 130.8 LBS | DIASTOLIC BLOOD PRESSURE: 84 MMHG

## 2022-06-07 DIAGNOSIS — J02.9 PHARYNGITIS, UNSPECIFIED ETIOLOGY: ICD-10-CM

## 2022-06-07 DIAGNOSIS — F90.0 ATTENTION DEFICIT HYPERACTIVITY DISORDER (ADHD), PREDOMINANTLY INATTENTIVE TYPE: Primary | ICD-10-CM

## 2022-06-07 DIAGNOSIS — F41.8 MIXED ANXIETY AND DEPRESSIVE DISORDER: ICD-10-CM

## 2022-06-07 DIAGNOSIS — F39 MOOD DISORDER: ICD-10-CM

## 2022-06-07 PROCEDURE — 99214 OFFICE O/P EST MOD 30 MIN: CPT

## 2022-06-07 PROCEDURE — 96372 THER/PROPH/DIAG INJ SC/IM: CPT

## 2022-06-07 RX ORDER — CEFTRIAXONE 1 G/1
1 INJECTION, POWDER, FOR SOLUTION INTRAMUSCULAR; INTRAVENOUS EVERY 24 HOURS
Status: COMPLETED | OUTPATIENT
Start: 2022-06-07 | End: 2022-06-07

## 2022-06-07 RX ORDER — DEXAMETHASONE SODIUM PHOSPHATE 4 MG/ML
8 INJECTION, SOLUTION INTRA-ARTICULAR; INTRALESIONAL; INTRAMUSCULAR; INTRAVENOUS; SOFT TISSUE ONCE
Status: COMPLETED | OUTPATIENT
Start: 2022-06-07 | End: 2022-06-07

## 2022-06-07 RX ADMIN — DEXAMETHASONE SODIUM PHOSPHATE 8 MG: 4 INJECTION, SOLUTION INTRA-ARTICULAR; INTRALESIONAL; INTRAMUSCULAR; INTRAVENOUS; SOFT TISSUE at 16:11

## 2022-06-07 RX ADMIN — CEFTRIAXONE 1 G: 1 INJECTION, POWDER, FOR SOLUTION INTRAMUSCULAR; INTRAVENOUS at 16:11

## 2022-06-07 NOTE — PROGRESS NOTES
"Chief Complaint  Sore Throat, ADHD, Anxiety, and Depression    Subjective        Raquel MYRIAM Best presents to [facility]  Patient is here today for a ADHD, anxiety and depression follow up.  Patient states medication is wroking well. No questions or concerns for meds. Patient is having complaints of a sore throat for a few days as well    Sore Throat     Anxiety      Her past medical history is significant for depression.   Depression      Objective   Vital Signs:  /84   Pulse 118   Temp 97 °F (36.1 °C)   Ht 162.6 cm (64\")   Wt 59.3 kg (130 lb 12.8 oz)   SpO2 99%   BMI 22.45 kg/m²   Estimated body mass index is 22.45 kg/m² as calculated from the following:    Height as of this encounter: 162.6 cm (64\").    Weight as of this encounter: 59.3 kg (130 lb 12.8 oz).           Physical Exam  Constitutional:       Appearance: Normal appearance. She is well-developed.   HENT:      Head: Normocephalic and atraumatic.      Right Ear: External ear normal.      Left Ear: External ear normal.      Nose: Nose normal. No nasal tenderness or congestion.      Mouth/Throat:      Lips: Pink. No lesions.      Mouth: Mucous membranes are moist. No oral lesions.      Dentition: Normal dentition.      Pharynx: Oropharynx is clear. Posterior oropharyngeal erythema present. No pharyngeal swelling or oropharyngeal exudate.      Tonsils: Tonsillar exudate present. 2+ on the right. 2+ on the left.   Eyes:      General: Lids are normal. Vision grossly intact. No scleral icterus.        Right eye: No discharge.         Left eye: No discharge.      Extraocular Movements: Extraocular movements intact.      Conjunctiva/sclera: Conjunctivae normal.      Right eye: Right conjunctiva is not injected.      Left eye: Left conjunctiva is not injected.      Pupils: Pupils are equal, round, and reactive to light.   Cardiovascular:      Rate and Rhythm: Normal rate and regular rhythm.      Heart sounds: Normal heart " sounds. No murmur heard.    No gallop.   Pulmonary:      Effort: Pulmonary effort is normal.      Breath sounds: Normal breath sounds and air entry. No wheezing, rhonchi or rales.   Musculoskeletal:         General: No tenderness or deformity. Normal range of motion.      Cervical back: Full passive range of motion without pain, normal range of motion and neck supple.      Right lower leg: No edema.      Left lower leg: No edema.   Skin:     General: Skin is warm and dry.      Coloration: Skin is not jaundiced.      Findings: No rash.   Neurological:      Mental Status: She is alert and oriented to person, place, and time.      Cranial Nerves: Cranial nerves are intact.      Sensory: Sensation is intact.      Motor: Motor function is intact.      Coordination: Coordination is intact.      Gait: Gait is intact.   Psychiatric:         Attention and Perception: Attention normal.         Mood and Affect: Mood and affect normal.         Behavior: Behavior is not hyperactive. Behavior is cooperative.         Thought Content: Thought content normal.         Judgment: Judgment normal.        Result Review :                Assessment and Plan   Diagnoses and all orders for this visit:    1. Attention deficit hyperactivity disorder (ADHD), predominantly inattentive type (Primary)    2. Mood disorder (HCC)    3. Mixed anxiety and depressive disorder    4. Pharyngitis, unspecified etiology  -     cefTRIAXone (ROCEPHIN) injection 1 g  -     dexamethasone (DECADRON) injection 8 mg    Patient seen today for ADHD follow-up, anxiety and depression and sore throat.  Patient is doing well on current medication regimen and wishes to continue same.  UDS is up-to-date Mer[place] pending.    Patient states that her children have been sick this past week.  She states that she has developed a sore throat and congestion.  Patient states that she does get strep often.  On exam today patient tonsils are +3 with exudate.  Due to clinical appearance  I do not feel that patient warrants a strep swab and patient would prefer to be treated and not swab today.  Patient is requesting a Rocephin and Decadron shot.  She states that she does the best with these when having strep.    Plan  1. Continue Vyvanse 50mg; uds utd, armin pending  2. Continue Zoloft for anxiety and depression. Mood stable. Patient denies any  HI/SI  3. Decadron 8 and Rocephin 1 in office today.          Follow Up   Return in about 3 months (around 9/7/2022) for ADHD.  Patient was given instructions and counseling regarding her condition or for health maintenance advice. Please see specific information pulled into the AVS if appropriate.

## 2022-06-20 ENCOUNTER — TELEPHONE (OUTPATIENT)
Dept: FAMILY MEDICINE CLINIC | Facility: CLINIC | Age: 32
End: 2022-06-20

## 2022-06-20 NOTE — TELEPHONE ENCOUNTER
Submitted pa via covermymeds for pt's vryalar and received determination back: no pa required dated 6/17/22.

## 2022-07-05 ENCOUNTER — TELEPHONE (OUTPATIENT)
Dept: OBGYN CLINIC | Age: 32
End: 2022-07-05

## 2022-07-05 DIAGNOSIS — F90.0 ATTENTION DEFICIT HYPERACTIVITY DISORDER (ADHD), PREDOMINANTLY INATTENTIVE TYPE: ICD-10-CM

## 2022-07-05 NOTE — TELEPHONE ENCOUNTER
Pt states she has been bleeding for 2 wks mychart sent asking if she had gotten her first depo injection.  Tiffanie/CYRUS

## 2022-07-05 NOTE — TELEPHONE ENCOUNTER
Caller: Raquel Best    Relationship: Self    Best call back number:[phone]    Requested Prescriptions:   Requested Prescriptions     Pending Prescriptions Disp Refills    lisdexamfetamine (Vyvanse) 50 MG capsule 30 capsule 0     Sig: Take 1 capsule by mouth Every Morning        Pharmacy where request should be sent: [facility], [address] - [phone]  - [phone] FX     Does the patient have less than a 3 day supply:  [x] Yes  [] No    Osvaldo Sutherland Rep   07/05/22 10:24 CDT

## 2022-07-15 ENCOUNTER — OFFICE VISIT (OUTPATIENT)
Dept: FAMILY MEDICINE CLINIC | Facility: CLINIC | Age: 32
End: 2022-07-15

## 2022-07-15 VITALS
OXYGEN SATURATION: 99 % | SYSTOLIC BLOOD PRESSURE: 132 MMHG | RESPIRATION RATE: 16 BRPM | HEART RATE: 102 BPM | TEMPERATURE: 97.7 F | BODY MASS INDEX: 21.58 KG/M2 | WEIGHT: 126.4 LBS | DIASTOLIC BLOOD PRESSURE: 82 MMHG | HEIGHT: 64 IN

## 2022-07-15 DIAGNOSIS — F90.0 ATTENTION DEFICIT HYPERACTIVITY DISORDER (ADHD), PREDOMINANTLY INATTENTIVE TYPE: Primary | ICD-10-CM

## 2022-07-15 DIAGNOSIS — F90.2 ATTENTION DEFICIT HYPERACTIVITY DISORDER (ADHD), COMBINED TYPE: Primary | ICD-10-CM

## 2022-07-15 DIAGNOSIS — F41.8 MIXED ANXIETY AND DEPRESSIVE DISORDER: ICD-10-CM

## 2022-07-15 PROCEDURE — 99214 OFFICE O/P EST MOD 30 MIN: CPT | Performed by: NURSE PRACTITIONER

## 2022-07-15 RX ORDER — MEDROXYPROGESTERONE ACETATE 150 MG/ML
INJECTION, SUSPENSION INTRAMUSCULAR
COMMUNITY
Start: 2022-05-26

## 2022-07-15 NOTE — PROGRESS NOTES
"Chief Complaint  ADHD (Med check and refills.  She states she wants to discuss adjusting medication. )    Subjective    History of Present Illness      Patient presents to [facility] for   ADHD/Mood HPI    Visit for:  follow-up. Most recent visit was 3 months ago.  Interim changes to follow up on today: no change in medication  Work/School Performance:  struggling  Cognitive:  unable to focus    Behavior  Hyperactivity: is not hyperactive  Impulsivity: no impulsivity  Tasking: difficulty initiating tasks and difficulty completing tasks    Social  ADHD social/impulsive symptoms:  not impatient    Behavioral health  Behavior: no concerns  Emotional coping: demonstrates feelings of no concerns       Review of Systems    I have reviewed and agree with the HPI and ROS information as above.  Rachel Alegria, APRN     Objective   Vital Signs:   /82   Pulse 102   Temp 97.7 °F (36.5 °C)   Resp 16   Ht 162.6 cm (64\")   Wt 57.3 kg (126 lb 6.4 oz)   SpO2 99%   BMI 21.70 kg/m²     BMI is within normal parameters. No other follow-up for BMI required.      Physical Exam  Constitutional:       Appearance: Normal appearance. She is well-developed.   HENT:      Head: Normocephalic and atraumatic.      Right Ear: External ear normal.      Left Ear: External ear normal.      Nose: Nose normal. No nasal tenderness or congestion.      Mouth/Throat:      Lips: Pink. No lesions.      Mouth: Mucous membranes are moist. No oral lesions.      Dentition: Normal dentition.      Pharynx: Oropharynx is clear. No pharyngeal swelling, oropharyngeal exudate or posterior oropharyngeal erythema.   Eyes:      General: Lids are normal. Vision grossly intact. No scleral icterus.        Right eye: No discharge.         Left eye: No discharge.      Extraocular Movements: Extraocular movements intact.      Conjunctiva/sclera: Conjunctivae normal.      Right eye: Right conjunctiva is not injected.      " Left eye: Left conjunctiva is not injected.      Pupils: Pupils are equal, round, and reactive to light.   Cardiovascular:      Rate and Rhythm: Normal rate and regular rhythm.      Heart sounds: Normal heart sounds. No murmur heard.    No gallop.   Pulmonary:      Effort: Pulmonary effort is normal.      Breath sounds: Normal breath sounds and air entry. No wheezing, rhonchi or rales.   Musculoskeletal:         General: No tenderness or deformity. Normal range of motion.      Cervical back: Full passive range of motion without pain, normal range of motion and neck supple.      Right lower leg: No edema.      Left lower leg: No edema.   Skin:     General: Skin is warm and dry.      Coloration: Skin is not jaundiced.      Findings: No rash.   Neurological:      Mental Status: She is alert and oriented to person, place, and time.      Cranial Nerves: Cranial nerves are intact.      Sensory: Sensation is intact.      Motor: Motor function is intact.      Coordination: Coordination is intact.      Gait: Gait is intact.   Psychiatric:         Attention and Perception: Attention normal.         Mood and Affect: Mood and affect normal.         Behavior: Behavior is not hyperactive. Behavior is cooperative.         Thought Content: Thought content normal.         Judgment: Judgment normal.          Result Review  Data Reviewed:                   Assessment and Plan      Problem List Items Addressed This Visit        Mental Health    Attention deficit hyperactivity disorder (ADHD) - Primary    Relevant Medications    sertraline (ZOLOFT) 50 MG tablet    Mixed anxiety and depressive disorder    Relevant Medications    sertraline (ZOLOFT) 50 MG tablet      Patient comes in today to follow-up on ADHD.  She feels like that her Vyvanse needs to be increased.  She goes to a therapist and they agree.  She states that her anxiety is well controlled.  Wishes to continue the same on the Zoloft.  Drug screen is up-to-date.  Merlin is  clean.  Will increase Vyvanse to 60 mg and follow-up in 1 month.        Follow Up   Return in about 4 weeks (around 8/12/2022).  Patient was given instructions and counseling regarding her condition or for health maintenance advice. Please see specific information pulled into the AVS if appropriate.

## 2022-07-16 ENCOUNTER — NURSE TRIAGE (OUTPATIENT)
Dept: CALL CENTER | Facility: HOSPITAL | Age: 32
End: 2022-07-16

## 2022-07-16 NOTE — TELEPHONE ENCOUNTER
Reason for Disposition  • [1] Caller has NON-URGENT medicine question about med that PCP prescribed AND [2] triager unable to answer question    Additional Information  • Negative: [1] Intentional drug overdose AND [2] suicidal thoughts or ideas  • Negative: Drug overdose and triager unable to answer question  • Negative: Caller requesting information unrelated to medicine  • Negative: Caller requesting information about COVID-19 Vaccine  • Negative: Caller requesting information about Emergency Contraception  • Negative: Caller requesting information about Combined Birth Control Pills  • Negative: Caller requesting information about Progestin Birth Control Pills  • Negative: Caller requesting information about Post-Op pain or medicines  • Negative: Caller requesting a prescription antibiotic (such as Penicillin) for Strep throat and has a positive culture result  • Negative: Caller requesting a prescription anti-viral med (such as Tamiflu) and has influenza (flu)  symptoms  • Negative: Immunization reaction suspected  • Negative: Rash while taking a medicine or within 3 days of stopping it  • Negative: [1] Asthma and [2] having symptoms of asthma (cough, wheezing, etc.)  • Negative: [1] Symptom of illness (e.g., headache, abdominal pain, earache, vomiting) AND [2] more than mild  • Negative: Breastfeeding questions about mother's medicines and diet  • Negative: MORE THAN A DOUBLE DOSE of a prescription or over-the-counter (OTC) drug  • Negative: [1] DOUBLE DOSE (an extra dose or lesser amount) of prescription drug AND [2] any symptoms (e.g., dizziness, nausea, pain, sleepiness)  • Negative: [1] DOUBLE DOSE (an extra dose or lesser amount) of over-the-counter (OTC) drug AND [2] any symptoms (e.g., dizziness, nausea, pain, sleepiness)  • Negative: Took another person's prescription drug  • Negative: [1] DOUBLE DOSE (an extra dose or lesser amount) of prescription drug AND [2] NO symptoms (Exception: a double dose  "of antibiotics)  • Negative: Diabetes drug error or overdose (e.g., took wrong type of insulin or took extra dose)  • Negative: [1] Prescription refill request for ESSENTIAL medicine (i.e., likelihood of harm to patient if not taken) AND [2] triager unable to refill per department policy  • Negative: [1] Prescription not at pharmacy AND [2] was prescribed by PCP recently (Exception: triager has access to EMR and prescription is recorded there. Go to Home Care and confirm for pharmacy.)  • Negative: [1] Pharmacy calling with prescription question AND [2] triager unable to answer question  • Negative: [1] Caller has URGENT medicine question about med that PCP or specialist prescribed AND [2] triager unable to answer question  • Negative: Medicine patch causing local rash or itching  • Negative: [1] Caller has medicine question about med NOT prescribed by PCP AND [2] triager unable to answer question (e.g., compatibility with other med, storage)  • Negative: Prescription request for new medicine (not a refill)  • Negative: Prescription refill request for a CONTROLLED substance (e.g., narcotics, ADHD medicines)  • Negative: [1] Prescription refill request for NON-ESSENTIAL medicine (i.e., no harm to patient if med not taken) AND [2] triager unable to refill per department policy    Answer Assessment - Initial Assessment Questions  1. NAME of MEDICATION: \"What medicine are you calling about?\"      Vyvanse   2. QUESTION: \"What is your question?\" (e.g., medication refill, side effect)      Checking to see if it had been refilled.    3. PRESCRIBING HCP: \"Who prescribed it?\" Reason: if prescribed by specialist, call should be referred to that group.      Erik Darby MD    4. SYMPTOMS: \"Do you have any symptoms?\"      Unknown    5. SEVERITY: If symptoms are present, ask \"Are they mild, moderate or severe?\"      Unknown    6. PREGNANCY:  \"Is there any chance that you are pregnant?\" \"When was your last menstrual period?\"     "  Unknown    Protocols used: MEDICATION QUESTION CALL-ADULT-

## 2022-07-21 DIAGNOSIS — N92.1 MENORRHAGIA WITH IRREGULAR CYCLE: Primary | ICD-10-CM

## 2022-08-12 ENCOUNTER — OFFICE VISIT (OUTPATIENT)
Dept: FAMILY MEDICINE CLINIC | Facility: CLINIC | Age: 32
End: 2022-08-12

## 2022-08-12 VITALS
BODY MASS INDEX: 22.16 KG/M2 | DIASTOLIC BLOOD PRESSURE: 84 MMHG | SYSTOLIC BLOOD PRESSURE: 128 MMHG | TEMPERATURE: 96.9 F | OXYGEN SATURATION: 98 % | HEART RATE: 112 BPM | HEIGHT: 64 IN | WEIGHT: 129.8 LBS

## 2022-08-12 DIAGNOSIS — F90.2 ATTENTION DEFICIT HYPERACTIVITY DISORDER (ADHD), COMBINED TYPE: Primary | ICD-10-CM

## 2022-08-12 DIAGNOSIS — G43.809 OTHER MIGRAINE WITHOUT STATUS MIGRAINOSUS, NOT INTRACTABLE: Primary | ICD-10-CM

## 2022-08-12 DIAGNOSIS — F90.0 ATTENTION DEFICIT HYPERACTIVITY DISORDER (ADHD), PREDOMINANTLY INATTENTIVE TYPE: ICD-10-CM

## 2022-08-12 DIAGNOSIS — F41.8 MIXED ANXIETY AND DEPRESSIVE DISORDER: ICD-10-CM

## 2022-08-12 PROCEDURE — 99213 OFFICE O/P EST LOW 20 MIN: CPT | Performed by: NURSE PRACTITIONER

## 2022-08-12 RX ORDER — SUMATRIPTAN 50 MG/1
TABLET, FILM COATED ORAL
Qty: 9 TABLET | Refills: 2 | Status: SHIPPED | OUTPATIENT
Start: 2022-08-12 | End: 2023-02-28 | Stop reason: SDUPTHER

## 2022-08-12 NOTE — PROGRESS NOTES
"Chief Complaint  ADHD    Subjective    History of Present Illness      Patient presents to [facility] for   FU Taking Vyvance and sertraline (ZOLOFT) 50 MG tablet states doing well.       Review of Systems    I have reviewed and agree with the HPI and ROS information as above.  Rachel Romerodenise Alegria, VON     Objective   Vital Signs:   /84   Pulse 112   Temp 96.9 °F (36.1 °C)   Ht 162.6 cm (64\")   Wt 58.9 kg (129 lb 12.8 oz)   SpO2 98%   BMI 22.28 kg/m²     BMI is within normal parameters. No other follow-up for BMI required.      Physical Exam  Constitutional:       Appearance: Normal appearance. She is well-developed.   HENT:      Head: Normocephalic and atraumatic.      Right Ear: External ear normal.      Left Ear: External ear normal.      Nose: Nose normal. No nasal tenderness or congestion.      Mouth/Throat:      Lips: Pink. No lesions.      Mouth: Mucous membranes are moist. No oral lesions.      Dentition: Normal dentition.      Pharynx: Oropharynx is clear. No pharyngeal swelling, oropharyngeal exudate or posterior oropharyngeal erythema.   Eyes:      General: Lids are normal. Vision grossly intact. No scleral icterus.        Right eye: No discharge.         Left eye: No discharge.      Extraocular Movements: Extraocular movements intact.      Conjunctiva/sclera: Conjunctivae normal.      Right eye: Right conjunctiva is not injected.      Left eye: Left conjunctiva is not injected.      Pupils: Pupils are equal, round, and reactive to light.   Cardiovascular:      Rate and Rhythm: Normal rate and regular rhythm.      Heart sounds: Normal heart sounds. No murmur heard.    No gallop.   Pulmonary:      Effort: Pulmonary effort is normal.      Breath sounds: Normal breath sounds and air entry. No wheezing, rhonchi or rales.   Musculoskeletal:         General: No tenderness or deformity. Normal range of motion.      Cervical back: Full passive range of motion " without pain, normal range of motion and neck supple.      Right lower leg: No edema.      Left lower leg: No edema.   Skin:     General: Skin is warm and dry.      Coloration: Skin is not jaundiced.      Findings: No rash.   Neurological:      Mental Status: She is alert and oriented to person, place, and time.      Cranial Nerves: Cranial nerves are intact.      Sensory: Sensation is intact.      Motor: Motor function is intact.      Coordination: Coordination is intact.      Gait: Gait is intact.   Psychiatric:         Attention and Perception: Attention normal.         Mood and Affect: Mood and affect normal.         Behavior: Behavior is not hyperactive. Behavior is cooperative.         Thought Content: Thought content normal.         Judgment: Judgment normal.            Result Review  Data Reviewed:                   Assessment and Plan      Problem List Items Addressed This Visit        Mental Health    Attention deficit hyperactivity disorder (ADHD)    Relevant Medications    sertraline (ZOLOFT) 50 MG tablet    Mixed anxiety and depressive disorder    Relevant Medications    sertraline (ZOLOFT) 50 MG tablet      Other Visit Diagnoses     Other migraine without status migrainosus, not intractable    -  Primary    Relevant Medications    SUMAtriptan (Imitrex) 50 MG tablet    sertraline (ZOLOFT) 50 MG tablet      Patient comes in today to follow-up on ADHD.  Symptoms are well controlled since increasing the dose.  Wishes to continue same.  Patient is also requesting a refill on her Imitrex.  As per is clean.  Drug screen is up-to-date.        Follow Up   Return in about 3 months (around 11/12/2022).  Patient was given instructions and counseling regarding her condition or for health maintenance advice. Please see specific information pulled into the AVS if appropriate.

## 2022-09-09 DIAGNOSIS — F90.2 ATTENTION DEFICIT HYPERACTIVITY DISORDER (ADHD), COMBINED TYPE: ICD-10-CM

## 2022-09-09 NOTE — TELEPHONE ENCOUNTER
Caller: Raquel Best    Relationship: Self    Best call back number:[phone]    Requested Prescriptions:   Requested Prescriptions     Pending Prescriptions Disp Refills   • lisdexamfetamine (Vyvanse) 60 MG capsule 30 capsule 0     Sig: Take 1 capsule by mouth Every Morning for 30 days        Pharmacy where request should be sent: [facility], [address] - [phone]  - [phone] FX     Additional details provided by patient    Does the patient have less than a 3 day supply:  [] Yes  [x] No    Osvaldo MONCADA   09/09/22 15:08 CDT          Status post trach and PEG placement on 10/3/2021  Trach downsized on 10/25/2021 to #6 nonfenestrated/noncuffed  Monitor respiratory status  Continues to require suctioning per nursing staff

## 2022-09-09 NOTE — TELEPHONE ENCOUNTER
Called patient and informed of script is already on hold at pharmacy so refill request is denied. KOBY.

## 2022-09-23 ENCOUNTER — PROCEDURE VISIT (OUTPATIENT)
Dept: OBGYN CLINIC | Age: 32
End: 2022-09-23
Payer: COMMERCIAL

## 2022-09-23 VITALS
WEIGHT: 122 LBS | SYSTOLIC BLOOD PRESSURE: 124 MMHG | BODY MASS INDEX: 20.83 KG/M2 | DIASTOLIC BLOOD PRESSURE: 72 MMHG | HEIGHT: 64 IN

## 2022-09-23 DIAGNOSIS — Z12.4 SCREENING FOR CERVICAL CANCER: ICD-10-CM

## 2022-09-23 DIAGNOSIS — Z30.430 ENCOUNTER FOR IUD INSERTION: Primary | ICD-10-CM

## 2022-09-23 DIAGNOSIS — Z11.51 SCREENING FOR HPV (HUMAN PAPILLOMAVIRUS): ICD-10-CM

## 2022-09-23 LAB
CONTROL: NORMAL
PREGNANCY TEST URINE, POC: NORMAL

## 2022-09-23 PROCEDURE — 58300 INSERT INTRAUTERINE DEVICE: CPT | Performed by: OBSTETRICS & GYNECOLOGY

## 2022-09-23 PROCEDURE — 81025 URINE PREGNANCY TEST: CPT | Performed by: OBSTETRICS & GYNECOLOGY

## 2022-09-23 RX ORDER — LISDEXAMFETAMINE DIMESYLATE 60 MG/1
CAPSULE ORAL
COMMUNITY

## 2022-09-28 LAB
HPV COMMENT: NORMAL
HPV TYPE 16: NOT DETECTED
HPV TYPE 18: NOT DETECTED
HPVOH (OTHER TYPES): NOT DETECTED

## 2022-10-07 DIAGNOSIS — F90.2 ATTENTION DEFICIT HYPERACTIVITY DISORDER (ADHD), COMBINED TYPE: ICD-10-CM

## 2022-10-07 NOTE — TELEPHONE ENCOUNTER
Caller: Raquel Best MYRIAM    Relationship: Self    Best call back number: [phone]    Requested Prescriptions:   Requested Prescriptions     Pending Prescriptions Disp Refills   • lisdexamfetamine (Vyvanse) 60 MG capsule 30 capsule 0     Sig: Take 1 capsule by mouth Every Morning for 30 days        Pharmacy where request should be sent: [facility] [address] - [phone]  - [phone] FX     Please advise when and if medication can be called in. If unable to be filled, please advise with callback at the phone number listed above.    Thank you,  Jewel Carmichael, PCT     \

## 2022-10-21 ENCOUNTER — TELEMEDICINE (OUTPATIENT)
Dept: OBGYN CLINIC | Age: 32
End: 2022-10-21
Payer: COMMERCIAL

## 2022-10-21 DIAGNOSIS — Z30.431 IUD CHECK UP: Primary | ICD-10-CM

## 2022-10-21 DIAGNOSIS — N92.1 BREAKTHROUGH BLEEDING WITH IUD: ICD-10-CM

## 2022-10-21 DIAGNOSIS — Z97.5 BREAKTHROUGH BLEEDING WITH IUD: ICD-10-CM

## 2022-10-21 DIAGNOSIS — N93.9 ABNORMAL UTERINE BLEEDING (AUB): ICD-10-CM

## 2022-10-21 PROCEDURE — 99214 OFFICE O/P EST MOD 30 MIN: CPT | Performed by: NURSE PRACTITIONER

## 2022-10-21 ASSESSMENT — ENCOUNTER SYMPTOMS
RESPIRATORY NEGATIVE: 1
GASTROINTESTINAL NEGATIVE: 1
ALLERGIC/IMMUNOLOGIC NEGATIVE: 1
EYES NEGATIVE: 1

## 2022-10-21 NOTE — PROGRESS NOTES
[facility]  Nurse Practitioner Office Note  TELEHEALTH EVALUATION -- Audio/Visual (During DMARA-66 public health emergency)    Krishna Beavers is a 33 y.o. female who presents today for her medical conditions/ complaints as noted below. Chief Complaint   Patient presents with    Other     Iud check up after insertion    Vaginal Bleeding         HPI  Pt following up on iud placement. Did finally have 1 week of no bleeding but has been bleeding for most part since . Patient Active Problem List   Diagnosis    Anemia    31 weeks gestation of pregnancy    37 weeks gestation of pregnancy    Uterine contractions during pregnancy     (normal spontaneous vaginal delivery)    ADD (attention deficit disorder)    ADHD (attention deficit hyperactivity disorder)    Factor 5 Leiden mutation, heterozygous (HCC)    MTHFR (methylene THF reductase) deficiency and homocystinuria ([address]       No LMP recorded.   [id number]    Past Medical History:   Diagnosis Date    ADD (attention deficit disorder)     ADHD (attention deficit hyperactivity disorder)     Chronic back pain     Factor 5 Leiden mutation, heterozygous (HCC)     GERD (gastroesophageal reflux disease)     Hypertension     MTHFR (methylene THF reductase) deficiency and homocystinuria (HCC)      Past Surgical History:   Procedure Laterality Date    CHOLECYSTECTOMY      DILATION AND CURETTAGE OF UTERUS      TONSILLECTOMY AND ADENOIDECTOMY      WISDOM TOOTH EXTRACTION       Family History   Problem Relation Age of Onset    Colon Cancer Neg Hx     Colon Polyps Neg Hx     Liver Cancer Neg Hx     Liver Disease Neg Hx     Esophageal Cancer Neg Hx     Stomach Cancer Neg Hx     Rectal Cancer Neg Hx      Social History     Tobacco Use    Smoking status: Former     Packs/day: 0.25     Types: Cigarettes    Smokeless tobacco: Never   Substance Use Topics    Alcohol use: No       Current Outpatient Medications   Medication Sig Dispense Refill    Lisdexamfetamine Dimesylate (VYVANSE) 60 MG CAPS Take by mouth. sertraline (ZOLOFT) 50 MG tablet Take 50 mg by mouth daily      medroxyPROGESTERone (DEPO-PROVERA) 150 MG/ML injection Inject 1 mL into the muscle every 3 months (Patient not taking: Reported on 9/23/2022) 1 mL 3    lurasidone (LATUDA) 40 MG TABS tablet Take 20 mg by mouth daily (Patient not taking: No sig reported)      fluticasone (FLONASE) 50 MCG/ACT nasal spray 1 spray by Each Nostril route daily (Patient not taking: No sig reported) 1 Bottle 0    albuterol sulfate HFA (VENTOLIN HFA) 108 (90 Base) MCG/ACT inhaler Inhale 2 puffs into the lungs every 4 hours as needed for Wheezing (Patient not taking: No sig reported) 1 Inhaler 0    venlafaxine (EFFEXOR) 75 MG tablet Take 75 mg by mouth 3 times daily (Patient not taking: No sig reported)       No current facility-administered medications for this visit. Allergies   Allergen Reactions    Demerol Hcl [Meperidine]      Pt states she becomes violent      Norco [Hydrocodone-Acetaminophen]     Codeine Nausea And Vomiting    Morphine Nausea And Vomiting     There were no vitals filed for this visit. There is no height or weight on file to calculate BMI. Review of Systems   Constitutional: Negative. HENT: Negative. Eyes: Negative. Respiratory: Negative. Cardiovascular: Negative. Gastrointestinal: Negative. Endocrine: Negative. Genitourinary:  Positive for menstrual problem and vaginal bleeding. Negative for difficulty urinating, dyspareunia, dysuria, enuresis, frequency, hematuria, pelvic pain, urgency and vaginal discharge. Musculoskeletal: Negative. Skin: Negative. Allergic/Immunologic: Negative. Neurological: Negative. Hematological: Negative. Psychiatric/Behavioral: Negative. Due to this being a TeleHealth encounter, evaluation of the following organ systems is limited: Vitals/Constitutional/EENT/Resp/CV/GI//MS/Neuro/Skin/Heme-Lymph-Imm.     Physical Exam  Constitutional:       General: She is not in acute distress. Appearance: She is well-developed. She is not diaphoretic. HENT:      Head: Normocephalic and atraumatic. Eyes:      Conjunctiva/sclera: Conjunctivae normal.      Pupils: Pupils are equal, round, and reactive to light. Pulmonary:      Effort: Pulmonary effort is normal.   Abdominal:      Tenderness: There is no guarding. Musculoskeletal:         General: Normal range of motion. Cervical back: Normal range of motion. Comments: Normal ROM in all 4 extremities; normal gait   Skin:     General: Skin is warm and dry. Neurological:      Mental Status: She is alert and oriented to person, place, and time. Motor: No abnormal muscle tone. Coordination: Coordination normal.   Psychiatric:         Behavior: Behavior normal.        Diagnosis Orders   1. IUD check up  [address] [place] [address]      2. Breakthrough bleeding with IUD  US NON OB TRANSVAGINAL      3. Abnormal uterine bleeding (AUB)  US NON OB TRANSVAGINAL    CBC with Auto Differential    TSH    T4, Free    HCG, Quantitative, Pregnancy    Culture, Urine    Urinalysis    Chlamydia/N. Gonorrhoeae/T. Vaginalis          MEDICATIONS:  No orders of the defined types were placed in this encounter. ORDERS:  Orders Placed This Encounter   Procedures    Culture, Urine    Chlamydia/N. Gonorrhoeae/T. Vaginalis    US NON OB TRANSVAGINAL    CBC with Auto Differential    TSH    T4, Free    HCG, Quantitative, Pregnancy    Urinalysis       PLAN:  To come in for labs and urine as well as US for her continued complaints     Pursuant to the emergency declaration under the [address] and the [facility], [facility] waiver authority and the [employer] Act, this Virtual  Visit was conducted, with patient's consent, to reduce the patient's risk of exposure to COVID-19 and provide continuity of care for an established patient. Services were provided through a video synchronous discussion virtually to substitute for in-person clinic visit.

## 2022-10-25 ENCOUNTER — OFFICE VISIT (OUTPATIENT)
Dept: FAMILY MEDICINE CLINIC | Facility: CLINIC | Age: 32
End: 2022-10-25

## 2022-10-25 ENCOUNTER — LAB (OUTPATIENT)
Dept: LAB | Facility: HOSPITAL | Age: 32
End: 2022-10-25

## 2022-10-25 VITALS
DIASTOLIC BLOOD PRESSURE: 76 MMHG | HEIGHT: 64 IN | SYSTOLIC BLOOD PRESSURE: 113 MMHG | WEIGHT: 128 LBS | TEMPERATURE: 98.2 F | BODY MASS INDEX: 21.85 KG/M2 | HEART RATE: 92 BPM | OXYGEN SATURATION: 100 %

## 2022-10-25 DIAGNOSIS — J98.8 VIRAL RESPIRATORY ILLNESS: ICD-10-CM

## 2022-10-25 DIAGNOSIS — R50.9 FEVER, UNSPECIFIED FEVER CAUSE: Primary | ICD-10-CM

## 2022-10-25 DIAGNOSIS — B97.89 VIRAL RESPIRATORY ILLNESS: ICD-10-CM

## 2022-10-25 DIAGNOSIS — R50.9 FEVER, UNSPECIFIED FEVER CAUSE: ICD-10-CM

## 2022-10-25 LAB
FLUAV AG NPH QL: NEGATIVE
FLUBV AG NPH QL IA: NEGATIVE

## 2022-10-25 PROCEDURE — 87804 INFLUENZA ASSAY W/OPTIC: CPT

## 2022-10-25 PROCEDURE — 99213 OFFICE O/P EST LOW 20 MIN: CPT | Performed by: NURSE PRACTITIONER

## 2022-10-25 NOTE — PROGRESS NOTES
"Chief Complaint  Cough, Fever, Sore Throat, and Nausea    Subjective        Raquel MYRIAM Best presents to [facility]  History of Present Illness  Started getting sick last night with sinus congestion.   Objective   Vital Signs:  /76   Pulse 92   Temp 98.2 °F (36.8 °C)   Ht 162.6 cm (64\")   Wt 58.1 kg (128 lb)   SpO2 100%   BMI 21.97 kg/m²   Estimated body mass index is 21.97 kg/m² as calculated from the following:    Height as of this encounter: 162.6 cm (64\").    Weight as of this encounter: 58.1 kg (128 lb).    BMI is within normal parameters. No other follow-up for BMI required.      Physical Exam  Vitals and nursing note reviewed.   Constitutional:       Appearance: Normal appearance. She is well-developed.   HENT:      Head: Normocephalic and atraumatic.      Right Ear: Tympanic membrane, ear canal and external ear normal.      Left Ear: Tympanic membrane, ear canal and external ear normal.      Nose: Congestion present. No septal deviation or nasal tenderness.      Mouth/Throat:      Lips: Pink. No lesions.      Mouth: Mucous membranes are moist. No oral lesions.      Dentition: Normal dentition.      Pharynx: Oropharynx is clear. No pharyngeal swelling, oropharyngeal exudate or posterior oropharyngeal erythema.   Eyes:      General: Lids are normal. Vision grossly intact. No scleral icterus.        Right eye: No discharge.         Left eye: No discharge.      Extraocular Movements: Extraocular movements intact.      Conjunctiva/sclera: Conjunctivae normal.      Right eye: Right conjunctiva is not injected.      Left eye: Left conjunctiva is not injected.      Pupils: Pupils are equal, round, and reactive to light.   Neck:      Thyroid: No thyroid mass.      Trachea: Trachea normal.   Cardiovascular:      Rate and Rhythm: Normal rate and regular rhythm.      Heart sounds: Normal heart sounds. No murmur heard.    No gallop.   Pulmonary:      Effort: Pulmonary effort is " normal.      Breath sounds: Normal breath sounds and air entry. No wheezing, rhonchi or rales.   Musculoskeletal:         General: No tenderness or deformity. Normal range of motion.      Cervical back: Full passive range of motion without pain, normal range of motion and neck supple.      Thoracic back: Normal.      Right lower leg: No edema.      Left lower leg: No edema.   Skin:     General: Skin is warm and dry.      Coloration: Skin is not jaundiced.      Findings: No rash.   Neurological:      Mental Status: She is alert and oriented to person, place, and time.      Cranial Nerves: Cranial nerves are intact.      Sensory: Sensation is intact.      Motor: Motor function is intact.      Coordination: Coordination is intact.      Gait: Gait is intact.      Deep Tendon Reflexes: Reflexes are normal and symmetric.   Psychiatric:         Mood and Affect: Mood and affect normal.         Behavior: Behavior normal.         Judgment: Judgment normal.        Result Review :                Assessment and Plan   Diagnoses and all orders for this visit:    1. Fever, unspecified fever cause (Primary)  -     Influenza Antigen, Rapid - Swab, Nasopharynx; Future    2. Viral respiratory illness      Kids have flu and and flu B. Mom does not have a fever yet, but explained to assume she is positive. All quarantine at this time.          Follow Up   Return if symptoms worsen or fail to improve.  Patient was given instructions and counseling regarding her condition or for health maintenance advice. Please see specific information pulled into the AVS if appropriate.

## 2022-11-02 ENCOUNTER — OFFICE VISIT (OUTPATIENT)
Dept: FAMILY MEDICINE CLINIC | Facility: CLINIC | Age: 32
End: 2022-11-02

## 2022-11-02 VITALS
HEART RATE: 96 BPM | DIASTOLIC BLOOD PRESSURE: 81 MMHG | TEMPERATURE: 97.9 F | HEIGHT: 64 IN | WEIGHT: 130.2 LBS | BODY MASS INDEX: 22.23 KG/M2 | OXYGEN SATURATION: 99 % | SYSTOLIC BLOOD PRESSURE: 113 MMHG

## 2022-11-02 DIAGNOSIS — F90.2 ATTENTION DEFICIT HYPERACTIVITY DISORDER (ADHD), COMBINED TYPE: Primary | ICD-10-CM

## 2022-11-02 DIAGNOSIS — F90.0 ATTENTION DEFICIT HYPERACTIVITY DISORDER (ADHD), PREDOMINANTLY INATTENTIVE TYPE: ICD-10-CM

## 2022-11-02 DIAGNOSIS — J02.9 ACUTE PHARYNGITIS, UNSPECIFIED ETIOLOGY: Primary | ICD-10-CM

## 2022-11-02 PROCEDURE — 99214 OFFICE O/P EST MOD 30 MIN: CPT | Performed by: NURSE PRACTITIONER

## 2022-11-02 RX ORDER — AZITHROMYCIN 250 MG/1
TABLET, FILM COATED ORAL
Qty: 6 TABLET | Refills: 0 | Status: SHIPPED | OUTPATIENT
Start: 2022-11-02 | End: 2023-02-28

## 2022-11-02 NOTE — PROGRESS NOTES
"Chief Complaint  Sore Throat, Cough, Nasal Congestion, Fatigue, and ADD    Subjective    History of Present Illness      Patient presents to [facility] for   History of Present Illness  Pt complains of strep throat. Also following up on ADD.        Review of Systems    I have reviewed and agree with the HPI and ROS information as above.  Rachel RomeroVON Lea     Objective   Vital Signs:   /81 (BP Location: Right leg, Patient Position: Sitting, Cuff Size: Adult)   Pulse 96   Temp 97.9 °F (36.6 °C) (Temporal)   Ht 162.6 cm (64.02\")   Wt 59.1 kg (130 lb 3.2 oz)   SpO2 99%   BMI 22.34 kg/m²     BMI is within normal parameters. No other follow-up for BMI required.      Physical Exam  Constitutional:       Appearance: Normal appearance. She is well-developed.   HENT:      Head: Normocephalic and atraumatic.      Right Ear: External ear normal.      Left Ear: External ear normal.      Nose: Nose normal. No nasal tenderness or congestion.      Mouth/Throat:      Lips: Pink. No lesions.      Mouth: Mucous membranes are moist. No oral lesions.      Dentition: Normal dentition.      Pharynx: Oropharynx is clear. Posterior oropharyngeal erythema present. No pharyngeal swelling or oropharyngeal exudate.   Eyes:      General: Lids are normal. Vision grossly intact. No scleral icterus.        Right eye: No discharge.         Left eye: No discharge.      Extraocular Movements: Extraocular movements intact.      Conjunctiva/sclera: Conjunctivae normal.      Right eye: Right conjunctiva is not injected.      Left eye: Left conjunctiva is not injected.      Pupils: Pupils are equal, round, and reactive to light.   Cardiovascular:      Rate and Rhythm: Normal rate and regular rhythm.      Heart sounds: Normal heart sounds. No murmur heard.    No gallop.   Pulmonary:      Effort: Pulmonary effort is normal.      Breath sounds: Normal breath sounds and air entry. No wheezing, rhonchi " or rales.   Musculoskeletal:         General: No tenderness or deformity. Normal range of motion.      Cervical back: Full passive range of motion without pain, normal range of motion and neck supple.      Right lower leg: No edema.      Left lower leg: No edema.   Skin:     General: Skin is warm and dry.      Coloration: Skin is not jaundiced.      Findings: No rash.   Neurological:      Mental Status: She is alert and oriented to person, place, and time.      Cranial Nerves: Cranial nerves are intact.      Sensory: Sensation is intact.      Motor: Motor function is intact.      Coordination: Coordination is intact.      Gait: Gait is intact.   Psychiatric:         Attention and Perception: Attention normal.         Mood and Affect: Mood and affect normal.         Behavior: Behavior is not hyperactive. Behavior is cooperative.         Thought Content: Thought content normal.         Judgment: Judgment normal.          Result Review  Data Reviewed:                   Assessment and Plan      Problem List Items Addressed This Visit        Mental Health    Attention deficit hyperactivity disorder (ADHD)   Other Visit Diagnoses     Acute pharyngitis, unspecified etiology    -  Primary    Relevant Medications    azithromycin (Zithromax Z-Zev) 250 MG tablet        Patient comes in today complaining of what she describes as strep throat.  She states that she had the flu when her kids had it last week.  Anytime she is sick then she usually will come down with strep after the fact.  She declines any testing.  Request treatment as above.    Patient also request to follow-up on ADD as she is due for that next week.  Merlin is clean.  Drug screen is up-to-date.      Follow Up   Return in about 3 months (around 2/2/2023).  Patient was given instructions and counseling regarding her condition or for health maintenance advice. Please see specific information pulled into the AVS if appropriate.

## 2022-11-30 ENCOUNTER — TELEPHONE (OUTPATIENT)
Dept: FAMILY MEDICINE CLINIC | Facility: CLINIC | Age: 32
End: 2022-11-30

## 2022-11-30 DIAGNOSIS — R11.2 NAUSEA AND VOMITING, UNSPECIFIED VOMITING TYPE: Primary | ICD-10-CM

## 2022-11-30 RX ORDER — ONDANSETRON 8 MG/1
8 TABLET, ORALLY DISINTEGRATING ORAL EVERY 8 HOURS PRN
Qty: 30 TABLET | Refills: 0 | Status: SHIPPED | OUTPATIENT
Start: 2022-11-30

## 2022-11-30 NOTE — TELEPHONE ENCOUNTER
Caller: Raquel Best    Relationship: Self    Best call back number: [phone]    What medication are you requesting:   SOMETHING FOR NAUSEA    What are your current symptoms:   NAUSEA AND VOMITING     How long have you been experiencing symptoms:   SUNDAY    Have you had these symptoms before:    [] Yes  [x] No    Have you been treated for these symptoms before:   [] Yes  [x] No    If a prescription is needed, what is your preferred pharmacy and phone number: [id number] - [place], [place] - [address] [facility] [phone] [facility] [phone]      Additional notes:  N/A

## 2022-11-30 NOTE — TELEPHONE ENCOUNTER
Caller: Raquel Best    Relationship to patient: Self    Best call back number: [phone]    Patient is needing:   PATIENT CALLED AND WANTED TO CHECK THE STATUS OF THE Rx REQUESTED..

## 2022-11-30 NOTE — TELEPHONE ENCOUNTER
Called pt to get more information with symptoms and there was o answer or voicemail. Pt states in the message that he has been vomiting with nausea since Sunday and wanted to know if something could be called in.

## 2023-01-09 DIAGNOSIS — F90.2 ATTENTION DEFICIT HYPERACTIVITY DISORDER (ADHD), COMBINED TYPE: ICD-10-CM

## 2023-01-09 NOTE — TELEPHONE ENCOUNTER
Caller: JAXON HAN     Relationship: SELF   Best call back number:    [phone] (Home)         Requested Prescriptions:    lisdexamfetamine (Vyvanse) 60 MG capsule   Pharmacy where request should be sent:    [facility], [facility] - [place] [address] - [phone]  - [phone] FX  [phone]  Additional details provided by patient: NONE   Does the patient have less than a 3 day supply:  [x] Yes  [] No    Would you like a call back once the refill request has been completed: [x] Yes [] No    If the office needs to give you a call back, can they leave a voicemail: [x] Yes [] No    Osvaldo Yan Rep   01/09/23 12:10 CST

## 2023-02-06 RX ORDER — LISDEXAMFETAMINE DIMESYLATE 60 MG/1
CAPSULE ORAL
Qty: 30 CAPSULE | Refills: 0 | OUTPATIENT
Start: 2023-02-06

## 2023-02-06 NOTE — TELEPHONE ENCOUNTER
Caller: Raquel Best MYRIAM    Relationship: Self    Best call back number: [phone]    Requested Prescriptions:   Requested Prescriptions     Pending Prescriptions Disp Refills   • Vyvanse 60 MG capsule [Pharmacy Med Name: VYVANSE 60MG CAPSULE] 30 capsule 0     Sig: TAKE (1) CAPSULE BY MOUTH DAILY IN THE MORNING. MAY FILL 11/29/22.        Pharmacy where request should be sent: [facility] - [phone] [facility] [phone] FX     Additional details provided by patient:   PATIENT IS RUNNING LOW ON MEDICATION     Does the patient have less than a 3 day supply:  [x] Yes  [] No    Would you like a call back once the refill request has been completed: [x] Yes [] No    If the office needs to give you a call back, can they leave a voicemail: [x] Yes [] No    Roxane Pennington, PCT   02/06/23 16:06 CST

## 2023-02-07 ENCOUNTER — TELEPHONE (OUTPATIENT)
Dept: FAMILY MEDICINE CLINIC | Facility: CLINIC | Age: 33
End: 2023-02-07
Payer: COMMERCIAL

## 2023-02-07 DIAGNOSIS — F90.2 ATTENTION DEFICIT HYPERACTIVITY DISORDER (ADHD), COMBINED TYPE: ICD-10-CM

## 2023-02-07 NOTE — TELEPHONE ENCOUNTER
3rd routed to Dr. Darby for approval.    Attempted to return patient phone call to inform that 3rd had been routed to Dr. Darby for approval. Voicemail is full. HUB MAY READ.

## 2023-02-07 NOTE — TELEPHONE ENCOUNTER
Caller: Raquel Best    Relationship: Self    Best call back number: [phone]    Requested Prescriptions:   Requested Prescriptions     Pending Prescriptions Disp Refills   • lisdexamfetamine (Vyvanse) 60 MG capsule 30 capsule 0     Sig: Take 1 capsule by mouth Every Morning for 30 days        Pharmacy where request should be sent: [facility], [address] - [phone]  - [phone] FX     Additional details provided by patient:     Does the patient have less than a 3 day supply:  [x] Yes  [] No    Would you like a call back once the refill request has been completed: [x] Yes [] No    If the office needs to give you a call back, can they leave a voicemail: [x] Yes [] No    Osvaldo Martin Rep   02/07/23 12:48 CST

## 2023-02-27 ENCOUNTER — TELEMEDICINE (OUTPATIENT)
Dept: FAMILY MEDICINE CLINIC | Facility: CLINIC | Age: 33
End: 2023-02-27
Payer: COMMERCIAL

## 2023-02-27 VITALS — HEIGHT: 64 IN | WEIGHT: 130 LBS | BODY MASS INDEX: 22.2 KG/M2

## 2023-02-27 DIAGNOSIS — F41.8 MIXED ANXIETY AND DEPRESSIVE DISORDER: ICD-10-CM

## 2023-02-27 DIAGNOSIS — F90.2 ATTENTION DEFICIT HYPERACTIVITY DISORDER (ADHD), COMBINED TYPE: ICD-10-CM

## 2023-02-27 DIAGNOSIS — F90.0 ATTENTION DEFICIT HYPERACTIVITY DISORDER (ADHD), PREDOMINANTLY INATTENTIVE TYPE: Primary | ICD-10-CM

## 2023-02-27 PROCEDURE — 99214 OFFICE O/P EST MOD 30 MIN: CPT | Performed by: NURSE PRACTITIONER

## 2023-02-27 NOTE — PROGRESS NOTES
"You have chosen to receive care through a telehealth visit.  Do you consent to use a video/audio connection for your medical care today? Yes   This visit was completed by Telehealth using a computer. The location of the provider is [address] [place] [address]. The location of the patient is currently sitting in her car.     Chief Complaint  ADHD and Anxiety    Subjective    History of Present Illness      Patient presents to [facility] for   History of Present Illness  Patient is being seen via telehealth today for a follow-up on her ADHD medication and her anxiety medication.  She states that she is doing well and denies any concerns at this time.   Objective   Vital Signs:   Ht 162.6 cm (64\")   Wt 59 kg (130 lb)   BMI 22.31 kg/m²       Physical Exam  Vitals and nursing note reviewed.   Constitutional:       Appearance: Normal appearance. She is well-developed.   HENT:      Head: Normocephalic and atraumatic.      Mouth/Throat:      Lips: Pink. No lesions.   Eyes:      General: Lids are normal. Vision grossly intact.      Conjunctiva/sclera: Conjunctivae normal.      Right eye: Right conjunctiva is not injected.      Left eye: Left conjunctiva is not injected.   Pulmonary:      Effort: Pulmonary effort is normal.   Musculoskeletal:         General: Normal range of motion.      Cervical back: Full passive range of motion without pain, normal range of motion and neck supple.   Skin:     General: Skin is dry.   Neurological:      Mental Status: She is alert and oriented to person, place, and time.      Motor: Motor function is intact.   Psychiatric:         Mood and Affect: Mood and affect normal.         Judgment: Judgment normal.        Result Review  Data Reviewed:                   Assessment and Plan    Problem List Items Addressed This Visit        Mental Health    Attention deficit hyperactivity disorder (ADHD) - Primary    Relevant Medications    sertraline (ZOLOFT) 50 " MG tablet    Mixed anxiety and depressive disorder    Relevant Medications    sertraline (ZOLOFT) 50 MG tablet   Patient is being seen via telehealth today for a follow-up on her ADHD medication and her anxiety medication.  She states that she is doing well and denies any concerns at this time.  Urine drug screen is up-to-date and appropriate.  Follow-up 3 months.          Follow Up   Return in about 3 months (around 5/27/2023) for ADHD follow up.  Patient was given instructions and counseling regarding her condition or for health maintenance advice. Please see specific information pulled into the AVS if appropriate.

## 2023-02-28 ENCOUNTER — OFFICE VISIT (OUTPATIENT)
Dept: FAMILY MEDICINE CLINIC | Facility: CLINIC | Age: 33
End: 2023-02-28
Payer: COMMERCIAL

## 2023-02-28 VITALS
SYSTOLIC BLOOD PRESSURE: 118 MMHG | BODY MASS INDEX: 21.68 KG/M2 | WEIGHT: 127 LBS | HEIGHT: 64 IN | OXYGEN SATURATION: 100 % | DIASTOLIC BLOOD PRESSURE: 85 MMHG | HEART RATE: 96 BPM

## 2023-02-28 DIAGNOSIS — G43.809 OTHER MIGRAINE WITHOUT STATUS MIGRAINOSUS, NOT INTRACTABLE: ICD-10-CM

## 2023-02-28 PROCEDURE — 99213 OFFICE O/P EST LOW 20 MIN: CPT | Performed by: NURSE PRACTITIONER

## 2023-02-28 RX ORDER — IBUPROFEN 800 MG/1
1 TABLET ORAL 3 TIMES DAILY
COMMUNITY
Start: 2023-01-06

## 2023-02-28 RX ORDER — SUMATRIPTAN 50 MG/1
TABLET, FILM COATED ORAL
Qty: 9 TABLET | Refills: 2 | Status: SHIPPED | OUTPATIENT
Start: 2023-02-28

## 2023-02-28 NOTE — PROGRESS NOTES
"Chief Complaint  Migraine    Subjective    History of Present Illness      Patient presents to [facility] for   History of Present Illness  Pt is here today for refill on Sumatriptan. Pt has a history of migraines and was previously prescribed this medication. No other problems or concerns at this time.       Review of Systems    I have reviewed and agree with the HPI information as above.  Verónica Leon, APRN     Objective   Vital Signs:   /85   Pulse 96   Ht 162.6 cm (64\")   Wt 57.6 kg (127 lb)   SpO2 100%   BMI 21.80 kg/m²     BMI is within normal parameters. No other follow-up for BMI required.      Physical Exam  Vitals and nursing note reviewed.   Constitutional:       Appearance: Normal appearance. She is well-developed.   HENT:      Head: Normocephalic and atraumatic.      Right Ear: Tympanic membrane, ear canal and external ear normal.      Left Ear: Tympanic membrane, ear canal and external ear normal.      Nose: Nose normal. No septal deviation, nasal tenderness or congestion.      Mouth/Throat:      Lips: Pink. No lesions.      Mouth: Mucous membranes are moist. No oral lesions.      Dentition: Normal dentition.      Pharynx: Oropharynx is clear. No pharyngeal swelling, oropharyngeal exudate or posterior oropharyngeal erythema.   Eyes:      General: Lids are normal. Vision grossly intact. No scleral icterus.        Right eye: No discharge.         Left eye: No discharge.      Extraocular Movements: Extraocular movements intact.      Conjunctiva/sclera: Conjunctivae normal.      Right eye: Right conjunctiva is not injected.      Left eye: Left conjunctiva is not injected.      Pupils: Pupils are equal, round, and reactive to light.   Neck:      Thyroid: No thyroid mass.      Trachea: Trachea normal.   Cardiovascular:      Rate and Rhythm: Normal rate and regular rhythm.      Heart sounds: Normal heart sounds. No murmur heard.    No gallop.   Pulmonary:      " Effort: Pulmonary effort is normal.      Breath sounds: Normal breath sounds and air entry. No wheezing, rhonchi or rales.   Musculoskeletal:         General: No tenderness or deformity. Normal range of motion.      Cervical back: Full passive range of motion without pain, normal range of motion and neck supple.      Thoracic back: Normal.      Right lower leg: No edema.      Left lower leg: No edema.   Skin:     General: Skin is warm and dry.      Coloration: Skin is not jaundiced.      Findings: No rash.   Neurological:      Mental Status: She is alert and oriented to person, place, and time.      Sensory: Sensation is intact.      Motor: Motor function is intact.      Coordination: Coordination is intact.      Gait: Gait is intact.      Deep Tendon Reflexes: Reflexes are normal and symmetric.   Psychiatric:         Mood and Affect: Mood and affect normal.         Behavior: Behavior normal.         Judgment: Judgment normal.          CLARENCE-7:      PHQ-2 Depression Screening  Little interest or pleasure in doing things? 0-->not at all   Feeling down, depressed, or hopeless? 0-->not at all   PHQ-2 Total Score 0     PHQ-9 Depression Screening  Little interest or pleasure in doing things? 0-->not at all   Feeling down, depressed, or hopeless? 0-->not at all   Trouble falling or staying asleep, or sleeping too much?     Feeling tired or having little energy?     Poor appetite or overeating?     Feeling bad about yourself - or that you are a failure or have let yourself or your family down?     Trouble concentrating on things, such as reading the newspaper or watching television?     Moving or speaking so slowly that other people could have noticed? Or the opposite - being so fidgety or restless that you have been moving around a lot more than usual?     Thoughts that you would be better off dead, or of hurting yourself in some way?     PHQ-9 Total Score 0   If you checked off any problems, how difficult have these  problems made it for you to do your work, take care of things at home, or get along with other people?        Result Review  Data Reviewed:                   Assessment and Plan      Diagnoses and all orders for this visit:    1. Other migraine without status migrainosus, not intractable  -     SUMAtriptan (Imitrex) 50 MG tablet; Take one tablet at onset of headache. May repeat dose one time in 2 hours if headache not relieved.  Dispense: 9 tablet; Refill: 2  -     ubrogepant (Ubrelvy) 100 MG tablet; Take 1 tablet by mouth 1 (One) Time for 1 dose.  Dispense: 3 tablet; Refill: 0      Patient states that she started getting a migraine yesterday and she is out of her medication. She wants the Imitrex refilled. I also discussed try ubrelvy. She is willing to try this. Samples provided. If she likes it she will let us know and I can send some into the pharmacy.       Follow Up   Return if symptoms worsen or fail to improve.  Patient was given instructions and counseling regarding her condition or for health maintenance advice. Please see specific information pulled into the AVS if appropriate.

## 2023-03-01 ENCOUNTER — TELEPHONE (OUTPATIENT)
Dept: FAMILY MEDICINE CLINIC | Facility: CLINIC | Age: 33
End: 2023-03-01
Payer: COMMERCIAL

## 2023-03-01 NOTE — TELEPHONE ENCOUNTER
Caller: Nasir Raquel A    Relationship: Self    Best call back number: [phone]    Requested Prescriptions:   UBRELVY (COULD NOT LOCATE ON MEDICATION LIST)    Pharmacy where request should be sent: [facility], [address] - [phone]  - [phone] FX     Additional details provided by patient: ONE SAMPLE LEFT    PATIENT STATES SHE RECEIVED A SAMPLE OF THIS MEDICATION, AND WOULD LIKE TO CONTINUE TAKING THIS MEDICATION FOR HER MIGRAINES.     Does the patient have less than a 3 day supply:  [x] Yes  [] No    Would you like a call back once the refill request has been completed: [x] Yes [] No    If the office needs to give you a call back, can they leave a voicemail: [x] Yes [] No    Osvaldo Torres Rep   03/01/23 08:25 CST

## 2023-07-27 ENCOUNTER — APPOINTMENT (OUTPATIENT)
Dept: CT IMAGING | Age: 33
End: 2023-07-27
Payer: COMMERCIAL

## 2023-07-27 ENCOUNTER — HOSPITAL ENCOUNTER (EMERGENCY)
Age: 33
Discharge: HOME | End: 2023-07-27
Payer: COMMERCIAL

## 2023-07-27 VITALS
SYSTOLIC BLOOD PRESSURE: 121 MMHG | HEIGHT: 65 IN | OXYGEN SATURATION: 98 % | TEMPERATURE: 96.6 F | WEIGHT: 124 LBS | RESPIRATION RATE: 17 BRPM | DIASTOLIC BLOOD PRESSURE: 94 MMHG | BODY MASS INDEX: 20.66 KG/M2 | HEART RATE: 116 BPM

## 2023-07-27 DIAGNOSIS — R10.9 LEFT FLANK PAIN: Primary | ICD-10-CM

## 2023-07-27 DIAGNOSIS — N12 PYELONEPHRITIS: ICD-10-CM

## 2023-07-27 LAB
ALBUMIN SERPL-MCNC: 4.7 G/DL (ref 3.5–5.2)
ALP SERPL-CCNC: 76 U/L (ref 35–104)
ALT SERPL-CCNC: <5 U/L (ref 5–33)
ANION GAP SERPL CALCULATED.3IONS-SCNC: 8 MMOL/L (ref 7–19)
AST SERPL-CCNC: 12 U/L (ref 5–32)
BACTERIA #/AREA URNS HPF: ABNORMAL /HPF
BASOPHILS # BLD: 0.1 K/UL (ref 0–0.2)
BASOPHILS NFR BLD: 0.8 % (ref 0–1)
BILIRUB SERPL-MCNC: 0.3 MG/DL (ref 0.2–1.2)
BILIRUB UR QL STRIP: NEGATIVE
BUN SERPL-MCNC: 11 MG/DL (ref 6–20)
CALCIUM SERPL-MCNC: 9.6 MG/DL (ref 8.6–10)
CHLORIDE SERPL-SCNC: 106 MMOL/L (ref 98–111)
CLARITY UR: ABNORMAL
CO2 SERPL-SCNC: 28 MMOL/L (ref 22–29)
COLOR UR: YELLOW
CREAT SERPL-MCNC: 0.9 MG/DL (ref 0.5–0.9)
CRYSTALS URNS MICRO: ABNORMAL /HPF
EOSINOPHIL # BLD: 0 K/UL (ref 0–0.6)
EOSINOPHIL NFR BLD: 0.1 % (ref 0–5)
ERYTHROCYTE [DISTWIDTH] IN BLOOD BY AUTOMATED COUNT: 12.4 % (ref 11.5–14.5)
GLUCOSE SERPL-MCNC: 78 MG/DL (ref 74–109)
GLUCOSE UR STRIP.AUTO-MCNC: NEGATIVE MG/DL
HCG SERPL QL: NEGATIVE
HCT VFR BLD AUTO: 43.9 % (ref 37–47)
HGB BLD-MCNC: 14.5 G/DL (ref 12–16)
HGB UR STRIP.AUTO-MCNC: ABNORMAL MG/L
IMM GRANULOCYTES # BLD: 0 K/UL
KETONES UR STRIP.AUTO-MCNC: NEGATIVE MG/DL
LEUKOCYTE ESTERASE UR QL STRIP.AUTO: ABNORMAL
LYMPHOCYTES # BLD: 2.6 K/UL (ref 1.1–4.5)
LYMPHOCYTES NFR BLD: 24.5 % (ref 20–40)
MCH RBC QN AUTO: 29.5 PG (ref 27–31)
MCHC RBC AUTO-ENTMCNC: 33 G/DL (ref 33–37)
MCV RBC AUTO: 89.2 FL (ref 81–99)
MONOCYTES # BLD: 0.7 K/UL (ref 0–0.9)
MONOCYTES NFR BLD: 6.8 % (ref 0–10)
NEUTROPHILS # BLD: 7 K/UL (ref 1.5–7.5)
NEUTS SEG NFR BLD: 67.4 % (ref 50–65)
NITRITE UR QL STRIP.AUTO: NEGATIVE
PH UR STRIP.AUTO: 5.5 [PH] (ref 5–8)
PLATELET # BLD AUTO: 227 K/UL (ref 130–400)
PMV BLD AUTO: 11.3 FL (ref 9.4–12.3)
POTASSIUM SERPL-SCNC: 4.6 MMOL/L (ref 3.5–5)
PROT SERPL-MCNC: 6.9 G/DL (ref 6.6–8.7)
PROT UR STRIP.AUTO-MCNC: 30 MG/DL
RBC # BLD AUTO: 4.92 M/UL (ref 4.2–5.4)
RBC #/AREA URNS HPF: ABNORMAL /HPF (ref 0–2)
SODIUM SERPL-SCNC: 142 MMOL/L (ref 136–145)
SP GR UR STRIP.AUTO: 1.03 (ref 1–1.03)
UROBILINOGEN UR STRIP.AUTO-MCNC: 0.2 E.U./DL
WBC # BLD AUTO: 10.4 K/UL (ref 4.8–10.8)
WBC #/AREA URNS HPF: ABNORMAL /HPF (ref 0–5)
YEAST #/AREA URNS HPF: PRESENT /HPF

## 2023-07-27 PROCEDURE — 85025 COMPLETE CBC W/AUTO DIFF WBC: CPT

## 2023-07-27 PROCEDURE — 96374 THER/PROPH/DIAG INJ IV PUSH: CPT

## 2023-07-27 PROCEDURE — 81001 URINALYSIS AUTO W/SCOPE: CPT

## 2023-07-27 PROCEDURE — 87086 URINE CULTURE/COLONY COUNT: CPT

## 2023-07-27 PROCEDURE — 6360000002 HC RX W HCPCS: Performed by: PHYSICIAN ASSISTANT

## 2023-07-27 PROCEDURE — 99284 EMERGENCY DEPT VISIT MOD MDM: CPT

## 2023-07-27 PROCEDURE — 80053 COMPREHEN METABOLIC PANEL: CPT

## 2023-07-27 PROCEDURE — 2580000003 HC RX 258: Performed by: PHYSICIAN ASSISTANT

## 2023-07-27 PROCEDURE — 96375 TX/PRO/DX INJ NEW DRUG ADDON: CPT

## 2023-07-27 PROCEDURE — 36415 COLL VENOUS BLD VENIPUNCTURE: CPT

## 2023-07-27 PROCEDURE — 84703 CHORIONIC GONADOTROPIN ASSAY: CPT

## 2023-07-27 PROCEDURE — 74150 CT ABDOMEN W/O CONTRAST: CPT

## 2023-07-27 RX ORDER — METOCLOPRAMIDE HYDROCHLORIDE 5 MG/ML
10 INJECTION INTRAMUSCULAR; INTRAVENOUS ONCE
Status: COMPLETED | OUTPATIENT
Start: 2023-07-27 | End: 2023-07-27

## 2023-07-27 RX ORDER — KETOROLAC TROMETHAMINE 30 MG/ML
30 INJECTION, SOLUTION INTRAMUSCULAR; INTRAVENOUS ONCE
Status: COMPLETED | OUTPATIENT
Start: 2023-07-27 | End: 2023-07-27

## 2023-07-27 RX ORDER — 0.9 % SODIUM CHLORIDE 0.9 %
500 INTRAVENOUS SOLUTION INTRAVENOUS ONCE
Status: COMPLETED | OUTPATIENT
Start: 2023-07-27 | End: 2023-07-27

## 2023-07-27 RX ORDER — KETOROLAC TROMETHAMINE 10 MG/1
10 TABLET, FILM COATED ORAL EVERY 6 HOURS PRN
Qty: 20 TABLET | Refills: 0 | Status: SHIPPED | OUTPATIENT
Start: 2023-07-27

## 2023-07-27 RX ORDER — METOCLOPRAMIDE 10 MG/1
10 TABLET ORAL 4 TIMES DAILY
Qty: 120 TABLET | Refills: 3 | Status: SHIPPED | OUTPATIENT
Start: 2023-07-27

## 2023-07-27 RX ORDER — CEPHALEXIN 500 MG/1
500 CAPSULE ORAL 2 TIMES DAILY
Qty: 14 CAPSULE | Refills: 0 | Status: SHIPPED | OUTPATIENT
Start: 2023-07-27 | End: 2023-08-03

## 2023-07-27 RX ADMIN — WATER 1000 MG: 1 INJECTION INTRAMUSCULAR; INTRAVENOUS; SUBCUTANEOUS at 12:08

## 2023-07-27 RX ADMIN — SODIUM CHLORIDE 500 ML: 9 INJECTION, SOLUTION INTRAVENOUS at 12:10

## 2023-07-27 RX ADMIN — METOCLOPRAMIDE HYDROCHLORIDE 10 MG: 5 INJECTION INTRAMUSCULAR; INTRAVENOUS at 12:08

## 2023-07-27 RX ADMIN — KETOROLAC TROMETHAMINE 30 MG: 30 INJECTION, SOLUTION INTRAMUSCULAR; INTRAVENOUS at 12:07

## 2023-07-27 ASSESSMENT — ENCOUNTER SYMPTOMS
PHOTOPHOBIA: 0
ABDOMINAL DISTENTION: 0
RHINORRHEA: 0
COUGH: 0
EYE PAIN: 0
NAUSEA: 0
COLOR CHANGE: 0
APNEA: 0
BACK PAIN: 0
ABDOMINAL PAIN: 0
SHORTNESS OF BREATH: 0
SORE THROAT: 0
EYE DISCHARGE: 0

## 2023-07-27 ASSESSMENT — PAIN DESCRIPTION - DESCRIPTORS: DESCRIPTORS: PATIENT UNABLE TO DESCRIBE

## 2023-07-27 ASSESSMENT — PAIN DESCRIPTION - ORIENTATION: ORIENTATION: LEFT

## 2023-07-27 ASSESSMENT — PAIN SCALES - GENERAL: PAINLEVEL_OUTOF10: 7

## 2023-07-27 ASSESSMENT — PAIN DESCRIPTION - LOCATION: LOCATION: FLANK

## 2023-07-27 ASSESSMENT — PAIN - FUNCTIONAL ASSESSMENT: PAIN_FUNCTIONAL_ASSESSMENT: 0-10

## 2023-07-27 NOTE — ED PROVIDER NOTES
[facility] EMERGENCY DEPT  eMERGENCYdEPARTMENT eNCOUnter      Pt Name: Luanne Reed  MRN: [id number]  [address] [facility] 1990  Date of evaluation: 7/27/2023  Provider:SRI Patterson    CHIEF COMPLAINT       Chief Complaint   Patient presents with    Flank Pain     Left flank and dysuria, low ab pain         HISTORY OF PRESENT ILLNESS  (Location/Symptom, Timing/Onset, Context/Setting, Quality, Duration, Modifying Factors, Severity.)   Luanne Reed is a 29 y.o. female who presents to the emergency department with complaints of left flank pain burning no prior hx of kidney stone does feel better here after voiding states feels like I need to go and have difficulty. She denies fever chills. HPI    Nursing Notes were reviewed and I agree. REVIEW OF SYSTEMS    (2-9 systems for level 4, 10 or more for level 5)     Review of Systems   Constitutional:  Negative for activity change, appetite change, chills and fever. HENT:  Negative for congestion, postnasal drip, rhinorrhea and sore throat. Eyes:  Negative for photophobia, pain, discharge and visual disturbance. Respiratory:  Negative for apnea, cough and shortness of breath. Cardiovascular:  Negative for chest pain and leg swelling. Gastrointestinal:  Negative for abdominal distention, abdominal pain and nausea. Genitourinary:  Positive for flank pain. Negative for vaginal bleeding. Musculoskeletal:  Negative for arthralgias, back pain, joint swelling, neck pain and neck stiffness. Skin:  Negative for color change and rash. Neurological:  Negative for dizziness, syncope, facial asymmetry and headaches. Hematological:  Negative for adenopathy. Does not bruise/bleed easily. Psychiatric/Behavioral:  Negative for agitation, behavioral problems and confusion. Except as noted above the remainder of the review of systems was reviewed and negative.        PAST MEDICAL HISTORY     Past Medical History:   Diagnosis Date    ADD (attention deficit disorder)

## 2023-07-29 LAB
BACTERIA UR CULT: ABNORMAL
BACTERIA UR CULT: ABNORMAL
ORGANISM: ABNORMAL

## 2023-08-31 ENCOUNTER — TELEPHONE (OUTPATIENT)
Dept: FAMILY MEDICINE CLINIC | Facility: CLINIC | Age: 33
End: 2023-08-31
Payer: COMMERCIAL

## 2023-08-31 DIAGNOSIS — J02.9 ACUTE PHARYNGITIS, UNSPECIFIED ETIOLOGY: Primary | ICD-10-CM

## 2023-08-31 RX ORDER — AZITHROMYCIN 250 MG/1
TABLET, FILM COATED ORAL
Qty: 6 TABLET | Refills: 0 | Status: SHIPPED | OUTPATIENT
Start: 2023-08-31

## 2023-08-31 NOTE — TELEPHONE ENCOUNTER
Routed to VON Layton, for recommendations. She ordered abx for the patient. Attempted to call pt to advise her of the above but she did not answer and her voicemail box was not set up. I sent the above info via [employer] message.

## 2023-08-31 NOTE — TELEPHONE ENCOUNTER
Caller: Raquel Best    Relationship: Self    Best call back number: [phone]    What medication are you requesting: MAYBE Z PACK    What are your current symptoms: SORE THROAT, FEVER EARLY THIS MORNING    How long have you been experiencing symptoms: SINCE YESTERDAY---SON WAS DIAGNOSED WITH STREP AT DR TREVIÑO'S OFFICE EARLIER TODAY    Have you had these symptoms before:    [x] Yes  [] No    Have you been treated for these symptoms before:   [x] Yes  [] No    If a prescription is needed, what is your preferred pharmacy and phone number: [facility], [facility] - [place], [place] - [address] [place] [address] - [phone]  - [phone] FX              Took patients money clip with 2 credit cards, ID and 10 dollars to ED where his friend (roommate) Kenneth Eid picked up and dropped off some clothes for patient.  RN Souleymane FERNANDEZ witnessed phone call conversation for trade of money clip for clothes.

## 2023-11-10 ENCOUNTER — TELEPHONE (OUTPATIENT)
Dept: FAMILY MEDICINE CLINIC | Facility: CLINIC | Age: 33
End: 2023-11-10
Payer: COMMERCIAL

## 2023-12-05 ENCOUNTER — OFFICE VISIT (OUTPATIENT)
Dept: FAMILY MEDICINE CLINIC | Facility: CLINIC | Age: 33
End: 2023-12-05
Payer: COMMERCIAL

## 2023-12-05 VITALS
HEART RATE: 96 BPM | TEMPERATURE: 98 F | WEIGHT: 122.6 LBS | SYSTOLIC BLOOD PRESSURE: 120 MMHG | RESPIRATION RATE: 18 BRPM | DIASTOLIC BLOOD PRESSURE: 88 MMHG | HEIGHT: 64 IN | BODY MASS INDEX: 20.93 KG/M2

## 2023-12-05 DIAGNOSIS — J02.8 ACUTE PHARYNGITIS DUE TO OTHER SPECIFIED ORGANISMS: Primary | ICD-10-CM

## 2023-12-05 PROCEDURE — 1159F MED LIST DOCD IN RCRD: CPT | Performed by: NURSE PRACTITIONER

## 2023-12-05 PROCEDURE — 1160F RVW MEDS BY RX/DR IN RCRD: CPT | Performed by: NURSE PRACTITIONER

## 2023-12-05 PROCEDURE — 99213 OFFICE O/P EST LOW 20 MIN: CPT | Performed by: NURSE PRACTITIONER

## 2023-12-05 RX ORDER — CEFDINIR 300 MG/1
300 CAPSULE ORAL 2 TIMES DAILY
Qty: 20 CAPSULE | Refills: 0 | Status: SHIPPED | OUTPATIENT
Start: 2023-12-05

## 2023-12-05 RX ORDER — CLONAZEPAM 0.5 MG/1
TABLET ORAL
COMMUNITY
Start: 2023-11-02

## 2023-12-05 RX ORDER — OXCARBAZEPINE 150 MG/1
TABLET, FILM COATED ORAL
COMMUNITY
Start: 2023-10-17

## 2023-12-05 RX ORDER — DEXTROAMPHETAMINE SACCHARATE, AMPHETAMINE ASPARTATE, DEXTROAMPHETAMINE SULFATE AND AMPHETAMINE SULFATE 2.5; 2.5; 2.5; 2.5 MG/1; MG/1; MG/1; MG/1
TABLET ORAL
COMMUNITY
Start: 2023-11-22

## 2023-12-05 RX ORDER — DEXAMETHASONE SODIUM PHOSPHATE 4 MG/ML
8 INJECTION, SOLUTION INTRA-ARTICULAR; INTRALESIONAL; INTRAMUSCULAR; INTRAVENOUS; SOFT TISSUE ONCE
Status: COMPLETED | OUTPATIENT
Start: 2023-12-05 | End: 2023-12-05

## 2023-12-05 RX ORDER — CEFTRIAXONE 1 G/1
1 INJECTION, POWDER, FOR SOLUTION INTRAMUSCULAR; INTRAVENOUS EVERY 24 HOURS
Status: COMPLETED | OUTPATIENT
Start: 2023-12-05 | End: 2023-12-05

## 2023-12-05 RX ADMIN — CEFTRIAXONE 1 G: 1 INJECTION, POWDER, FOR SOLUTION INTRAMUSCULAR; INTRAVENOUS at 14:23

## 2023-12-05 RX ADMIN — DEXAMETHASONE SODIUM PHOSPHATE 8 MG: 4 INJECTION, SOLUTION INTRA-ARTICULAR; INTRALESIONAL; INTRAMUSCULAR; INTRAVENOUS; SOFT TISSUE at 14:25

## 2023-12-05 NOTE — PROGRESS NOTES
After obtaining consent, and per orders of VON Dinh, injection of Rocephin 1 gram given by Monik Solano RN. Patient instructed to remain in clinic for 20 minutes afterwards, and to report any adverse reaction to me immediately. Pt tolerated well.

## 2023-12-05 NOTE — PROGRESS NOTES
After obtaining consent, and per orders of VON Griffin, injection of Decadron 8mg given by Monik Solano RN. Patient instructed to remain in clinic for 20 minutes afterwards, and to report any adverse reaction to me immediately. Pt tolerated well.

## 2023-12-05 NOTE — PROGRESS NOTES
"Chief Complaint  Sore Throat    Subjective    History of Present Illness      Patient presents to [facility] for   History of Present Illness  Pt c/o sore throat and earaches that started today. Denies fevers.       Review of Systems    I have reviewed and agree with the HPI information as above.  Verónica Leon, APRN     Objective   Vital Signs:   /88   Pulse 96   Temp 98 °F (36.7 °C)   Resp 18   Ht 162.6 cm (64\")   Wt 55.6 kg (122 lb 9.6 oz)   BMI 21.04 kg/m²     BMI is within normal parameters. No other follow-up for BMI required.      Physical Exam  Vitals and nursing note reviewed.   Constitutional:       Appearance: Normal appearance.   HENT:      Head: Normocephalic and atraumatic.      Right Ear: Tympanic membrane is erythematous and bulging.      Left Ear: Tympanic membrane is erythematous and bulging.      Nose: Congestion present.      Mouth/Throat:      Pharynx: Posterior oropharyngeal erythema present.   Cardiovascular:      Rate and Rhythm: Normal rate and regular rhythm.      Pulses: Normal pulses.      Heart sounds: Normal heart sounds.   Pulmonary:      Effort: Pulmonary effort is normal.   Musculoskeletal:         General: Normal range of motion.      Cervical back: Normal range of motion and neck supple.   Skin:     General: Skin is warm and dry.   Neurological:      General: No focal deficit present.      Mental Status: She is alert and oriented to person, place, and time.   Psychiatric:         Mood and Affect: Mood normal.         Behavior: Behavior normal.          CLARENCE-7:      PHQ-2 Depression Screening  Little interest or pleasure in doing things?     Feeling down, depressed, or hopeless?     PHQ-2 Total Score       PHQ-9 Depression Screening  Little interest or pleasure in doing things?     Feeling down, depressed, or hopeless?     Trouble falling or staying asleep, or sleeping too much?     Feeling tired or having little energy?     Poor " appetite or overeating?     Feeling bad about yourself - or that you are a failure or have let yourself or your family down?     Trouble concentrating on things, such as reading the newspaper or watching television?     Moving or speaking so slowly that other people could have noticed? Or the opposite - being so fidgety or restless that you have been moving around a lot more than usual?     Thoughts that you would be better off dead, or of hurting yourself in some way?     PHQ-9 Total Score     If you checked off any problems, how difficult have these problems made it for you to do your work, take care of things at home, or get along with other people?        Result Review  Data Reviewed:                   Assessment and Plan      Diagnoses and all orders for this visit:    1. Acute pharyngitis due to other specified organisms (Primary)  -     dexAMETHasone (DECADRON) injection 8 mg  -     cefTRIAXone (ROCEPHIN) injection 1 g  -     cefdinir (OMNICEF) 300 MG capsule; Take 1 capsule by mouth 2 (Two) Times a Day.  Dispense: 20 capsule; Refill: 0    Patient states that her throat is hurting really bad. Denies fever. Just does not feel well. Declined testing. Treat as above.         Follow Up   Return if symptoms worsen or fail to improve.  Patient was given instructions and counseling regarding her condition or for health maintenance advice. Please see specific information pulled into the AVS if appropriate.

## 2024-03-28 ENCOUNTER — TELEPHONE (OUTPATIENT)
Dept: FAMILY MEDICINE CLINIC | Facility: CLINIC | Age: 34
End: 2024-03-28
Payer: COMMERCIAL

## 2024-03-28 NOTE — TELEPHONE ENCOUNTER
Appt scheduled  
Caller: Raquel Best    Relationship: Self    Best call back number: [phone]    What medication are you requesting: ANTIBIOTICS    What are your current symptoms: ABSCESS IN GUMS FOLLOWING TEETH BEING PULLED    How long have you been experiencing symptoms: TODAY    If a prescription is needed, what is your preferred pharmacy and phone number: [facility] [place], [place] - [address] [facility] - [phone]  - [phone] FX     Additional notes: PATIENT STATES THAT DENTIST DID NOT PRESCRIBE HER ANTIBIOTICS FOLLOWING PROCEDURE. PATIENT EXPRESSING BAD DISCOMFORT, SENDING [facility].  
Statement Selected

## 2024-03-29 ENCOUNTER — OFFICE VISIT (OUTPATIENT)
Dept: FAMILY MEDICINE CLINIC | Facility: CLINIC | Age: 34
End: 2024-03-29

## 2024-03-29 VITALS
SYSTOLIC BLOOD PRESSURE: 114 MMHG | OXYGEN SATURATION: 100 % | DIASTOLIC BLOOD PRESSURE: 74 MMHG | TEMPERATURE: 98.4 F | WEIGHT: 123 LBS | RESPIRATION RATE: 20 BRPM | HEART RATE: 89 BPM | BODY MASS INDEX: 21 KG/M2 | HEIGHT: 64 IN

## 2024-03-29 DIAGNOSIS — M79.10 MUSCLE TENSION PAIN: ICD-10-CM

## 2024-03-29 DIAGNOSIS — L02.91 ABSCESS: Primary | ICD-10-CM

## 2024-03-29 DIAGNOSIS — M25.511 ACUTE PAIN OF RIGHT SHOULDER: ICD-10-CM

## 2024-03-29 PROCEDURE — 99213 OFFICE O/P EST LOW 20 MIN: CPT | Performed by: NURSE PRACTITIONER

## 2024-03-29 PROCEDURE — 96372 THER/PROPH/DIAG INJ SC/IM: CPT | Performed by: NURSE PRACTITIONER

## 2024-03-29 RX ORDER — DEXTROMETHORPHAN HYDROBROMIDE, BUPROPION HYDROCHLORIDE 105; 45 MG/1; MG/1
1 TABLET, MULTILAYER, EXTENDED RELEASE ORAL EVERY MORNING
COMMUNITY
Start: 2024-03-22

## 2024-03-29 RX ORDER — OXCARBAZEPINE 300 MG/1
1 TABLET, FILM COATED ORAL EVERY 12 HOURS SCHEDULED
COMMUNITY
Start: 2024-03-22 | End: 2024-03-29

## 2024-03-29 RX ORDER — TIZANIDINE 4 MG/1
4 TABLET ORAL NIGHTLY PRN
Qty: 20 TABLET | Refills: 0 | Status: SHIPPED | OUTPATIENT
Start: 2024-03-29

## 2024-03-29 RX ORDER — AMOXICILLIN AND CLAVULANATE POTASSIUM 875; 125 MG/1; MG/1
1 TABLET, FILM COATED ORAL 2 TIMES DAILY
Qty: 20 TABLET | Refills: 0 | Status: SHIPPED | OUTPATIENT
Start: 2024-03-29

## 2024-03-29 RX ORDER — CEFTRIAXONE 1 G/1
1 INJECTION, POWDER, FOR SOLUTION INTRAMUSCULAR; INTRAVENOUS ONCE
Status: COMPLETED | OUTPATIENT
Start: 2024-03-29 | End: 2024-03-29

## 2024-03-29 RX ORDER — DEXAMETHASONE SODIUM PHOSPHATE 4 MG/ML
8 INJECTION, SOLUTION INTRA-ARTICULAR; INTRALESIONAL; INTRAMUSCULAR; INTRAVENOUS; SOFT TISSUE ONCE
Status: COMPLETED | OUTPATIENT
Start: 2024-03-29 | End: 2024-03-29

## 2024-03-29 RX ORDER — LISDEXAMFETAMINE DIMESYLATE 70 MG/1
1 CAPSULE ORAL DAILY
COMMUNITY
Start: 2024-02-28

## 2024-03-29 RX ADMIN — CEFTRIAXONE 1 G: 1 INJECTION, POWDER, FOR SOLUTION INTRAMUSCULAR; INTRAVENOUS at 09:41

## 2024-03-29 RX ADMIN — DEXAMETHASONE SODIUM PHOSPHATE 8 MG: 4 INJECTION, SOLUTION INTRA-ARTICULAR; INTRALESIONAL; INTRAMUSCULAR; INTRAVENOUS; SOFT TISSUE at 09:43

## 2024-03-29 NOTE — PROGRESS NOTES
After obtaining consent, and per orders of VON Buenrostro, an injection of Decadron 8 mg was given by Isaias Morgan MA and administered to the right dorsoglutlea IM. Patient instructed to remain in clinic for 20 minutes afterwards, and to report any adverse reaction to me immediately. Pt tolerated well with no adverse reactions.

## 2024-03-29 NOTE — PROGRESS NOTES
After obtaining consent, and per orders of VON Buenrostro, an injection of Rocephin 1 g was given by Isaias Morgan MA and administered to the left dorsogluteal IM. Patient instructed to remain in clinic for 20 minutes afterwards, and to report any adverse reaction to me immediately. Pt tolerated well with no adverse reactions.

## 2024-03-29 NOTE — PROGRESS NOTES
"Chief Complaint  dental abscess    Subjective    History of Present Illness      Patient presents to [facility] for   History of Present Illness  Pt is here today c/o of a dental abscess on her upper right jaw.  Pt reports she had an abscess and went to [facility] to get it checked out and had two teeth removed.  Pt states she was not given any antibiotics.  Pt also said she has hurt her back when trying not to bend over because of her mouth.        Review of Systems   Constitutional: Negative.    HENT:  Positive for dental problem.    Eyes: Negative.    Respiratory: Negative.     Cardiovascular: Negative.    Gastrointestinal: Negative.    Endocrine: Negative.    Genitourinary: Negative.    Musculoskeletal:  Positive for back pain.   Skin: Negative.    Allergic/Immunologic: Negative.    Neurological: Negative.    Hematological: Negative.    Psychiatric/Behavioral: Negative.         I have reviewed and agree with the HPI and ROS information as above.  Verónica Flores, APRN     Objective   Vital Signs:   /74   Pulse 89   Temp 98.4 °F (36.9 °C)   Resp 20   Ht 162.6 cm (64\")   Wt 55.8 kg (123 lb)   SpO2 100%   BMI 21.11 kg/m²     BMI is within normal parameters. No other follow-up for BMI required.      Physical Exam  Constitutional:       Appearance: Normal appearance. She is well-developed.   HENT:      Head: Normocephalic and atraumatic.      Right Ear: External ear normal.      Left Ear: External ear normal.      Nose: Nose normal. No nasal tenderness or congestion.      Mouth/Throat:      Lips: Pink. No lesions.      Mouth: Mucous membranes are moist. No oral lesions.      Dentition: Normal dentition. Dental tenderness present.      Pharynx: Oropharynx is clear. No pharyngeal swelling, oropharyngeal exudate or posterior oropharyngeal erythema.   Eyes:      General: Lids are normal. Vision grossly intact. No scleral icterus.        Right eye: No discharge.         Left " eye: No discharge.      Extraocular Movements: Extraocular movements intact.      Conjunctiva/sclera: Conjunctivae normal.      Right eye: Right conjunctiva is not injected.      Left eye: Left conjunctiva is not injected.      Pupils: Pupils are equal, round, and reactive to light.   Cardiovascular:      Rate and Rhythm: Normal rate and regular rhythm.      Heart sounds: Normal heart sounds. No murmur heard.     No gallop.   Pulmonary:      Effort: Pulmonary effort is normal.      Breath sounds: Normal breath sounds and air entry. No wheezing, rhonchi or rales.   Musculoskeletal:         General: No tenderness or deformity. Normal range of motion.      Cervical back: Full passive range of motion without pain, normal range of motion and neck supple.      Right lower leg: No edema.      Left lower leg: No edema.   Skin:     General: Skin is warm and dry.      Coloration: Skin is not jaundiced.      Findings: No rash.   Neurological:      Mental Status: She is alert and oriented to person, place, and time.      Sensory: Sensation is intact.      Motor: Motor function is intact.      Coordination: Coordination is intact.      Gait: Gait is intact.   Psychiatric:         Attention and Perception: Attention normal.         Mood and Affect: Mood and affect normal.         Behavior: Behavior is not hyperactive. Behavior is cooperative.         Thought Content: Thought content normal.         Judgment: Judgment normal.          CLARENCE-7: Over the last two weeks, how often have you been bothered by the following problems?  Feeling nervous, anxious or on edge: Not at all  Not being able to stop or control worrying: Not at all  Worrying too much about different things: Not at all  Trouble Relaxing: Not at all  Being so restless that it is hard to sit still: Not at all  Becoming easily annoyed or irritable: Not at all  Feeling afraid as if something awful might happen: Not at all  CLARENCE 7 Total Score: 0  If you checked any problems,  how difficult have these problems made it for you to do your work, take care of things at home, or get along with other people: Not difficult at all    PHQ-2 Depression Screening  Little interest or pleasure in doing things? 0-->not at all   Feeling down, depressed, or hopeless? 0-->not at all   PHQ-2 Total Score 0     PHQ-9 Depression Screening  Little interest or pleasure in doing things? 0-->not at all   Feeling down, depressed, or hopeless? 0-->not at all   Trouble falling or staying asleep, or sleeping too much?     Feeling tired or having little energy?     Poor appetite or overeating?     Feeling bad about yourself - or that you are a failure or have let yourself or your family down?     Trouble concentrating on things, such as reading the newspaper or watching television?     Moving or speaking so slowly that other people could have noticed? Or the opposite - being so fidgety or restless that you have been moving around a lot more than usual?     Thoughts that you would be better off dead, or of hurting yourself in some way?     PHQ-9 Total Score 0   If you checked off any problems, how difficult have these problems made it for you to do your work, take care of things at home, or get along with other people?        Result Review  Data Reviewed:                Assessment and Plan      Diagnoses and all orders for this visit:    1. Abscess (Primary)  -     cefTRIAXone (ROCEPHIN) injection 1 g  -     dexAMETHasone (DECADRON) injection 8 mg  -     amoxicillin-clavulanate (AUGMENTIN) 875-125 MG per tablet; Take 1 tablet by mouth 2 (Two) Times a Day.  Dispense: 20 tablet; Refill: 0    2. Muscle tension pain  -     dexAMETHasone (DECADRON) injection 8 mg  -     tiZANidine (Zanaflex) 4 MG tablet; Take 1 tablet by mouth At Night As Needed for Muscle Spasms.  Dispense: 20 tablet; Refill: 0    3. Acute pain of right shoulder  -     dexAMETHasone (DECADRON) injection 8 mg  -     tiZANidine (Zanaflex) 4 MG tablet; Take 1  tablet by mouth At Night As Needed for Muscle Spasms.  Dispense: 20 tablet; Refill: 0      Patient here today with complaints of right side facial pain.  She states she had a dental abscess and had a tooth removed on the upper right side on Wednesday.  She states she was not given any antibiotics for this.  On exam the gum is discolored where the tooth was removed and is black in color.  She states she has pain to the right side of her face as well as down her neck.  She also states she has pain to her right shoulder from holding her head a particular way.  She reports pain to her right shoulder blade up to her neck.    Plan:    1.  Rocephin and Decadron injections given in office.  2.  Start Augmentin twice daily x 10 days.  Instructed to start this tomorrow.  3.  Encouraged to continue using ibuprofen as needed.  4.  Will also send muscle relaxer to use at bedtime as needed.  Encouraged to apply heat to her shoulder to help with muscle pain.  5.  Follow-up if symptoms do not improve or become worse.  Also encouraged to set up appointment with a dentist if pain continues.        Follow Up   Return if symptoms worsen or fail to improve.  Patient was given instructions and counseling regarding her condition or for health maintenance advice. Please see specific information pulled into the AVS if appropriate.

## 2024-03-30 ENCOUNTER — APPOINTMENT (OUTPATIENT)
Dept: CT IMAGING | Age: 34
End: 2024-03-30
Payer: COMMERCIAL

## 2024-03-30 ENCOUNTER — HOSPITAL ENCOUNTER (EMERGENCY)
Age: 34
Discharge: HOME | End: 2024-03-30
Attending: EMERGENCY MEDICINE
Payer: COMMERCIAL

## 2024-03-30 VITALS
SYSTOLIC BLOOD PRESSURE: 108 MMHG | TEMPERATURE: 98.4 F | DIASTOLIC BLOOD PRESSURE: 72 MMHG | BODY MASS INDEX: 20.63 KG/M2 | RESPIRATION RATE: 18 BRPM | HEART RATE: 88 BPM | WEIGHT: 124 LBS | OXYGEN SATURATION: 99 %

## 2024-03-30 DIAGNOSIS — K08.89 PAIN, DENTAL: Primary | ICD-10-CM

## 2024-03-30 DIAGNOSIS — K08.409 S/P TOOTH EXTRACTION: ICD-10-CM

## 2024-03-30 LAB
ALBUMIN SERPL-MCNC: 4.4 G/DL (ref 3.5–5.2)
ALP SERPL-CCNC: 56 U/L (ref 35–104)
ALT SERPL-CCNC: 7 U/L (ref 5–33)
ANION GAP SERPL CALCULATED.3IONS-SCNC: 11 MMOL/L (ref 7–19)
AST SERPL-CCNC: 15 U/L (ref 5–32)
BASOPHILS # BLD: 0.1 K/UL (ref 0–0.2)
BASOPHILS NFR BLD: 0.6 % (ref 0–1)
BILIRUB SERPL-MCNC: <0.2 MG/DL (ref 0.2–1.2)
BUN SERPL-MCNC: 11 MG/DL (ref 6–20)
CALCIUM SERPL-MCNC: 9.1 MG/DL (ref 8.6–10)
CHLORIDE SERPL-SCNC: 104 MMOL/L (ref 98–111)
CO2 SERPL-SCNC: 26 MMOL/L (ref 22–29)
CREAT SERPL-MCNC: 0.6 MG/DL (ref 0.5–0.9)
EOSINOPHIL # BLD: 0 K/UL (ref 0–0.6)
EOSINOPHIL NFR BLD: 0.4 % (ref 0–5)
ERYTHROCYTE [DISTWIDTH] IN BLOOD BY AUTOMATED COUNT: 12.1 % (ref 11.5–14.5)
GLUCOSE SERPL-MCNC: 83 MG/DL (ref 74–109)
HCG SERPL QL: NEGATIVE
HCT VFR BLD AUTO: 39.3 % (ref 37–47)
HGB BLD-MCNC: 13 G/DL (ref 12–16)
IMM GRANULOCYTES # BLD: 0 K/UL
LYMPHOCYTES # BLD: 3.6 K/UL (ref 1.1–4.5)
LYMPHOCYTES NFR BLD: 45.1 % (ref 20–40)
MCH RBC QN AUTO: 29.8 PG (ref 27–31)
MCHC RBC AUTO-ENTMCNC: 33.1 G/DL (ref 33–37)
MCV RBC AUTO: 90.1 FL (ref 81–99)
MONOCYTES # BLD: 0.7 K/UL (ref 0–0.9)
MONOCYTES NFR BLD: 8.7 % (ref 0–10)
NEUTROPHILS # BLD: 3.6 K/UL (ref 1.5–7.5)
NEUTS SEG NFR BLD: 44.9 % (ref 50–65)
PLATELET # BLD AUTO: 193 K/UL (ref 130–400)
PMV BLD AUTO: 11 FL (ref 9.4–12.3)
POTASSIUM SERPL-SCNC: 3.7 MMOL/L (ref 3.5–5)
PROT SERPL-MCNC: 6.7 G/DL (ref 6.6–8.7)
RBC # BLD AUTO: 4.36 M/UL (ref 4.2–5.4)
SODIUM SERPL-SCNC: 141 MMOL/L (ref 136–145)
WBC # BLD AUTO: 7.9 K/UL (ref 4.8–10.8)

## 2024-03-30 PROCEDURE — 36415 COLL VENOUS BLD VENIPUNCTURE: CPT

## 2024-03-30 PROCEDURE — 84703 CHORIONIC GONADOTROPIN ASSAY: CPT

## 2024-03-30 PROCEDURE — 96374 THER/PROPH/DIAG INJ IV PUSH: CPT

## 2024-03-30 PROCEDURE — 96375 TX/PRO/DX INJ NEW DRUG ADDON: CPT

## 2024-03-30 PROCEDURE — 80053 COMPREHEN METABOLIC PANEL: CPT

## 2024-03-30 PROCEDURE — 99285 EMERGENCY DEPT VISIT HI MDM: CPT

## 2024-03-30 PROCEDURE — 85025 COMPLETE CBC W/AUTO DIFF WBC: CPT

## 2024-03-30 PROCEDURE — 70487 CT MAXILLOFACIAL W/DYE: CPT

## 2024-03-30 PROCEDURE — 6360000004 HC RX CONTRAST MEDICATION: Performed by: PHYSICIAN ASSISTANT

## 2024-03-30 PROCEDURE — 6370000000 HC RX 637 (ALT 250 FOR IP): Performed by: PHYSICIAN ASSISTANT

## 2024-03-30 PROCEDURE — 6360000002 HC RX W HCPCS: Performed by: PHYSICIAN ASSISTANT

## 2024-03-30 RX ORDER — MORPHINE SULFATE 4 MG/ML
4 INJECTION, SOLUTION INTRAMUSCULAR; INTRAVENOUS ONCE
Status: DISCONTINUED | OUTPATIENT
Start: 2024-03-30 | End: 2024-03-30

## 2024-03-30 RX ORDER — OXYCODONE HYDROCHLORIDE AND ACETAMINOPHEN 5; 325 MG/1; MG/1
1 TABLET ORAL ONCE
Status: COMPLETED | OUTPATIENT
Start: 2024-03-30 | End: 2024-03-30

## 2024-03-30 RX ORDER — ONDANSETRON 4 MG/1
4 TABLET, ORALLY DISINTEGRATING ORAL 3 TIMES DAILY PRN
Qty: 21 TABLET | Refills: 0 | Status: SHIPPED | OUTPATIENT
Start: 2024-03-30

## 2024-03-30 RX ORDER — MORPHINE SULFATE 4 MG/ML
2 INJECTION, SOLUTION INTRAMUSCULAR; INTRAVENOUS ONCE
Status: COMPLETED | OUTPATIENT
Start: 2024-03-30 | End: 2024-03-30

## 2024-03-30 RX ORDER — ONDANSETRON 2 MG/ML
4 INJECTION INTRAMUSCULAR; INTRAVENOUS ONCE
Status: COMPLETED | OUTPATIENT
Start: 2024-03-30 | End: 2024-03-30

## 2024-03-30 RX ORDER — OXYCODONE HYDROCHLORIDE AND ACETAMINOPHEN 5; 325 MG/1; MG/1
1 TABLET ORAL EVERY 8 HOURS PRN
Qty: 6 TABLET | Refills: 0 | Status: SHIPPED | OUTPATIENT
Start: 2024-03-30 | End: 2024-04-02

## 2024-03-30 RX ADMIN — MORPHINE SULFATE 2 MG: 4 INJECTION, SOLUTION INTRAMUSCULAR; INTRAVENOUS at 20:24

## 2024-03-30 RX ADMIN — ONDANSETRON 4 MG: 2 INJECTION INTRAMUSCULAR; INTRAVENOUS at 20:23

## 2024-03-30 RX ADMIN — IOPAMIDOL 90 ML: 755 INJECTION, SOLUTION INTRAVENOUS at 20:49

## 2024-03-30 RX ADMIN — OXYCODONE AND ACETAMINOPHEN 1 TABLET: 5; 325 TABLET ORAL at 21:55

## 2024-03-30 ASSESSMENT — ENCOUNTER SYMPTOMS
FACIAL SWELLING: 1
SHORTNESS OF BREATH: 0
VOMITING: 0
NAUSEA: 0
ABDOMINAL PAIN: 0
TROUBLE SWALLOWING: 0

## 2024-03-30 ASSESSMENT — PAIN DESCRIPTION - ORIENTATION: ORIENTATION: RIGHT;UPPER

## 2024-03-30 ASSESSMENT — PAIN DESCRIPTION - LOCATION: LOCATION: MOUTH

## 2024-03-30 ASSESSMENT — PAIN SCALES - GENERAL: PAINLEVEL_OUTOF10: 9

## 2024-03-31 NOTE — ED PROVIDER NOTES
[facility] EMERGENCY DEPT  EMERGENCY DEPARTMENT ENCOUNTER      Pt Name: Laura De Paz  MRN: [id number]  Birthdate 1990  Date of evaluation: 3/30/2024  Provider: Jaime Perez Jr, MD    CHIEF COMPLAINT       Chief Complaint   Patient presents with    Dental Pain     Tooth extraction on Wednesday; still in pain          PHYSICAL EXAM    (up to 7 for level 4, 8 or more for level 5)     ED Triage Vitals   BP Temp Temp Source Pulse Respirations SpO2 Height Weight - Scale   03/30/24 1919 03/30/24 1919 03/30/24 2145 03/30/24 1919 03/30/24 1919 03/30/24 1919 -- 03/30/24 1919   (!) 122/97 98.2 °F (36.8 °C) Oral (!) 105 18 99 %  56.2 kg (124 lb)       Physical Exam    DIAGNOSTIC RESULTS     EKG: All EKG's are interpreted by the Emergency Department Physician who either signs or Co-signs this chart in the absence of a cardiologist.        RADIOLOGY:   Non-plain film images such as CT, Ultrasound and MRI are read by the radiologist. Plain radiographicimages are visualized and preliminarily interpreted by the emergency physician with the below findings:        CT FACIAL BONES W CONTRAST   Final Result       Dental disease.  Recently extracted right maxillary lateral incisor and the canine.       No soft tissue abscess.        All CT scans are performed using dose optimization techniques as appropriate to the performed exam and include    at least one of the following: Automated exposure control, adjustment of the mA and/or kV according to size, and the use of iterative reconstruction technique.        ______________________________________    Electronically signed by: ARNALDO DANIEL M.D.   Date:     03/30/2024   Time:    21:17               LABS:  Labs Reviewed   CBC WITH AUTO DIFFERENTIAL - Abnormal; Notable for the following components:       Result Value    Neutrophils % 44.9 (*)     Lymphocytes % 45.1 (*)     All other components within normal limits   COMPREHENSIVE METABOLIC PANEL   HCG, SERUM, QUALITATIVE       All other labs were

## 2024-03-31 NOTE — ED PROVIDER NOTES
[facility] EMERGENCY DEPT  eMERGENCY dEPARTMENT eNCOUnter      Pt Name: Laura De Paz  MRN: [id number]  Birthdate 1990  Date of evaluation: 3/30/2024  Provider: SRI Lawson    CHIEF COMPLAINT       Chief Complaint   Patient presents with    Dental Pain     Tooth extraction on Wednesday; still in pain          HISTORY OF PRESENT ILLNESS   (Location/Symptom, Timing/Onset,Context/Setting, Quality, Duration, Modifying Factors, Severity)  Note limiting factors.   Laura De Paz is a 33 y.o. female history including ADD, ADHD, factor V Leiden, MTHFR, anemia, and chronic back pain who presents to the emergency department with dental pain. The patient was seen on Wednesday at [facility]. At that time, she was found to have a severely abscessed front tooth. She explains the infection was up into her sinuses. She states the tooth was extracted. She states she was not given antibiotic before or after tooth extraction. She states her numbing was worn off before the procedure was completed and she has had severe pain in the jaw line up into the sinuses since the procedure was completed. She states that the tooth broke during extraction and so they had to \"drill the rest of the tooth out.\" She states she went to see her PCP at Dr Steven's office yesterday and she was given a shot of rocephin, shot of steroid, and started on an oral antibiotic. She states that her pain and symptoms have not improved. She now he pain up into the sinuses and wants further evaluation.     NursingNotes were reviewed.    REVIEW OF SYSTEMS    (2-9 systems for level 4, 10 or more for level 5)     Review of Systems   Constitutional:  Negative for chills and fever.   HENT:  Positive for dental problem and facial swelling. Negative for trouble swallowing.    Respiratory:  Negative for shortness of breath.    Cardiovascular:  Negative for chest pain.   Gastrointestinal:  Negative for abdominal pain, nausea and vomiting.   Musculoskeletal:  Negative for neck

## 2024-08-27 ENCOUNTER — OFFICE VISIT (OUTPATIENT)
Dept: FAMILY MEDICINE CLINIC | Facility: CLINIC | Age: 34
End: 2024-08-27
Payer: COMMERCIAL

## 2024-08-27 ENCOUNTER — LAB (OUTPATIENT)
Dept: LAB | Facility: HOSPITAL | Age: 34
End: 2024-08-27
Payer: COMMERCIAL

## 2024-08-27 VITALS
DIASTOLIC BLOOD PRESSURE: 77 MMHG | HEIGHT: 64 IN | SYSTOLIC BLOOD PRESSURE: 119 MMHG | OXYGEN SATURATION: 98 % | HEART RATE: 97 BPM | WEIGHT: 121 LBS | RESPIRATION RATE: 20 BRPM | TEMPERATURE: 98.1 F | BODY MASS INDEX: 20.66 KG/M2

## 2024-08-27 DIAGNOSIS — M25.511 ACUTE PAIN OF RIGHT SHOULDER: ICD-10-CM

## 2024-08-27 DIAGNOSIS — Z11.59 ENCOUNTER FOR HEPATITIS C SCREENING TEST FOR LOW RISK PATIENT: ICD-10-CM

## 2024-08-27 DIAGNOSIS — F90.0 ATTENTION DEFICIT HYPERACTIVITY DISORDER (ADHD), PREDOMINANTLY INATTENTIVE TYPE: ICD-10-CM

## 2024-08-27 DIAGNOSIS — Z00.00 WELLNESS EXAMINATION: Primary | ICD-10-CM

## 2024-08-27 DIAGNOSIS — M79.10 MUSCLE TENSION PAIN: ICD-10-CM

## 2024-08-27 DIAGNOSIS — Z00.00 WELLNESS EXAMINATION: ICD-10-CM

## 2024-08-27 DIAGNOSIS — F41.8 MIXED ANXIETY AND DEPRESSIVE DISORDER: ICD-10-CM

## 2024-08-27 DIAGNOSIS — F17.200 SMOKER: ICD-10-CM

## 2024-08-27 DIAGNOSIS — F39 MOOD DISORDER: ICD-10-CM

## 2024-08-27 LAB
25(OH)D3 SERPL-MCNC: 39.7 NG/ML (ref 30–100)
ALBUMIN SERPL-MCNC: 4.8 G/DL (ref 3.5–5)
ALBUMIN/GLOB SERPL: 1.7 G/DL (ref 1.1–2.5)
ALP SERPL-CCNC: 57 U/L (ref 24–120)
ALT SERPL W P-5'-P-CCNC: 11 U/L (ref 0–35)
ANION GAP SERPL CALCULATED.3IONS-SCNC: 11 MMOL/L (ref 4–13)
AST SERPL-CCNC: 21 U/L (ref 7–45)
AUTO MIXED CELLS #: 1 10*3/MM3 (ref 0.1–2.6)
AUTO MIXED CELLS %: 14.9 % (ref 0.1–24)
BACTERIA UR QL AUTO: ABNORMAL /HPF
BILIRUB SERPL-MCNC: 0.2 MG/DL (ref 0.1–1)
BILIRUB UR QL STRIP: NEGATIVE
BUN SERPL-MCNC: 11 MG/DL (ref 5–21)
BUN/CREAT SERPL: 12.5
CALCIUM SPEC-SCNC: 9.7 MG/DL (ref 8.6–10.5)
CHLORIDE SERPL-SCNC: 105 MMOL/L (ref 98–110)
CHOLEST SERPL-MCNC: 185 MG/DL (ref 130–200)
CLARITY UR: CLEAR
CO2 SERPL-SCNC: 24 MMOL/L (ref 24–31)
COD CRY URNS QL: ABNORMAL /HPF
COLOR UR: YELLOW
CREAT SERPL-MCNC: 0.88 MG/DL (ref 0.5–1.4)
EGFRCR SERPLBLD CKD-EPI 2021: 89.1 ML/MIN/1.73
ERYTHROCYTE [DISTWIDTH] IN BLOOD BY AUTOMATED COUNT: 12.5 % (ref 12.3–15.4)
GLOBULIN UR ELPH-MCNC: 2.8 GM/DL
GLUCOSE SERPL-MCNC: 79 MG/DL (ref 70–100)
GLUCOSE UR STRIP-MCNC: NEGATIVE MG/DL
HBA1C MFR BLD: 5.3 % (ref 4.8–5.9)
HCT VFR BLD AUTO: 43.9 % (ref 34–46.6)
HCV AB SER QL: NORMAL
HDLC SERPL-MCNC: 59 MG/DL
HGB BLD-MCNC: 14.3 G/DL (ref 12–15.9)
HGB UR QL STRIP.AUTO: ABNORMAL
HYALINE CASTS UR QL AUTO: ABNORMAL /LPF
KETONES UR QL STRIP: ABNORMAL
LDLC SERPL CALC-MCNC: 102 MG/DL (ref 0–99)
LDLC/HDLC SERPL: 1.67 {RATIO}
LEUKOCYTE ESTERASE UR QL STRIP.AUTO: NEGATIVE
LYMPHOCYTES # BLD AUTO: 2.6 10*3/MM3 (ref 0.7–3.1)
LYMPHOCYTES NFR BLD AUTO: 40.5 % (ref 19.6–45.3)
MCH RBC QN AUTO: 29.5 PG (ref 26.6–33)
MCHC RBC AUTO-ENTMCNC: 32.6 G/DL (ref 31.5–35.7)
MCV RBC AUTO: 90.5 FL (ref 79–97)
MUCOUS THREADS URNS QL MICRO: ABNORMAL /HPF
NEUTROPHILS NFR BLD AUTO: 2.8 10*3/MM3 (ref 1.7–7)
NEUTROPHILS NFR BLD AUTO: 44.6 % (ref 42.7–76)
NITRITE UR QL STRIP: NEGATIVE
PH UR STRIP.AUTO: 6 [PH] (ref 5–8)
PLATELET # BLD AUTO: 197 10*3/MM3 (ref 140–450)
PMV BLD AUTO: 10.9 FL (ref 6–12)
POTASSIUM SERPL-SCNC: 3.8 MMOL/L (ref 3.5–5.3)
PROT SERPL-MCNC: 7.6 G/DL (ref 6.3–8.7)
PROT UR QL STRIP: NEGATIVE
RBC # BLD AUTO: 4.85 10*6/MM3 (ref 3.77–5.28)
RBC # UR STRIP: ABNORMAL /HPF
REF LAB TEST METHOD: ABNORMAL
SODIUM SERPL-SCNC: 140 MMOL/L (ref 135–145)
SP GR UR STRIP: >=1.03 (ref 1–1.03)
SQUAMOUS #/AREA URNS HPF: ABNORMAL /HPF
TRIGL SERPL-MCNC: 136 MG/DL (ref 0–149)
TSH SERPL DL<=0.05 MIU/L-ACNC: 0.56 UIU/ML (ref 0.27–4.2)
UROBILINOGEN UR QL STRIP: ABNORMAL
VIT B12 BLD-MCNC: 335 PG/ML (ref 211–946)
VLDLC SERPL-MCNC: 24 MG/DL (ref 5–40)
WBC # UR STRIP: ABNORMAL /HPF
WBC NRBC COR # BLD AUTO: 6.4 10*3/MM3 (ref 3.4–10.8)

## 2024-08-27 PROCEDURE — 36415 COLL VENOUS BLD VENIPUNCTURE: CPT

## 2024-08-27 PROCEDURE — 1159F MED LIST DOCD IN RCRD: CPT | Performed by: NURSE PRACTITIONER

## 2024-08-27 PROCEDURE — 1160F RVW MEDS BY RX/DR IN RCRD: CPT | Performed by: NURSE PRACTITIONER

## 2024-08-27 PROCEDURE — 80053 COMPREHEN METABOLIC PANEL: CPT

## 2024-08-27 PROCEDURE — 81001 URINALYSIS AUTO W/SCOPE: CPT

## 2024-08-27 PROCEDURE — 85025 COMPLETE CBC W/AUTO DIFF WBC: CPT

## 2024-08-27 PROCEDURE — 84443 ASSAY THYROID STIM HORMONE: CPT

## 2024-08-27 PROCEDURE — 80061 LIPID PANEL: CPT

## 2024-08-27 PROCEDURE — 82306 VITAMIN D 25 HYDROXY: CPT

## 2024-08-27 PROCEDURE — 82607 VITAMIN B-12: CPT

## 2024-08-27 PROCEDURE — 83036 HEMOGLOBIN GLYCOSYLATED A1C: CPT

## 2024-08-27 PROCEDURE — 2014F MENTAL STATUS ASSESS: CPT | Performed by: NURSE PRACTITIONER

## 2024-08-27 PROCEDURE — 86803 HEPATITIS C AB TEST: CPT

## 2024-08-27 PROCEDURE — 99395 PREV VISIT EST AGE 18-39: CPT | Performed by: NURSE PRACTITIONER

## 2024-08-27 RX ORDER — CLONAZEPAM 1 MG/1
1 TABLET ORAL AS NEEDED
COMMUNITY

## 2024-08-27 RX ORDER — OXCARBAZEPINE 150 MG/1
150 TABLET, FILM COATED ORAL
COMMUNITY
Start: 2024-08-12

## 2024-08-27 NOTE — PROGRESS NOTES
"Chief Complaint  Annual Exam    Subjective    History of Present Illness      Patient presents to [facility] for   History of Present Illness  Pt is here today for her annual physical.         Review of Systems   Constitutional: Negative.    HENT: Negative.     Eyes: Negative.    Respiratory: Negative.     Cardiovascular: Negative.    Gastrointestinal: Negative.    Endocrine: Negative.    Genitourinary: Negative.    Musculoskeletal: Negative.    Skin: Negative.    Allergic/Immunologic: Negative.    Neurological: Negative.    Hematological: Negative.    Psychiatric/Behavioral: Negative.         I have reviewed and agree with the HPI and ROS information as above.  Verónica Flores, APRN     Objective   Vital Signs:   /77   Pulse 97   Temp 98.1 °F (36.7 °C)   Resp 20   Ht 162.6 cm (64\")   Wt 54.9 kg (121 lb)   SpO2 98%   BMI 20.77 kg/m²     BMI is within normal parameters. No other follow-up for BMI required.      Physical Exam  Constitutional:       Appearance: Normal appearance. She is well-developed.   HENT:      Head: Normocephalic and atraumatic.      Right Ear: External ear normal.      Left Ear: External ear normal.      Nose: Nose normal. No nasal tenderness or congestion.      Mouth/Throat:      Lips: Pink. No lesions.      Mouth: Mucous membranes are moist. No oral lesions.      Dentition: Dental caries present.      Pharynx: Oropharynx is clear. No pharyngeal swelling, oropharyngeal exudate or posterior oropharyngeal erythema.   Eyes:      General: Lids are normal. Vision grossly intact. No scleral icterus.        Right eye: No discharge.         Left eye: No discharge.      Extraocular Movements: Extraocular movements intact.      Conjunctiva/sclera: Conjunctivae normal.      Right eye: Right conjunctiva is not injected.      Left eye: Left conjunctiva is not injected.      Pupils: Pupils are equal, round, and reactive to light.   Cardiovascular:      Rate and " Rhythm: Normal rate and regular rhythm.      Heart sounds: Normal heart sounds. No murmur heard.     No gallop.   Pulmonary:      Effort: Pulmonary effort is normal.      Breath sounds: Normal breath sounds and air entry. No wheezing, rhonchi or rales.   Musculoskeletal:         General: No tenderness or deformity. Normal range of motion.      Cervical back: Full passive range of motion without pain, normal range of motion and neck supple.      Right lower leg: No edema.      Left lower leg: No edema.   Skin:     General: Skin is warm and dry.      Coloration: Skin is not jaundiced.      Findings: No rash.   Neurological:      Mental Status: She is alert and oriented to person, place, and time.      Sensory: Sensation is intact.      Motor: Motor function is intact.      Coordination: Coordination is intact.      Gait: Gait is intact.   Psychiatric:         Attention and Perception: Attention normal.         Mood and Affect: Mood and affect normal.         Behavior: Behavior is not hyperactive. Behavior is cooperative.         Thought Content: Thought content normal.         Judgment: Judgment normal.          CLARENCE-7: Over the last two weeks, how often have you been bothered by the following problems?  Feeling nervous, anxious or on edge: Not at all  Not being able to stop or control worrying: Not at all  Worrying too much about different things: Not at all  Trouble Relaxing: Not at all  Being so restless that it is hard to sit still: Not at all  Becoming easily annoyed or irritable: Not at all  Feeling afraid as if something awful might happen: Not at all  CLARENCE 7 Total Score: 0  If you checked any problems, how difficult have these problems made it for you to do your work, take care of things at home, or get along with other people: Not difficult at all    PHQ-2 Depression Screening  Little interest or pleasure in doing things? 0-->not at all   Feeling down, depressed, or hopeless? 0-->not at all   PHQ-2 Total Score  0     PHQ-9 Depression Screening  Little interest or pleasure in doing things? 0-->not at all   Feeling down, depressed, or hopeless? 0-->not at all   Trouble falling or staying asleep, or sleeping too much?     Feeling tired or having little energy?     Poor appetite or overeating?     Feeling bad about yourself - or that you are a failure or have let yourself or your family down?     Trouble concentrating on things, such as reading the newspaper or watching television?     Moving or speaking so slowly that other people could have noticed? Or the opposite - being so fidgety or restless that you have been moving around a lot more than usual?     Thoughts that you would be better off dead, or of hurting yourself in some way?     PHQ-9 Total Score 0   If you checked off any problems, how difficult have these problems made it for you to do your work, take care of things at home, or get along with other people?        Result Review  Data Reviewed:              Assessment and Plan      Diagnoses and all orders for this visit:    1. Wellness examination (Primary)  -     Hemoglobin A1c; Future  -     CBC Auto Differential; Future  -     Comprehensive Metabolic Panel; Future  -     Lipid Panel; Future  -     TSH; Future  -     Urinalysis With Culture If Indicated - Urine, Clean Catch; Future  -     Vitamin D,25-Hydroxy; Future  -     Vitamin B12; Future    2. Encounter for hepatitis C screening test for low risk patient  -     Hepatitis C antibody; Future    3. Smoker    4. Attention deficit hyperactivity disorder (ADHD), predominantly inattentive type    5. Mixed anxiety and depressive disorder    6. Mood disorder      Patient here today for yearly wellness exam.  She states she is due for routine labs today.  She would also like vitamin D and B12 labs to be ordered as well.  She is up-to-date with her tetanus vaccine, next due in 2026.  She has not had any COVID vaccines.  She will also be due for the flu vaccine.  She is  up-to-date with her GYN exam and Pap smear, next due in 2025.  She is due for hep C screening.  She is due for an eye exam and dental exam.  Patient is a smoker, but states she has cut down on how much she is smoking currently.  BMI within normal range.    Plan:    1.  Routine labs ordered for patient to complete including vitamin D, B12, and hep C screening.  2.  Offered nicotine patches to help with smoking cessation, patient declines at this time.  She plans to stop smoking on her own.  To quit smoking handout added to after visit summary.  3.  All psych medications are managed by Dr. Silvestre at [facility].  4.  Encourage patient to get eye exam and dental exam up-to-date.  5.  Health maintenance and preventative care handout added to after visit summary.  6.  Further recommendations when lab results are available.  Follow-up as needed.        Follow Up   No follow-ups on file.  Patient was given instructions and counseling regarding her condition or for health maintenance advice. Please see specific information pulled into the AVS if appropriate.

## 2024-08-27 NOTE — TELEPHONE ENCOUNTER
Caller: Raquel Best    Relationship: Self    Best call back number: [phone]     Requested Prescriptions:   Requested Prescriptions     Pending Prescriptions Disp Refills    tiZANidine (Zanaflex) 4 MG tablet 20 tablet 0     Sig: Take 1 tablet by mouth At Night As Needed for Muscle Spasms.        Pharmacy where request should be sent: [facility], [address] - [phone]  - [phone] FX     Last office visit with prescribing clinician: Visit date not found   Last telemedicine visit with prescribing clinician: Visit date not found   Next office visit with prescribing clinician: Visit date not found     Additional details provided by patient:     Does the patient have less than a 3 day supply:  [x] Yes  [] No    Would you like a call back once the refill request has been completed: [x] Yes [] No      Osvaldo Brown Rep   08/27/24 14:14 CDT

## 2024-08-28 ENCOUNTER — TELEPHONE (OUTPATIENT)
Dept: FAMILY MEDICINE CLINIC | Facility: CLINIC | Age: 34
End: 2024-08-28
Payer: COMMERCIAL

## 2024-08-28 NOTE — TELEPHONE ENCOUNTER
Caller: Raquel Best    Relationship: Self    Best call back number: [phone]       What is the best time to reach you: ANYTIME    Who are you requesting to speak with (clinical staff, provider,  specific staff member): PROVIDER    What was the call regarding: PATIENT STATED SHE WOULD LIKE TO BE TESTED FOR Postural orthostatic tachycardia syndrome (POTS). SHE STATED FORGOT TO MENTION THIS AT HER VISIT YESTERDAY WITH [facility].    PLEASE CALL TO ADVISE.

## 2024-08-29 ENCOUNTER — TELEPHONE (OUTPATIENT)
Dept: FAMILY MEDICINE CLINIC | Facility: CLINIC | Age: 34
End: 2024-08-29
Payer: COMMERCIAL

## 2024-08-29 NOTE — TELEPHONE ENCOUNTER
Sent pt [employer] message relaying below    HUB TO RELAY  Your urine showed a moderate amount of blood but there was no bacteria seen, so it did not indicate a culture. Sharee is not concerned that you have a UTI at this time.  The rest of your labs were normal. Please let me know if you have any questions.

## 2024-08-29 NOTE — TELEPHONE ENCOUNTER
----- Message from Verónica Flores sent at 8/29/2024  7:54 AM CDT -----  Urinalysis- moderate blood, no bacteria seen, no culture indicated.   Lipid panel- stable  CBC- wnl  Hgba1c- stable at 5.3  Hep c screening- nonreactive.   CMP- wnl  TSH- wnl  Vitamin D- wnl  Vitamin B12- wnl

## 2024-08-29 NOTE — PROGRESS NOTES
Urinalysis- moderate blood, no bacteria seen, no culture indicated.   Lipid panel- stable  CBC- wnl  Hgba1c- stable at 5.3  Hep c screening- nonreactive.   CMP- wnl  TSH- wnl  Vitamin D- wnl  Vitamin B12- wnl

## 2024-08-29 NOTE — TELEPHONE ENCOUNTER
She would need another visit to specifically discuss symptoms, etc. She was here for a wellness and this wasn't discussed at all.

## 2025-02-01 ENCOUNTER — APPOINTMENT (OUTPATIENT)
Dept: CT IMAGING | Age: 35
End: 2025-02-01
Payer: COMMERCIAL

## 2025-02-01 ENCOUNTER — HOSPITAL ENCOUNTER (EMERGENCY)
Age: 35
Discharge: HOME | End: 2025-02-01
Payer: COMMERCIAL

## 2025-02-01 VITALS
SYSTOLIC BLOOD PRESSURE: 114 MMHG | OXYGEN SATURATION: 100 % | DIASTOLIC BLOOD PRESSURE: 85 MMHG | TEMPERATURE: 98.7 F | HEART RATE: 94 BPM | BODY MASS INDEX: 22.2 KG/M2 | HEIGHT: 64 IN | WEIGHT: 130 LBS | RESPIRATION RATE: 16 BRPM

## 2025-02-01 DIAGNOSIS — M54.50 ACUTE EXACERBATION OF CHRONIC LOW BACK PAIN: Primary | ICD-10-CM

## 2025-02-01 DIAGNOSIS — G89.29 ACUTE EXACERBATION OF CHRONIC LOW BACK PAIN: Primary | ICD-10-CM

## 2025-02-01 PROCEDURE — 72131 CT LUMBAR SPINE W/O DYE: CPT

## 2025-02-01 PROCEDURE — 72128 CT CHEST SPINE W/O DYE: CPT

## 2025-02-01 PROCEDURE — 6370000000 HC RX 637 (ALT 250 FOR IP): Performed by: PHYSICIAN ASSISTANT

## 2025-02-01 PROCEDURE — 6360000002 HC RX W HCPCS: Performed by: PHYSICIAN ASSISTANT

## 2025-02-01 PROCEDURE — 99284 EMERGENCY DEPT VISIT MOD MDM: CPT

## 2025-02-01 PROCEDURE — 96372 THER/PROPH/DIAG INJ SC/IM: CPT

## 2025-02-01 RX ORDER — MORPHINE SULFATE 4 MG/ML
4 INJECTION, SOLUTION INTRAMUSCULAR; INTRAVENOUS ONCE
Status: COMPLETED | OUTPATIENT
Start: 2025-02-01 | End: 2025-02-01

## 2025-02-01 RX ORDER — ORPHENADRINE CITRATE 30 MG/ML
60 INJECTION INTRAMUSCULAR; INTRAVENOUS ONCE
Status: COMPLETED | OUTPATIENT
Start: 2025-02-01 | End: 2025-02-01

## 2025-02-01 RX ORDER — PREDNISONE 10 MG/1
10 TABLET ORAL DAILY
Qty: 10 TABLET | Refills: 0 | Status: SHIPPED | OUTPATIENT
Start: 2025-02-01 | End: 2025-02-11

## 2025-02-01 RX ORDER — ORPHENADRINE CITRATE 100 MG/1
100 TABLET ORAL 2 TIMES DAILY
Qty: 20 TABLET | Refills: 0 | Status: SHIPPED | OUTPATIENT
Start: 2025-02-01 | End: 2025-02-11

## 2025-02-01 RX ORDER — DEXTROAMPHETAMINE SACCHARATE, AMPHETAMINE ASPARTATE, DEXTROAMPHETAMINE SULFATE AND AMPHETAMINE SULFATE 2.5; 2.5; 2.5; 2.5 MG/1; MG/1; MG/1; MG/1
10 TABLET ORAL DAILY
COMMUNITY

## 2025-02-01 RX ORDER — ONDANSETRON 4 MG/1
4 TABLET, ORALLY DISINTEGRATING ORAL ONCE
Status: COMPLETED | OUTPATIENT
Start: 2025-02-01 | End: 2025-02-01

## 2025-02-01 RX ADMIN — ONDANSETRON 4 MG: 4 TABLET, ORALLY DISINTEGRATING ORAL at 20:44

## 2025-02-01 RX ADMIN — ORPHENADRINE CITRATE 60 MG: 60 INJECTION INTRAMUSCULAR; INTRAVENOUS at 20:45

## 2025-02-01 RX ADMIN — MORPHINE SULFATE 4 MG: 4 INJECTION, SOLUTION INTRAMUSCULAR; INTRAVENOUS at 20:45

## 2025-02-01 ASSESSMENT — ENCOUNTER SYMPTOMS
EYE PAIN: 0
COLOR CHANGE: 0
ABDOMINAL PAIN: 0
NAUSEA: 0
SORE THROAT: 0
ABDOMINAL DISTENTION: 0
PHOTOPHOBIA: 0
BACK PAIN: 1
RHINORRHEA: 0
COUGH: 0
EYE DISCHARGE: 0
SHORTNESS OF BREATH: 0
APNEA: 0

## 2025-02-01 ASSESSMENT — PAIN SCALES - GENERAL: PAINLEVEL_OUTOF10: 7

## 2025-02-02 NOTE — DISCHARGE INSTRUCTIONS
Some disc disease noted primarily at L5-S1  Plan for meds and follow with PCP MRI outpatient as needed  No heavy lifting minimum 3 days with ideal bed rest and medications

## 2025-02-02 NOTE — ED PROVIDER NOTES
[facility] EMERGENCY DEPARTMENT  eMERGENCYdEPARTMENT eNCOUnter      Pt Name: Laura De Paz  MRN: [id number]  Birthdate 1990  Date of evaluation: 2/1/2025  Provider:SRI Patterson    CHIEF COMPLAINT       Chief Complaint   Patient presents with    Back Pain     Mid-low back pain onset Friday; hx of back issues and spinal tumor per pt, possible injury         HISTORY OF PRESENT ILLNESS  (Location/Symptom, Timing/Onset, Context/Setting, Quality, Duration, Modifying Factors, Severity.)   Laura De Paz is a 34 y.o. female who presents to the emergency department with complaints of lower back pain and mid back pain after heavy lifting Thursday. She has hx of disc hernation and \"tumor\" in her back she thinks on her t10. She has pain going down her left leg. Denies fall. She has no weakness denies urinary or bowel incontinence.     HPI    Nursing Notes were reviewed and I agree.    REVIEW OF SYSTEMS    (2-9 systems for level 4, 10 or more for level 5)     Review of Systems   Constitutional:  Negative for activity change, appetite change, chills and fever.   HENT:  Negative for congestion, postnasal drip, rhinorrhea and sore throat.    Eyes:  Negative for photophobia, pain, discharge and visual disturbance.   Respiratory:  Negative for apnea, cough and shortness of breath.    Cardiovascular:  Negative for chest pain and leg swelling.   Gastrointestinal:  Negative for abdominal distention, abdominal pain and nausea.   Genitourinary:  Negative for vaginal bleeding.   Musculoskeletal:  Positive for back pain. Negative for arthralgias, joint swelling, neck pain and neck stiffness.   Skin:  Negative for color change and rash.   Neurological:  Negative for dizziness, syncope, facial asymmetry and headaches.   Hematological:  Negative for adenopathy. Does not bruise/bleed easily.   Psychiatric/Behavioral:  Negative for agitation, behavioral problems and confusion.         Except as noted above the remainder of the review of

## 2025-02-03 ENCOUNTER — TELEPHONE (OUTPATIENT)
Dept: NEUROSURGERY | Age: 35
End: 2025-02-03

## 2025-02-03 NOTE — TELEPHONE ENCOUNTER
[facility] New Patient Questionnaire    Diagnosis/Reason for Referral?     ED REFERRAL-BACK PAIN    2. Who is completing questionnaire?      Patient X Caregiver Family      3. Has the patient had any previous spinal/brain surgeries?     NO      A. If yes, what is the name of the facility in which the surgery was performed?       B. Procedure/Surgery performed?       C. Who was the surgeon?       D. When was the surgery?    MM/YY       E. Did the patient improve after the surgery?        4. Is this a second opinion?   If yes, Dr. Jameson would like to review patient first before making the appointment.      5. Have MRI Images been obtain within the last year?     Yes  No      XR  CT X     If yes, where was the imaging performed?     [facility]   If yes, what part of the body?     Lumbar X Cervical  Thoracic X Brain     If yes, when was it obtained?      2/1/2025    Note: if the scan was performed at a facility other than [facility], the disc will need to be brought to the appointment or we need to reach out to obtain the disc.     A. Was the patient instructed to provide the disc?      Yes   No X      8. Has the patient had a NCV/EMG within the last year?      Yes  No X     If yes, where was it performed and date?      MM/YY  Location:      9. Has the patient been to Physical Therapy?      Yes  No X     If yes, what location, how long attended, and last visit?    Location:        Therapy Lasted:    Date of Last Visit:      10. Has the patient been to Pain Management?     Yes  No X     If yes, what location and last visit     Location:   Last Visit:   Is it helping?

## 2025-02-10 ENCOUNTER — TELEPHONE (OUTPATIENT)
Dept: NEUROSURGERY | Age: 35
End: 2025-02-10

## 2025-02-10 NOTE — TELEPHONE ENCOUNTER
Patient was calling the office back she said she had a missed called from the office.      Thank you    [Fully active, able to carry on all pre-disease performance without restriction] : Status 0 - Fully active, able to carry on all pre-disease performance without restriction [Normal] : affect appropriate [de-identified] : fine crackles at bilateral bases no wheezes, good air entry  [de-identified] : tachycardia but regular [de-identified] : left 9:00-10:00 , s/p healed ulceration 3x4cm induration (unchanged) and vertical indentation unchanged; satellite lesion at 11:00 1.5 (slight increase?) cm;  no adenopathy b/l ; no mass right breast,  [de-identified] : Left hand swollen diffusely and extending up forearm; edema noted;  no left axillary nodes palpated; change

## 2025-02-11 ENCOUNTER — OFFICE VISIT (OUTPATIENT)
Dept: NEUROSURGERY | Age: 35
End: 2025-02-11
Payer: COMMERCIAL

## 2025-02-11 VITALS
DIASTOLIC BLOOD PRESSURE: 80 MMHG | SYSTOLIC BLOOD PRESSURE: 118 MMHG | HEART RATE: 77 BPM | BODY MASS INDEX: 22.21 KG/M2 | WEIGHT: 130.07 LBS | HEIGHT: 64 IN

## 2025-02-11 DIAGNOSIS — Z86.03: ICD-10-CM

## 2025-02-11 DIAGNOSIS — M54.42 CHRONIC BILATERAL LOW BACK PAIN WITH LEFT-SIDED SCIATICA: Primary | ICD-10-CM

## 2025-02-11 DIAGNOSIS — G89.29 CHRONIC BILATERAL LOW BACK PAIN WITH LEFT-SIDED SCIATICA: Primary | ICD-10-CM

## 2025-02-11 PROCEDURE — 99204 OFFICE O/P NEW MOD 45 MIN: CPT | Performed by: NEUROLOGICAL SURGERY

## 2025-02-11 RX ORDER — CLONAZEPAM 1 MG/1
1 TABLET ORAL PRN
COMMUNITY

## 2025-02-11 RX ORDER — LURASIDONE HYDROCHLORIDE 20 MG/1
TABLET, FILM COATED ORAL
COMMUNITY
Start: 2025-02-06

## 2025-02-11 ASSESSMENT — ENCOUNTER SYMPTOMS
GASTROINTESTINAL NEGATIVE: 1
BACK PAIN: 1
EYES NEGATIVE: 1
RESPIRATORY NEGATIVE: 1
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
## 2025-03-18 ENCOUNTER — OFFICE VISIT (OUTPATIENT)
Dept: FAMILY MEDICINE CLINIC | Facility: CLINIC | Age: 35
End: 2025-03-18
Payer: COMMERCIAL

## 2025-03-18 ENCOUNTER — HOSPITAL ENCOUNTER (EMERGENCY)
Age: 35
Discharge: HOME | End: 2025-03-18
Payer: COMMERCIAL

## 2025-03-18 VITALS
OXYGEN SATURATION: 100 % | HEART RATE: 97 BPM | DIASTOLIC BLOOD PRESSURE: 78 MMHG | HEIGHT: 64 IN | RESPIRATION RATE: 20 BRPM | SYSTOLIC BLOOD PRESSURE: 115 MMHG | TEMPERATURE: 97.7 F | BODY MASS INDEX: 21.68 KG/M2 | WEIGHT: 127 LBS

## 2025-03-18 VITALS
SYSTOLIC BLOOD PRESSURE: 130 MMHG | DIASTOLIC BLOOD PRESSURE: 95 MMHG | TEMPERATURE: 98 F | WEIGHT: 127 LBS | OXYGEN SATURATION: 100 % | BODY MASS INDEX: 21.79 KG/M2 | RESPIRATION RATE: 16 BRPM | HEART RATE: 102 BPM

## 2025-03-18 DIAGNOSIS — G43.009 MIGRAINE WITHOUT AURA AND WITHOUT STATUS MIGRAINOSUS, NOT INTRACTABLE: Primary | ICD-10-CM

## 2025-03-18 DIAGNOSIS — R07.0 PAIN IN THROAT: ICD-10-CM

## 2025-03-18 DIAGNOSIS — R50.9 FEVER, UNSPECIFIED FEVER CAUSE: ICD-10-CM

## 2025-03-18 DIAGNOSIS — H65.93 FLUID LEVEL BEHIND TYMPANIC MEMBRANE OF BOTH EARS: ICD-10-CM

## 2025-03-18 DIAGNOSIS — J01.00 ACUTE NON-RECURRENT MAXILLARY SINUSITIS: Primary | ICD-10-CM

## 2025-03-18 DIAGNOSIS — J30.2 SEASONAL ALLERGIES: ICD-10-CM

## 2025-03-18 PROCEDURE — 99284 EMERGENCY DEPT VISIT MOD MDM: CPT

## 2025-03-18 PROCEDURE — 96374 THER/PROPH/DIAG INJ IV PUSH: CPT

## 2025-03-18 PROCEDURE — 99213 OFFICE O/P EST LOW 20 MIN: CPT | Performed by: NURSE PRACTITIONER

## 2025-03-18 PROCEDURE — 6360000002 HC RX W HCPCS: Performed by: NURSE PRACTITIONER

## 2025-03-18 PROCEDURE — 1160F RVW MEDS BY RX/DR IN RCRD: CPT | Performed by: NURSE PRACTITIONER

## 2025-03-18 PROCEDURE — 1159F MED LIST DOCD IN RCRD: CPT | Performed by: NURSE PRACTITIONER

## 2025-03-18 PROCEDURE — 96375 TX/PRO/DX INJ NEW DRUG ADDON: CPT

## 2025-03-18 RX ORDER — LURASIDONE HYDROCHLORIDE 20 MG/1
TABLET, FILM COATED ORAL
COMMUNITY

## 2025-03-18 RX ORDER — METOCLOPRAMIDE HYDROCHLORIDE 5 MG/ML
10 INJECTION INTRAMUSCULAR; INTRAVENOUS ONCE
Status: COMPLETED | OUTPATIENT
Start: 2025-03-18 | End: 2025-03-18

## 2025-03-18 RX ORDER — ORPHENADRINE CITRATE 100 MG/1
1 TABLET ORAL EVERY 12 HOURS SCHEDULED
COMMUNITY
Start: 2025-02-02

## 2025-03-18 RX ORDER — LEVOCETIRIZINE DIHYDROCHLORIDE 5 MG/1
5 TABLET, FILM COATED ORAL NIGHTLY
Qty: 30 TABLET | Refills: 0 | Status: SHIPPED | OUTPATIENT
Start: 2025-03-18

## 2025-03-18 RX ORDER — CEFUROXIME AXETIL 500 MG/1
500 TABLET ORAL 2 TIMES DAILY
Qty: 20 TABLET | Refills: 0 | Status: SHIPPED | OUTPATIENT
Start: 2025-03-18

## 2025-03-18 RX ORDER — FLUTICASONE PROPIONATE 50 MCG
1 SPRAY, SUSPENSION (ML) NASAL DAILY
Qty: 16 G | Refills: 0 | Status: SHIPPED | OUTPATIENT
Start: 2025-03-18

## 2025-03-18 RX ORDER — DIPHENHYDRAMINE HYDROCHLORIDE 50 MG/ML
50 INJECTION, SOLUTION INTRAMUSCULAR; INTRAVENOUS ONCE
Status: COMPLETED | OUTPATIENT
Start: 2025-03-18 | End: 2025-03-18

## 2025-03-18 RX ORDER — KETOROLAC TROMETHAMINE 30 MG/ML
30 INJECTION, SOLUTION INTRAMUSCULAR; INTRAVENOUS ONCE
Status: COMPLETED | OUTPATIENT
Start: 2025-03-18 | End: 2025-03-18

## 2025-03-18 RX ORDER — DEXAMETHASONE SODIUM PHOSPHATE 4 MG/ML
4 INJECTION, SOLUTION INTRA-ARTICULAR; INTRALESIONAL; INTRAMUSCULAR; INTRAVENOUS; SOFT TISSUE ONCE
Status: COMPLETED | OUTPATIENT
Start: 2025-03-18 | End: 2025-03-18

## 2025-03-18 RX ADMIN — DIPHENHYDRAMINE HYDROCHLORIDE 50 MG: 50 INJECTION INTRAMUSCULAR; INTRAVENOUS at 17:51

## 2025-03-18 RX ADMIN — DEXAMETHASONE SODIUM PHOSPHATE 4 MG: 4 INJECTION INTRA-ARTICULAR; INTRALESIONAL; INTRAMUSCULAR; INTRAVENOUS; SOFT TISSUE at 17:51

## 2025-03-18 RX ADMIN — KETOROLAC TROMETHAMINE 30 MG: 30 INJECTION, SOLUTION INTRAMUSCULAR at 17:50

## 2025-03-18 RX ADMIN — METOCLOPRAMIDE HYDROCHLORIDE 10 MG: 5 INJECTION INTRAMUSCULAR; INTRAVENOUS at 17:51

## 2025-03-18 ASSESSMENT — PAIN DESCRIPTION - LOCATION
LOCATION: HEAD
LOCATION: HEAD

## 2025-03-18 ASSESSMENT — PAIN SCALES - GENERAL
PAINLEVEL_OUTOF10: 7
PAINLEVEL_OUTOF10: 4

## 2025-03-18 ASSESSMENT — ENCOUNTER SYMPTOMS
SORE THROAT: 0
RHINORRHEA: 1
RESPIRATORY NEGATIVE: 1

## 2025-03-18 ASSESSMENT — PAIN DESCRIPTION - DESCRIPTORS
DESCRIPTORS: ACHING
DESCRIPTORS: THROBBING;PRESSURE

## 2025-03-18 NOTE — PROGRESS NOTES
"Chief Complaint  Fever, Generalized Body Aches, Nasal Congestion, Sore Throat, and Earache    Subjective    History of Present Illness      Patient presents to [facility] for   History of Present Illness  Pt is here today c/o fever, sore throat, body aches, earache and nasal congestion since Friday .  Pt had ibuprofen at 4-5 this AM.       History of Present Illness      Review of Systems   Constitutional: Negative.  Positive for fever.   HENT: Negative.  Positive for congestion and sore throat.    Eyes: Negative.    Respiratory: Negative.     Cardiovascular: Negative.    Gastrointestinal: Negative.    Endocrine: Negative.    Genitourinary: Negative.    Musculoskeletal: Negative.    Skin: Negative.    Allergic/Immunologic: Negative.    Neurological: Negative.    Hematological: Negative.    Psychiatric/Behavioral: Negative.         I have reviewed and agree with the HPI and ROS information as above.  Verónica Flores, APRN     Objective   Vital Signs:   /78 (BP Location: Left arm, Patient Position: Sitting, Cuff Size: Adult)   Pulse 97   Temp 97.7 °F (36.5 °C) (Temporal)   Resp 20   Ht 162.6 cm (64\")   Wt 57.6 kg (127 lb)   SpO2 100%   BMI 21.80 kg/m²     BMI is within normal parameters. No other follow-up for BMI required.      Physical Exam  Constitutional:       Appearance: Normal appearance. She is well-developed.   HENT:      Head: Normocephalic and atraumatic.      Right Ear: External ear normal. A middle ear effusion is present.      Left Ear: External ear normal. A middle ear effusion is present.      Nose: Nose normal. No nasal tenderness or congestion.      Mouth/Throat:      Lips: Pink. No lesions.      Mouth: Mucous membranes are moist. No oral lesions.      Dentition: Normal dentition.      Pharynx: Oropharynx is clear. No pharyngeal swelling, oropharyngeal exudate or posterior oropharyngeal erythema.   Eyes:      General: Lids are normal. Vision grossly " intact. No scleral icterus.        Right eye: No discharge.         Left eye: No discharge.      Extraocular Movements: Extraocular movements intact.      Conjunctiva/sclera: Conjunctivae normal.      Right eye: Right conjunctiva is not injected.      Left eye: Left conjunctiva is not injected.      Pupils: Pupils are equal, round, and reactive to light.   Cardiovascular:      Rate and Rhythm: Normal rate and regular rhythm.      Heart sounds: Normal heart sounds. No murmur heard.     No gallop.   Pulmonary:      Effort: Pulmonary effort is normal.      Breath sounds: Normal breath sounds and air entry. No wheezing, rhonchi or rales.   Musculoskeletal:         General: No tenderness or deformity. Normal range of motion.      Cervical back: Full passive range of motion without pain, normal range of motion and neck supple.      Right lower leg: No edema.      Left lower leg: No edema.   Skin:     General: Skin is warm and dry.      Coloration: Skin is not jaundiced.      Findings: No rash.   Neurological:      Mental Status: She is alert and oriented to person, place, and time.      Sensory: Sensation is intact.      Motor: Motor function is intact.      Coordination: Coordination is intact.      Gait: Gait is intact.   Psychiatric:         Attention and Perception: Attention normal.         Mood and Affect: Mood and affect normal.         Behavior: Behavior is not hyperactive. Behavior is cooperative.         Thought Content: Thought content normal.         Judgment: Judgment normal.          CLARENCE-7: Over the last two weeks, how often have you been bothered by the following problems?  Feeling nervous, anxious or on edge: Not at all  Not being able to stop or control worrying: Not at all  Worrying too much about different things: Not at all  Trouble Relaxing: Not at all  Being so restless that it is hard to sit still: Not at all  Becoming easily annoyed or irritable: Not at all  Feeling afraid as if something awful  might happen: Not at all  CLARENCE 7 Total Score: 0  If you checked any problems, how difficult have these problems made it for you to do your work, take care of things at home, or get along with other people: Not difficult at all    PHQ-2 Depression Screening    Little interest or pleasure in doing things? Not at all   Feeling down, depressed, or hopeless? Not at all   PHQ-2 Total Score 0      PHQ-9 Depression Screening  Little interest or pleasure in doing things? Not at all   Feeling down, depressed, or hopeless? Not at all   PHQ-2 Total Score 0   Trouble falling or staying asleep, or sleeping too much?     Feeling tired or having little energy?     Poor appetite or overeating?     Feeling bad about yourself - or that you are a failure or have let yourself or your family down?     Trouble concentrating on things, such as reading the newspaper or watching television?     Moving or speaking so slowly that other people could have noticed? Or the opposite - being so fidgety or restless that you have been moving around a lot more than usual?     Thoughts that you would be better off dead, or of hurting yourself in some way?     PHQ-9 Total Score     If you checked off any problems, how difficult have these problems made it for you to do your work, take care of things at home, or get along with other people? Not difficult at all           Result Review  Data Reviewed:              Assessment and Plan      Diagnoses and all orders for this visit:    1. Acute non-recurrent maxillary sinusitis (Primary)  -     cefuroxime (CEFTIN) 500 MG tablet; Take 1 tablet by mouth 2 (Two) Times a Day.  Dispense: 20 tablet; Refill: 0    2. Fever, unspecified fever cause    3. Pain in throat    4. Fluid level behind tympanic membrane of both ears        Assessment & Plan      Patient here today with complaints of sore throat, fever, and headache.  She also reports having mild sinus congestion and a nonproductive cough.  She states her ears  are hurting as well, her right ear worse than the left.  Symptoms began on Friday.  She does state that her son was recently diagnosed with strep throat.  On exam there is fluid noted behind bilateral TMs, no infection noted.  Otherwise, normal physical exam.    Plan:    1.  Offered respiratory panel swab, COVID, flu, or strep swab.  Patient declines would like treatment only.  2.  Start Ceftin 500 mg twice daily x 10 days.  3.  Follow-up if symptoms do not improve or he can worse.        Follow Up   Return if symptoms worsen or fail to improve.  Patient was given instructions and counseling regarding her condition or for health maintenance advice. Please see specific information pulled into the AVS if appropriate.

## 2025-03-18 NOTE — ED PROVIDER NOTES
[facility] EMERGENCY DEPARTMENT  EMERGENCY DEPARTMENT ENCOUNTER      Pt Name: Laura De Paz  MRN: [id number]  Birthdate 1990  Date of evaluation: 3/18/2025  Provider: JOSSELINE Saxena NP    CHIEF COMPLAINT       Chief Complaint   Patient presents with    Migraine     X3 days, out of migraine meds and told to come here by PCP         HISTORY OF PRESENT ILLNESS   (Location/Symptom, Timing/Onset,Context/Setting, Quality, Duration, Modifying Factors, Severity)  Note limiting factors.   Laura De Paz is a 34 y.o. female who presents to the emergency department with report of migraine for the last 3 days.  Patient has a history of migraine headaches and states that symptoms are similar to prior migraines.  She endorses photophobia and nausea but denies vomiting.  Patient reports that she is out of her migraine medications and was told by her PCP to go to the ER for treatment.  Medical history includes ADHD, chronic back pain, GERD, factor V Leiden mutation, and hypertension.        The history is provided by the patient.       NursingNotes were reviewed.    REVIEW OF SYSTEMS    (2-9 systems for level 4, 10 or more for level 5)     Review of Systems   Constitutional:  Negative for chills and fever.        As per HPI   HENT:  Positive for congestion, postnasal drip and rhinorrhea. Negative for sore throat.    Respiratory: Negative.     Cardiovascular: Negative.    Genitourinary: Negative.    Skin: Negative.    Neurological:  Positive for headaches. Negative for dizziness, seizures, speech difficulty and light-headedness.   All other systems reviewed and are negative.      A complete review of systems was performed and is negative except as noted above in the HPI.       PAST MEDICAL HISTORY     Past Medical History:   Diagnosis Date    ADD (attention deficit disorder)     ADHD (attention deficit hyperactivity disorder)     Chronic back pain     Factor 5 Leiden mutation, heterozygous     GERD (gastroesophageal

## 2025-03-18 NOTE — DISCHARGE INSTRUCTIONS
Medications as prescribed.  Fluticasone spray every morning and Xyzal every night at bedtime.  Be sure you are drinking plenty of water and remember that you are losing fluids when you have a runny nose.  Rest in a cool, quiet, dark room.  Follow-up with your PCP for migraine medication refills.  Return to ER for any new, worsening, or change in condition.

## 2025-04-29 ENCOUNTER — HOSPITAL ENCOUNTER (OUTPATIENT)
Dept: MRI IMAGING | Age: 35
Discharge: HOME | End: 2025-04-29
Attending: NEUROLOGICAL SURGERY
Payer: COMMERCIAL

## 2025-04-29 DIAGNOSIS — G89.29 CHRONIC BILATERAL LOW BACK PAIN WITH LEFT-SIDED SCIATICA: ICD-10-CM

## 2025-04-29 DIAGNOSIS — Z86.03: ICD-10-CM

## 2025-04-29 DIAGNOSIS — M54.42 CHRONIC BILATERAL LOW BACK PAIN WITH LEFT-SIDED SCIATICA: ICD-10-CM

## 2025-04-29 PROCEDURE — A9577 INJ MULTIHANCE: HCPCS | Performed by: NEUROLOGICAL SURGERY

## 2025-04-29 PROCEDURE — 72157 MRI CHEST SPINE W/O & W/DYE: CPT

## 2025-04-29 PROCEDURE — 72158 MRI LUMBAR SPINE W/O & W/DYE: CPT

## 2025-04-29 PROCEDURE — 6360000004 HC RX CONTRAST MEDICATION: Performed by: NEUROLOGICAL SURGERY

## 2025-04-29 RX ADMIN — GADOBENATE DIMEGLUMINE 11 ML: 529 INJECTION, SOLUTION INTRAVENOUS at 17:49

## 2025-08-02 ENCOUNTER — HOSPITAL ENCOUNTER (EMERGENCY)
Age: 35
Discharge: HOME | End: 2025-08-02
Payer: COMMERCIAL

## 2025-08-02 ENCOUNTER — APPOINTMENT (OUTPATIENT)
Dept: GENERAL RADIOLOGY | Age: 35
End: 2025-08-02
Payer: COMMERCIAL

## 2025-08-02 VITALS
OXYGEN SATURATION: 100 % | DIASTOLIC BLOOD PRESSURE: 98 MMHG | RESPIRATION RATE: 16 BRPM | BODY MASS INDEX: 20.59 KG/M2 | WEIGHT: 120 LBS | SYSTOLIC BLOOD PRESSURE: 138 MMHG | TEMPERATURE: 98.2 F | HEART RATE: 99 BPM

## 2025-08-02 DIAGNOSIS — M20.12 HALLUX VALGUS (ACQUIRED), LEFT FOOT: Primary | ICD-10-CM

## 2025-08-02 DIAGNOSIS — G89.29 CHRONIC FOOT PAIN, LEFT: ICD-10-CM

## 2025-08-02 DIAGNOSIS — M79.672 CHRONIC FOOT PAIN, LEFT: ICD-10-CM

## 2025-08-02 PROCEDURE — 96372 THER/PROPH/DIAG INJ SC/IM: CPT

## 2025-08-02 PROCEDURE — 6360000002 HC RX W HCPCS

## 2025-08-02 PROCEDURE — 73630 X-RAY EXAM OF FOOT: CPT

## 2025-08-02 PROCEDURE — 99284 EMERGENCY DEPT VISIT MOD MDM: CPT

## 2025-08-02 RX ORDER — KETOROLAC TROMETHAMINE 30 MG/ML
30 INJECTION, SOLUTION INTRAMUSCULAR; INTRAVENOUS ONCE
Status: COMPLETED | OUTPATIENT
Start: 2025-08-02 | End: 2025-08-02

## 2025-08-02 RX ADMIN — KETOROLAC TROMETHAMINE 30 MG: 30 INJECTION, SOLUTION INTRAMUSCULAR at 15:41

## 2025-08-02 ASSESSMENT — PAIN DESCRIPTION - LOCATION: LOCATION: FOOT

## 2025-08-02 ASSESSMENT — PAIN DESCRIPTION - ORIENTATION: ORIENTATION: LEFT

## 2025-08-02 ASSESSMENT — PAIN DESCRIPTION - DESCRIPTORS: DESCRIPTORS: SHARP

## 2025-08-02 ASSESSMENT — PAIN SCALES - GENERAL: PAINLEVEL_OUTOF10: 7

## 2025-08-02 NOTE — DISCHARGE INSTRUCTIONS
Please rest, ice, elevate the affected extremity, and wear your boot and use your crutches whenever you are active.  You should take ibuprofen, and Tylenol for your pain.  You may stagger these medications to optimize pain control.  For example, if you take your ibuprofen at 6 AM, you may take Tylenol at 9 AM, that way one of the medications is always reaching peak efficacy as the other starts to dwindle.  Please do not exceed 3000 mg of Tylenol in a day from all sources.  Ice should not be applied directly to the skin, you should use a dish towel, T-shirt, or cloth to prevent cold injury to your skin.  Ice should be applied for 15 minutes on, and then 15 minutes off, alternating.  Please follow-up as directed, and return to the ED if you have any new or worsening symptoms including worsening pain, swelling, redness, warmth, weakness, numbness, tingling, or if you have any new symptoms or concerns

## 2025-08-04 NOTE — ED PROVIDER NOTES
Musculoskeletal:         General: Tenderness present. No swelling, deformity or signs of injury.      Comments: Hallux valgus, tenderness about the great toe MTP without significant redness, swelling, warmth.  Hyperesthetic throughout the foot, without bruising, deformity, or signs of trauma   Skin:     General: Skin is warm and dry.      Capillary Refill: Capillary refill takes less than 2 seconds.   Neurological:      General: No focal deficit present.      Mental Status: She is alert.   Psychiatric:         Mood and Affect: Mood normal.         Behavior: Behavior normal.         DIAGNOSTIC RESULTS     EKG: All EKG's are interpreted by the Emergency Department Physician who either signs or Co-signs this chart in the absence of a cardiologist.    RADIOLOGY:   Non-plain film images such as CT, Ultrasound and MRI are read by the radiologist. Plain radiographic images are visualized and preliminarily interpreted by the emergency physician with the below findings:    No acute abnormalities, hallux valgus noted    Interpretation per the Radiologist below, if available at the time of this note:    XR FOOT LEFT (MIN 3 VIEWS)   Final Result   No acute osseous abnormality of the left foot.               ______________________________________    Electronically signed by: NITIN DELEON M.D.   Date:     08/02/2025   Time:    16:10             ED BEDSIDE ULTRASOUND:   Performed by ED Physician - none    LABS:  Labs Reviewed - No data to display    All other labs were within normal range or not returned as of this dictation.    Medications   ketorolac (TORADOL) injection 30 mg (30 mg IntraMUSCular Given 8/2/25 1541)       EMERGENCY DEPARTMENT COURSE and DIFFERENTIALDIAGNOSIS/MDM:   Vitals:    Vitals:    08/02/25 1458   BP: (!) 138/98   Pulse: 99   Resp: 16   Temp: 98.2 °F (36.8 °C)   TempSrc: Oral   SpO2: 100%   Weight: 54.4 kg (120 lb)       MDM      Patient presented for evaluation of foot pain.  Differential diagnosis

## 2025-08-12 ENCOUNTER — TELEPHONE (OUTPATIENT)
Age: 35
End: 2025-08-12

## 2025-08-14 ENCOUNTER — OFFICE VISIT (OUTPATIENT)
Age: 35
End: 2025-08-14
Payer: COMMERCIAL

## 2025-08-14 VITALS — HEIGHT: 64 IN | BODY MASS INDEX: 20.49 KG/M2 | WEIGHT: 120 LBS

## 2025-08-14 DIAGNOSIS — S90.32XA CONTUSION OF LEFT FOOT, INITIAL ENCOUNTER: ICD-10-CM

## 2025-08-14 DIAGNOSIS — M20.12 HALLUX VALGUS, LEFT: Primary | ICD-10-CM

## 2025-08-14 DIAGNOSIS — S80.862A TICK BITE OF LEFT LOWER LEG, INITIAL ENCOUNTER: ICD-10-CM
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
## 2025-08-14 PROCEDURE — 99204 OFFICE O/P NEW MOD 45 MIN: CPT | Performed by: NURSE PRACTITIONER

## 2025-08-14 RX ORDER — DOXYCYCLINE HYCLATE 100 MG
100 TABLET ORAL 2 TIMES DAILY
Qty: 20 TABLET | Refills: 0 | Status: SHIPPED | OUTPATIENT
Start: 2025-08-14 | End: 2025-08-24

## 2025-08-14 ASSESSMENT — ENCOUNTER SYMPTOMS
BLOOD IN STOOL: 0
COLOR CHANGE: 0
CONSTIPATION: 0
SHORTNESS OF BREATH: 0
NAUSEA: 0
DIARRHEA: 0
COUGH: 0
VOMITING: 0